# Patient Record
Sex: FEMALE | Race: WHITE | NOT HISPANIC OR LATINO | Employment: PART TIME | URBAN - METROPOLITAN AREA
[De-identification: names, ages, dates, MRNs, and addresses within clinical notes are randomized per-mention and may not be internally consistent; named-entity substitution may affect disease eponyms.]

---

## 2018-06-13 ENCOUNTER — APPOINTMENT (OUTPATIENT)
Dept: RADIOLOGY | Facility: MEDICAL CENTER | Age: 53
DRG: 987 | End: 2018-06-13
Payer: COMMERCIAL

## 2018-06-13 ENCOUNTER — HOSPITAL ENCOUNTER (INPATIENT)
Facility: MEDICAL CENTER | Age: 53
LOS: 8 days | DRG: 987 | End: 2018-06-21
Attending: EMERGENCY MEDICINE | Admitting: HOSPITALIST
Payer: COMMERCIAL

## 2018-06-13 ENCOUNTER — APPOINTMENT (OUTPATIENT)
Dept: RADIOLOGY | Facility: MEDICAL CENTER | Age: 53
DRG: 987 | End: 2018-06-13
Attending: EMERGENCY MEDICINE
Payer: COMMERCIAL

## 2018-06-13 DIAGNOSIS — R56.9 NEW ONSET SEIZURE (HCC): ICD-10-CM

## 2018-06-13 DIAGNOSIS — R40.4 ALTERED LEVEL OF CONSCIOUSNESS: ICD-10-CM

## 2018-06-13 DIAGNOSIS — R13.13 PHARYNGEAL DYSPHAGIA: ICD-10-CM

## 2018-06-13 PROBLEM — R00.0 TACHYCARDIA: Status: ACTIVE | Noted: 2018-06-13

## 2018-06-13 PROBLEM — G93.40 ENCEPHALOPATHY ACUTE: Status: ACTIVE | Noted: 2018-06-13

## 2018-06-13 LAB
ALBUMIN SERPL BCP-MCNC: 4.2 G/DL (ref 3.2–4.9)
ALBUMIN/GLOB SERPL: 1.1 G/DL
ALP SERPL-CCNC: 42 U/L (ref 30–99)
ALT SERPL-CCNC: 27 U/L (ref 2–50)
AMPHET UR QL SCN: NEGATIVE
ANION GAP SERPL CALC-SCNC: 10 MMOL/L (ref 0–11.9)
APPEARANCE UR: CLEAR
APTT PPP: 23.4 SEC (ref 24.7–36)
AST SERPL-CCNC: 22 U/L (ref 12–45)
BARBITURATES UR QL SCN: NEGATIVE
BASOPHILS # BLD AUTO: 0.6 % (ref 0–1.8)
BASOPHILS # BLD: 0.06 K/UL (ref 0–0.12)
BENZODIAZ UR QL SCN: NEGATIVE
BILIRUB SERPL-MCNC: 0.6 MG/DL (ref 0.1–1.5)
BILIRUB UR QL STRIP.AUTO: NEGATIVE
BUN SERPL-MCNC: 12 MG/DL (ref 8–22)
BZE UR QL SCN: NEGATIVE
CALCIUM SERPL-MCNC: 9.4 MG/DL (ref 8.5–10.5)
CANNABINOIDS UR QL SCN: NEGATIVE
CHLORIDE SERPL-SCNC: 106 MMOL/L (ref 96–112)
CO2 SERPL-SCNC: 21 MMOL/L (ref 20–33)
COLOR UR: YELLOW
CREAT SERPL-MCNC: 0.82 MG/DL (ref 0.5–1.4)
EOSINOPHIL # BLD AUTO: 0.11 K/UL (ref 0–0.51)
EOSINOPHIL NFR BLD: 1.1 % (ref 0–6.9)
ERYTHROCYTE [DISTWIDTH] IN BLOOD BY AUTOMATED COUNT: 50 FL (ref 35.9–50)
EST. AVERAGE GLUCOSE BLD GHB EST-MCNC: 100 MG/DL
ETHANOL BLD-MCNC: 0 G/DL
GLOBULIN SER CALC-MCNC: 3.9 G/DL (ref 1.9–3.5)
GLUCOSE SERPL-MCNC: 90 MG/DL (ref 65–99)
GLUCOSE UR STRIP.AUTO-MCNC: NEGATIVE MG/DL
HBA1C MFR BLD: 5.1 % (ref 0–5.6)
HCT VFR BLD AUTO: 38.2 % (ref 37–47)
HGB BLD-MCNC: 13.4 G/DL (ref 12–16)
IMM GRANULOCYTES # BLD AUTO: 0.04 K/UL (ref 0–0.11)
IMM GRANULOCYTES NFR BLD AUTO: 0.4 % (ref 0–0.9)
INR PPP: 1.05 (ref 0.87–1.13)
KETONES UR STRIP.AUTO-MCNC: 40 MG/DL
LEUKOCYTE ESTERASE UR QL STRIP.AUTO: NEGATIVE
LYMPHOCYTES # BLD AUTO: 2.78 K/UL (ref 1–4.8)
LYMPHOCYTES NFR BLD: 27.3 % (ref 22–41)
MAGNESIUM SERPL-MCNC: 1.8 MG/DL (ref 1.5–2.5)
MCH RBC QN AUTO: 32.3 PG (ref 27–33)
MCHC RBC AUTO-ENTMCNC: 35.1 G/DL (ref 33.6–35)
MCV RBC AUTO: 92 FL (ref 81.4–97.8)
METHADONE UR QL SCN: NEGATIVE
MICRO URNS: ABNORMAL
MONOCYTES # BLD AUTO: 0.58 K/UL (ref 0–0.85)
MONOCYTES NFR BLD AUTO: 5.7 % (ref 0–13.4)
NEUTROPHILS # BLD AUTO: 6.61 K/UL (ref 2–7.15)
NEUTROPHILS NFR BLD: 64.9 % (ref 44–72)
NITRITE UR QL STRIP.AUTO: NEGATIVE
NRBC # BLD AUTO: 0 K/UL
NRBC BLD-RTO: 0 /100 WBC
OPIATES UR QL SCN: NEGATIVE
OXYCODONE UR QL SCN: NEGATIVE
PCP UR QL SCN: NEGATIVE
PH UR STRIP.AUTO: 5 [PH]
PLATELET # BLD AUTO: 245 K/UL (ref 164–446)
PMV BLD AUTO: 8.4 FL (ref 9–12.9)
POTASSIUM SERPL-SCNC: 3.5 MMOL/L (ref 3.6–5.5)
PROPOXYPH UR QL SCN: NEGATIVE
PROT SERPL-MCNC: 8.1 G/DL (ref 6–8.2)
PROT UR QL STRIP: NEGATIVE MG/DL
PROTHROMBIN TIME: 13.4 SEC (ref 12–14.6)
RBC # BLD AUTO: 4.15 M/UL (ref 4.2–5.4)
RBC UR QL AUTO: NEGATIVE
SODIUM SERPL-SCNC: 137 MMOL/L (ref 135–145)
SP GR UR STRIP.AUTO: 1.02
TROPONIN I SERPL-MCNC: <0.01 NG/ML (ref 0–0.04)
TSH SERPL DL<=0.005 MIU/L-ACNC: 2.61 UIU/ML (ref 0.38–5.33)
UROBILINOGEN UR STRIP.AUTO-MCNC: 0.2 MG/DL
WBC # BLD AUTO: 10.2 K/UL (ref 4.8–10.8)

## 2018-06-13 PROCEDURE — 83735 ASSAY OF MAGNESIUM: CPT

## 2018-06-13 PROCEDURE — 80307 DRUG TEST PRSMV CHEM ANLYZR: CPT

## 2018-06-13 PROCEDURE — 96375 TX/PRO/DX INJ NEW DRUG ADDON: CPT

## 2018-06-13 PROCEDURE — 80053 COMPREHEN METABOLIC PANEL: CPT

## 2018-06-13 PROCEDURE — 700111 HCHG RX REV CODE 636 W/ 250 OVERRIDE (IP): Performed by: PSYCHIATRY & NEUROLOGY

## 2018-06-13 PROCEDURE — 700105 HCHG RX REV CODE 258: Performed by: PSYCHIATRY & NEUROLOGY

## 2018-06-13 PROCEDURE — 99291 CRITICAL CARE FIRST HOUR: CPT | Performed by: INTERNAL MEDICINE

## 2018-06-13 PROCEDURE — 96372 THER/PROPH/DIAG INJ SC/IM: CPT

## 2018-06-13 PROCEDURE — 83036 HEMOGLOBIN GLYCOSYLATED A1C: CPT

## 2018-06-13 PROCEDURE — 71045 X-RAY EXAM CHEST 1 VIEW: CPT

## 2018-06-13 PROCEDURE — 84484 ASSAY OF TROPONIN QUANT: CPT

## 2018-06-13 PROCEDURE — 99223 1ST HOSP IP/OBS HIGH 75: CPT | Performed by: HOSPITALIST

## 2018-06-13 PROCEDURE — 700111 HCHG RX REV CODE 636 W/ 250 OVERRIDE (IP)

## 2018-06-13 PROCEDURE — 304561 HCHG STAT O2

## 2018-06-13 PROCEDURE — 700101 HCHG RX REV CODE 250: Performed by: EMERGENCY MEDICINE

## 2018-06-13 PROCEDURE — 70450 CT HEAD/BRAIN W/O DYE: CPT

## 2018-06-13 PROCEDURE — 770022 HCHG ROOM/CARE - ICU (200)

## 2018-06-13 PROCEDURE — 81003 URINALYSIS AUTO W/O SCOPE: CPT

## 2018-06-13 PROCEDURE — 700105 HCHG RX REV CODE 258: Performed by: EMERGENCY MEDICINE

## 2018-06-13 PROCEDURE — 85025 COMPLETE CBC W/AUTO DIFF WBC: CPT

## 2018-06-13 PROCEDURE — 85730 THROMBOPLASTIN TIME PARTIAL: CPT

## 2018-06-13 PROCEDURE — 84443 ASSAY THYROID STIM HORMONE: CPT

## 2018-06-13 PROCEDURE — 96376 TX/PRO/DX INJ SAME DRUG ADON: CPT

## 2018-06-13 PROCEDURE — 700111 HCHG RX REV CODE 636 W/ 250 OVERRIDE (IP): Performed by: HOSPITALIST

## 2018-06-13 PROCEDURE — 96374 THER/PROPH/DIAG INJ IV PUSH: CPT

## 2018-06-13 PROCEDURE — 700111 HCHG RX REV CODE 636 W/ 250 OVERRIDE (IP): Performed by: EMERGENCY MEDICINE

## 2018-06-13 PROCEDURE — 85610 PROTHROMBIN TIME: CPT

## 2018-06-13 PROCEDURE — 99291 CRITICAL CARE FIRST HOUR: CPT

## 2018-06-13 PROCEDURE — 95951 HCHG EEG-VIDEO-24HR: CPT | Mod: 52

## 2018-06-13 PROCEDURE — 4A10X4Z MONITORING OF CENTRAL NERVOUS ELECTRICAL ACTIVITY, EXTERNAL APPROACH: ICD-10-PCS | Performed by: PSYCHIATRY & NEUROLOGY

## 2018-06-13 PROCEDURE — 95956 EEG-24 HR: CPT | Mod: 52

## 2018-06-13 RX ORDER — KETAMINE HYDROCHLORIDE 50 MG/ML
100 INJECTION, SOLUTION INTRAMUSCULAR; INTRAVENOUS ONCE
Status: COMPLETED | OUTPATIENT
Start: 2018-06-13 | End: 2018-06-13

## 2018-06-13 RX ORDER — BISACODYL 10 MG
10 SUPPOSITORY, RECTAL RECTAL
Status: DISCONTINUED | OUTPATIENT
Start: 2018-06-13 | End: 2018-06-14

## 2018-06-13 RX ORDER — LORAZEPAM 2 MG/ML
0.5 INJECTION INTRAMUSCULAR ONCE
Status: COMPLETED | OUTPATIENT
Start: 2018-06-13 | End: 2018-06-13

## 2018-06-13 RX ORDER — ONDANSETRON 4 MG/1
4 TABLET, ORALLY DISINTEGRATING ORAL EVERY 4 HOURS PRN
Status: DISCONTINUED | OUTPATIENT
Start: 2018-06-13 | End: 2018-06-21 | Stop reason: HOSPADM

## 2018-06-13 RX ORDER — ACETAMINOPHEN 325 MG/1
650 TABLET ORAL EVERY 6 HOURS PRN
Status: DISCONTINUED | OUTPATIENT
Start: 2018-06-13 | End: 2018-06-19

## 2018-06-13 RX ORDER — ZIPRASIDONE MESYLATE 20 MG/ML
20 INJECTION, POWDER, LYOPHILIZED, FOR SOLUTION INTRAMUSCULAR ONCE
Status: COMPLETED | OUTPATIENT
Start: 2018-06-13 | End: 2018-06-13

## 2018-06-13 RX ORDER — LORAZEPAM 2 MG/ML
INJECTION INTRAMUSCULAR
Status: COMPLETED
Start: 2018-06-13 | End: 2018-06-13

## 2018-06-13 RX ORDER — LORAZEPAM 2 MG/ML
2 INJECTION INTRAMUSCULAR ONCE
Status: COMPLETED | OUTPATIENT
Start: 2018-06-13 | End: 2018-06-13

## 2018-06-13 RX ORDER — SODIUM CHLORIDE 9 MG/ML
INJECTION, SOLUTION INTRAVENOUS ONCE
Status: COMPLETED | OUTPATIENT
Start: 2018-06-13 | End: 2018-06-13

## 2018-06-13 RX ORDER — PROMETHAZINE HYDROCHLORIDE 25 MG/1
12.5-25 SUPPOSITORY RECTAL EVERY 4 HOURS PRN
Status: DISCONTINUED | OUTPATIENT
Start: 2018-06-13 | End: 2018-06-21 | Stop reason: HOSPADM

## 2018-06-13 RX ORDER — POLYETHYLENE GLYCOL 3350 17 G/17G
1 POWDER, FOR SOLUTION ORAL
Status: DISCONTINUED | OUTPATIENT
Start: 2018-06-13 | End: 2018-06-14

## 2018-06-13 RX ORDER — ONDANSETRON 2 MG/ML
4 INJECTION INTRAMUSCULAR; INTRAVENOUS EVERY 4 HOURS PRN
Status: DISCONTINUED | OUTPATIENT
Start: 2018-06-13 | End: 2018-06-21 | Stop reason: HOSPADM

## 2018-06-13 RX ORDER — HALOPERIDOL 5 MG/ML
2-5 INJECTION INTRAMUSCULAR EVERY 4 HOURS PRN
Status: DISCONTINUED | OUTPATIENT
Start: 2018-06-13 | End: 2018-06-14 | Stop reason: SINTOL

## 2018-06-13 RX ORDER — PROMETHAZINE HYDROCHLORIDE 25 MG/1
12.5-25 TABLET ORAL EVERY 4 HOURS PRN
Status: DISCONTINUED | OUTPATIENT
Start: 2018-06-13 | End: 2018-06-19

## 2018-06-13 RX ORDER — SODIUM CHLORIDE AND POTASSIUM CHLORIDE 150; 900 MG/100ML; MG/100ML
INJECTION, SOLUTION INTRAVENOUS CONTINUOUS
Status: DISCONTINUED | OUTPATIENT
Start: 2018-06-13 | End: 2018-06-17

## 2018-06-13 RX ORDER — AMOXICILLIN 250 MG
2 CAPSULE ORAL 2 TIMES DAILY
Status: DISCONTINUED | OUTPATIENT
Start: 2018-06-13 | End: 2018-06-14

## 2018-06-13 RX ORDER — LORAZEPAM 2 MG/ML
4 INJECTION INTRAMUSCULAR
Status: DISCONTINUED | OUTPATIENT
Start: 2018-06-13 | End: 2018-06-14

## 2018-06-13 RX ADMIN — LORAZEPAM 2 MG: 2 INJECTION INTRAMUSCULAR at 15:15

## 2018-06-13 RX ADMIN — KETAMINE HYDROCHLORIDE 100 MG: 50 INJECTION, SOLUTION, CONCENTRATE INTRAMUSCULAR; INTRAVENOUS at 16:30

## 2018-06-13 RX ADMIN — SODIUM CHLORIDE 2000 MG: 9 INJECTION, SOLUTION INTRAVENOUS at 15:34

## 2018-06-13 RX ADMIN — LORAZEPAM 1 MG: 2 INJECTION INTRAMUSCULAR; INTRAVENOUS at 16:15

## 2018-06-13 RX ADMIN — LORAZEPAM 2 MG: 2 INJECTION INTRAMUSCULAR; INTRAVENOUS at 17:12

## 2018-06-13 RX ADMIN — ZIPRASIDONE MESYLATE 20 MG: 20 INJECTION, POWDER, LYOPHILIZED, FOR SOLUTION INTRAMUSCULAR at 18:31

## 2018-06-13 RX ADMIN — SODIUM CHLORIDE 750 MG: 9 INJECTION, SOLUTION INTRAVENOUS at 18:58

## 2018-06-13 RX ADMIN — ONDANSETRON 4 MG: 2 INJECTION INTRAMUSCULAR; INTRAVENOUS at 20:46

## 2018-06-13 RX ADMIN — LORAZEPAM 2 MG: 2 INJECTION INTRAMUSCULAR; INTRAVENOUS at 22:22

## 2018-06-13 RX ADMIN — LORAZEPAM 2 MG: 2 INJECTION INTRAMUSCULAR; INTRAVENOUS at 15:15

## 2018-06-13 RX ADMIN — SODIUM CHLORIDE: 9 INJECTION, SOLUTION INTRAVENOUS at 17:00

## 2018-06-13 ASSESSMENT — COPD QUESTIONNAIRES
DURING THE PAST 4 WEEKS HOW MUCH DID YOU FEEL SHORT OF BREATH: NONE/LITTLE OF THE TIME
COPD SCREENING SCORE: 1
DO YOU EVER COUGH UP ANY MUCUS OR PHLEGM?: NO/ONLY WITH OCCASIONAL COLDS OR INFECTIONS
HAVE YOU SMOKED AT LEAST 100 CIGARETTES IN YOUR ENTIRE LIFE: NO/DON'T KNOW

## 2018-06-13 ASSESSMENT — LIFESTYLE VARIABLES: EVER_SMOKED: NEVER

## 2018-06-13 NOTE — ED NOTES
Pt suctioned and high flow o2 admin at charge desk. Pt moved to trauma room after seizure for further neuro MD consult.  Ativan dose ordered again based on pt presentation.    Pt transferred to red 6.

## 2018-06-13 NOTE — ED PROVIDER NOTES
"ED Provider Note    CHIEF COMPLAINT  Chief Complaint   Patient presents with   • ALOC       HPI  Malathi York is a 52 y.o. female who presents via transfer for evaluation of a possible stroke, the patient had a seizure while being evaluated immediately upon arrival at the charge desk here in the emergency department.  The history is that the patient woke up this morning confused and had some difficulty with ambulation, she was seen at the outside facility and it sounds as though she was diagnosed with a TIA and transferred here for further workup.  By report she was simply confused without any evidence of focal deficit, was last seen 8 PM last night and then woke up with her current symptoms.  History of diabetes, given the active seizure upon presentation no further history is available    REVIEW OF SYSTEMS  Unobtainable secondary to clinical condition    PAST MEDICAL HISTORY  History reviewed. No pertinent past medical history.    FAMILY HISTORY  History reviewed. No pertinent family history.    SOCIAL HISTORY  Social History   Substance Use Topics   • Smoking status: Not on file   • Smokeless tobacco: Not on file   • Alcohol use Not on file       SURGICAL HISTORY  History reviewed. No pertinent surgical history.    CURRENT MEDICATIONS  I personally reviewed the medication list in the charting documentation.     ALLERGIES  Allergies   Allergen Reactions   • Penicillins Unspecified     \"childhood\"       MEDICAL RECORD  I have reviewed patient's medical record and pertinent results are listed above.      PHYSICAL EXAM  VITAL SIGNS: BP (!) 180/85   Pulse 95   Temp 37.3 °C (99.1 °F)   Resp (!) 28   Ht 1.75 m (5' 8.9\")   Wt 83.9 kg (185 lb)   SpO2 100%   BMI 27.40 kg/m²    Constitutional: Initially alert and overall well appearing, progress into a tonic-clonic seizure  HENT: Mucus membranes moist.    Eyes: No scleral icterus. Normal conjunctiva.  Pupils are equal and reactive.  Visual fields full.  Neck: " Supple, comfortable, nonpainful range of motion.   Cardiovascular: Regular heart rate and rhythm.  Tachycardic during seizure.  Thorax & Lungs: Chest is nontender.  Lungs are clear to auscultation with good air movement bilaterally.  No wheeze, rhonchi, nor rales.   Abdomen: Soft, with no tenderness, rebound nor guarding.  No mass or pulsatile mass appreciated.  Skin: Warm, dry. No rash appreciated  Extremities/Musculoskeletal: No sign of trauma. No asymmetric calf tenderness, erythema or edema. Normal range of motion   Neurologic: Alert, confused, possible right sided lower extremity motor deficit versus lack of cooperation, otherwise she seem to have nonfocal movements but a complete neurologic examination was not fully completed prior to the onset of the seizure    DIAGNOSTIC STUDIES / PROCEDURES    LABS  Results for orders placed or performed during the hospital encounter of 06/13/18   URINALYSIS   Result Value Ref Range    Color Yellow     Character Clear     Specific Gravity 1.022 <1.035    Ph 5.0 5.0 - 8.0    Glucose Negative Negative mg/dL    Ketones 40 (A) Negative mg/dL    Protein Negative Negative mg/dL    Bilirubin Negative Negative    Urobilinogen, Urine 0.2 Negative    Nitrite Negative Negative    Leukocyte Esterase Negative Negative    Occult Blood Negative Negative    Micro Urine Req see below    PROTHROMBIN TIME   Result Value Ref Range    PT 13.4 12.0 - 14.6 sec    INR 1.05 0.87 - 1.13   APTT   Result Value Ref Range    APTT 23.4 (L) 24.7 - 36.0 sec   CBC WITH DIFFERENTIAL   Result Value Ref Range    WBC 10.2 4.8 - 10.8 K/uL    RBC 4.15 (L) 4.20 - 5.40 M/uL    Hemoglobin 13.4 12.0 - 16.0 g/dL    Hematocrit 38.2 37.0 - 47.0 %    MCV 92.0 81.4 - 97.8 fL    MCH 32.3 27.0 - 33.0 pg    MCHC 35.1 (H) 33.6 - 35.0 g/dL    RDW 50.0 35.9 - 50.0 fL    Platelet Count 245 164 - 446 K/uL    MPV 8.4 (L) 9.0 - 12.9 fL    Neutrophils-Polys 64.90 44.00 - 72.00 %    Lymphocytes 27.30 22.00 - 41.00 %    Monocytes 5.70  0.00 - 13.40 %    Eosinophils 1.10 0.00 - 6.90 %    Basophils 0.60 0.00 - 1.80 %    Immature Granulocytes 0.40 0.00 - 0.90 %    Nucleated RBC 0.00 /100 WBC    Neutrophils (Absolute) 6.61 2.00 - 7.15 K/uL    Lymphs (Absolute) 2.78 1.00 - 4.80 K/uL    Monos (Absolute) 0.58 0.00 - 0.85 K/uL    Eos (Absolute) 0.11 0.00 - 0.51 K/uL    Baso (Absolute) 0.06 0.00 - 0.12 K/uL    Immature Granulocytes (abs) 0.04 0.00 - 0.11 K/uL    NRBC (Absolute) 0.00 K/uL   COMP METABOLIC PANEL   Result Value Ref Range    Sodium 137 135 - 145 mmol/L    Potassium 3.5 (L) 3.6 - 5.5 mmol/L    Chloride 106 96 - 112 mmol/L    Co2 21 20 - 33 mmol/L    Anion Gap 10.0 0.0 - 11.9    Glucose 90 65 - 99 mg/dL    Bun 12 8 - 22 mg/dL    Creatinine 0.82 0.50 - 1.40 mg/dL    Calcium 9.4 8.5 - 10.5 mg/dL    AST(SGOT) 22 12 - 45 U/L    ALT(SGPT) 27 2 - 50 U/L    Alkaline Phosphatase 42 30 - 99 U/L    Total Bilirubin 0.6 0.1 - 1.5 mg/dL    Albumin 4.2 3.2 - 4.9 g/dL    Total Protein 8.1 6.0 - 8.2 g/dL    Globulin 3.9 (H) 1.9 - 3.5 g/dL    A-G Ratio 1.1 g/dL   TROPONIN   Result Value Ref Range    Troponin I <0.01 0.00 - 0.04 ng/mL   URINE DRUG SCREEN   Result Value Ref Range    Amphetamines Urine Negative Negative    Barbiturates Negative Negative    Benzodiazepines Negative Negative    Cocaine Metabolite Negative Negative    Methadone Negative Negative    Opiates Negative Negative    Oxycodone Negative Negative    Phencyclidine -Pcp Negative Negative    Propoxyphene Negative Negative    Cannabinoid Metab Negative Negative   DIAGNOSTIC ALCOHOL   Result Value Ref Range    Diagnostic Alcohol 0.00 0.00 g/dL   ESTIMATED GFR   Result Value Ref Range    GFR If African American >60 >60 mL/min/1.73 m 2    GFR If Non African American >60 >60 mL/min/1.73 m 2   TSH (Thyroid Stimulating Hormone)   Result Value Ref Range    TSH 2.610 0.380 - 5.330 uIU/mL   Magnesium   Result Value Ref Range    Magnesium 1.8 1.5 - 2.5 mg/dL        RADIOLOGY  DX-CHEST-LIMITED (1 VIEW)    Final Result         No acute cardiac or pulmonary abnormality is identified.      CT-HEAD W/O   Final Result      No evidence of acute intracranial process.      MR-BRAIN-W/O    (Results Pending)         COURSE & MEDICAL DECISION MAKING  I have reviewed any medical record information, laboratory studies and radiographic results as noted above.  Differential diagnoses includes: CVA, new onset seizure, intracranial mass, scleral hemorrhage, electrolyte abnormalities, dehydration, anemia    Encounter Summary: This is a 52 y.o. female transferred here for evaluation of a possible TIA with isolated confusion upon waking up this morning, she arrives well outside of any window for treatment with alteplase therefore she is not alteplase candidate.  As she was being evaluated by the neurologist and myself that the charges that she began to experience a generalized tonic-clonic seizure.  A full neurologic examination was not possible in the short time before onset of the seizure.  She apparently does not have a seizure history.  Will obtain a stat head CT and blood work and ultimately she will be admitted to the hospital in guarded condition.  She did require treatment with Ativan, 2 separate doses of 2 mg ------ the patient was increasingly agitated, required some ketamine for the CT scan and ultimately received a total of 7 mg of Ativan with increasing agitation.  I again discussed the case with the neurologist who recommended treatment with Geodon, this was done with some improvement in her agitation.  Ultimately she is admitted to the hospital in guarded condition.  Of note, the patient did have some evidence of dehydration on exam and this is why she received IV fluids, afterwards she seemed improved.    CRITICAL CARE  The very real possibilty of a deterioration of this patient's condition required the highest level of my preparedness for sudden, emergent intervention.  I provided critical care services, which  included medication orders, frequent reevaluations of the patient's condition and response to treatment, ordering and reviewing test results, and discussing the case with various consultants.  The critical care time associated with the care of the patient was 42 minutes. Review chart for interventions. This time is exclusive of any other billable procedures.         DISPOSITION: Admit in guarded condition      FINAL IMPRESSION  1. New onset seizure (HCC)    2. Altered level of consciousness           This dictation was created using voice recognition software. The accuracy of the dictation is limited to the abilities of the software. I expect there may be some errors of grammar and possibly content. The nursing notes were reviewed and certain aspects of this information were incorporated into this note.    Electronically signed by: Dao Medley, 6/13/2018 3:28 PM

## 2018-06-13 NOTE — ED NOTES
To CT, patient given ativan 1 mg ivp, remained agitated, unable to remain still enough, Dr Haile notified, Dr Haile to CT , Ketamine 100 mg given to obtain to CT, returned to room, remains agitated, continually moving, nonpurposeful, arms and legs flailing, family reports cold cough x 2 weeks recently

## 2018-06-14 ENCOUNTER — APPOINTMENT (OUTPATIENT)
Dept: RADIOLOGY | Facility: MEDICAL CENTER | Age: 53
DRG: 987 | End: 2018-06-14
Attending: PSYCHIATRY & NEUROLOGY
Payer: COMMERCIAL

## 2018-06-14 ENCOUNTER — APPOINTMENT (OUTPATIENT)
Dept: RADIOLOGY | Facility: MEDICAL CENTER | Age: 53
DRG: 987 | End: 2018-06-14
Attending: INTERNAL MEDICINE
Payer: COMMERCIAL

## 2018-06-14 PROBLEM — J18.9 PNEUMONIA DUE TO INFECTIOUS ORGANISM: Status: ACTIVE | Noted: 2018-06-14

## 2018-06-14 PROBLEM — J96.01 ACUTE RESPIRATORY FAILURE WITH HYPOXEMIA (HCC): Status: ACTIVE | Noted: 2018-06-14

## 2018-06-14 PROBLEM — L73.2 HYDRADENITIS: Status: ACTIVE | Noted: 2018-06-14

## 2018-06-14 PROBLEM — J98.11 ATELECTASIS: Status: ACTIVE | Noted: 2018-06-14

## 2018-06-14 PROBLEM — D72.829 LEUKOCYTOSIS: Status: ACTIVE | Noted: 2018-06-14

## 2018-06-14 LAB
ACTION RANGE TRIGGERED IACRT: NO
ANION GAP SERPL CALC-SCNC: 9 MMOL/L (ref 0–11.9)
BASE EXCESS BLDA CALC-SCNC: -4 MMOL/L (ref -4–3)
BODY TEMPERATURE: ABNORMAL DEGREES
BUN SERPL-MCNC: 8 MG/DL (ref 8–22)
CALCIUM SERPL-MCNC: 8.5 MG/DL (ref 8.5–10.5)
CHLORIDE SERPL-SCNC: 105 MMOL/L (ref 96–112)
CO2 BLDA-SCNC: 22 MMOL/L (ref 20–33)
CO2 SERPL-SCNC: 21 MMOL/L (ref 20–33)
CREAT SERPL-MCNC: 0.75 MG/DL (ref 0.5–1.4)
CRP SERPL HS-MCNC: 0.91 MG/DL (ref 0–0.75)
DEPRECATED D DIMER PPP IA-ACNC: <200 NG/ML(D-DU)
ERYTHROCYTE [DISTWIDTH] IN BLOOD BY AUTOMATED COUNT: 48.5 FL (ref 35.9–50)
ERYTHROCYTE [SEDIMENTATION RATE] IN BLOOD BY WESTERGREN METHOD: 21 MM/HOUR (ref 0–30)
FOLATE SERPL-MCNC: >24 NG/ML
GLUCOSE BLD-MCNC: 106 MG/DL (ref 65–99)
GLUCOSE SERPL-MCNC: 118 MG/DL (ref 65–99)
HCO3 BLDA-SCNC: 20.5 MMOL/L (ref 17–25)
HCT VFR BLD AUTO: 36.5 % (ref 37–47)
HGB BLD-MCNC: 12.8 G/DL (ref 12–16)
INST. QUALIFIED PATIENT IIQPT: YES
MCH RBC QN AUTO: 32 PG (ref 27–33)
MCHC RBC AUTO-ENTMCNC: 35.1 G/DL (ref 33.6–35)
MCV RBC AUTO: 91.3 FL (ref 81.4–97.8)
O2/TOTAL GAS SETTING VFR VENT: 100 %
PCO2 BLDA: 34.4 MMHG (ref 26–37)
PCO2 TEMP ADJ BLDA: 33.9 MMHG (ref 26–37)
PH BLDA: 7.38 [PH] (ref 7.4–7.5)
PH TEMP ADJ BLDA: 7.39 [PH] (ref 7.4–7.5)
PLATELET # BLD AUTO: 237 K/UL (ref 164–446)
PMV BLD AUTO: 8.4 FL (ref 9–12.9)
PO2 BLDA: 199 MMHG (ref 64–87)
PO2 TEMP ADJ BLDA: 197 MMHG (ref 64–87)
POTASSIUM SERPL-SCNC: 4 MMOL/L (ref 3.6–5.5)
PROCALCITONIN SERPL-MCNC: 0.07 NG/ML
RBC # BLD AUTO: 4 M/UL (ref 4.2–5.4)
SAO2 % BLDA: 100 % (ref 93–99)
SODIUM SERPL-SCNC: 135 MMOL/L (ref 135–145)
SPECIMEN DRAWN FROM PATIENT: ABNORMAL
VIT B12 SERPL-MCNC: 1195 PG/ML (ref 211–911)
WBC # BLD AUTO: 14.5 K/UL (ref 4.8–10.8)

## 2018-06-14 PROCEDURE — 87070 CULTURE OTHR SPECIMN AEROBIC: CPT

## 2018-06-14 PROCEDURE — 87184 SC STD DISK METHOD PER PLATE: CPT

## 2018-06-14 PROCEDURE — 700105 HCHG RX REV CODE 258: Performed by: INTERNAL MEDICINE

## 2018-06-14 PROCEDURE — 700105 HCHG RX REV CODE 258: Performed by: HOSPITALIST

## 2018-06-14 PROCEDURE — 87205 SMEAR GRAM STAIN: CPT

## 2018-06-14 PROCEDURE — 700117 HCHG RX CONTRAST REV CODE 255: Performed by: PSYCHIATRY & NEUROLOGY

## 2018-06-14 PROCEDURE — 302214 INTUBATION BOX: Performed by: INTERNAL MEDICINE

## 2018-06-14 PROCEDURE — 302978 HCHG BRONCHOSCOPY-DIAGNOSTIC

## 2018-06-14 PROCEDURE — C1751 CATH, INF, PER/CENT/MIDLINE: HCPCS

## 2018-06-14 PROCEDURE — 82607 VITAMIN B-12: CPT

## 2018-06-14 PROCEDURE — 31645 BRNCHSC W/THER ASPIR 1ST: CPT | Mod: 59 | Performed by: INTERNAL MEDICINE

## 2018-06-14 PROCEDURE — 82803 BLOOD GASES ANY COMBINATION: CPT

## 2018-06-14 PROCEDURE — B548ZZA ULTRASONOGRAPHY OF SUPERIOR VENA CAVA, GUIDANCE: ICD-10-PCS | Performed by: INTERNAL MEDICINE

## 2018-06-14 PROCEDURE — 87116 MYCOBACTERIA CULTURE: CPT

## 2018-06-14 PROCEDURE — 82746 ASSAY OF FOLIC ACID SERUM: CPT

## 2018-06-14 PROCEDURE — 99292 CRITICAL CARE ADDL 30 MIN: CPT | Mod: 25 | Performed by: INTERNAL MEDICINE

## 2018-06-14 PROCEDURE — 85379 FIBRIN DEGRADATION QUANT: CPT

## 2018-06-14 PROCEDURE — 700111 HCHG RX REV CODE 636 W/ 250 OVERRIDE (IP): Performed by: HOSPITALIST

## 2018-06-14 PROCEDURE — 70553 MRI BRAIN STEM W/O & W/DYE: CPT

## 2018-06-14 PROCEDURE — 99233 SBSQ HOSP IP/OBS HIGH 50: CPT | Performed by: HOSPITALIST

## 2018-06-14 PROCEDURE — 36556 INSERT NON-TUNNEL CV CATH: CPT | Performed by: INTERNAL MEDICINE

## 2018-06-14 PROCEDURE — 80048 BASIC METABOLIC PNL TOTAL CA: CPT

## 2018-06-14 PROCEDURE — 0BH18EZ INSERTION OF ENDOTRACHEAL AIRWAY INTO TRACHEA, VIA NATURAL OR ARTIFICIAL OPENING ENDOSCOPIC: ICD-10-PCS | Performed by: INTERNAL MEDICINE

## 2018-06-14 PROCEDURE — 5A1945Z RESPIRATORY VENTILATION, 24-96 CONSECUTIVE HOURS: ICD-10-PCS | Performed by: INTERNAL MEDICINE

## 2018-06-14 PROCEDURE — 87206 SMEAR FLUORESCENT/ACID STAI: CPT

## 2018-06-14 PROCEDURE — 31624 DX BRONCHOSCOPE/LAVAGE: CPT | Performed by: INTERNAL MEDICINE

## 2018-06-14 PROCEDURE — 700101 HCHG RX REV CODE 250: Performed by: HOSPITALIST

## 2018-06-14 PROCEDURE — 700111 HCHG RX REV CODE 636 W/ 250 OVERRIDE (IP): Performed by: INTERNAL MEDICINE

## 2018-06-14 PROCEDURE — 70496 CT ANGIOGRAPHY HEAD: CPT

## 2018-06-14 PROCEDURE — 87040 BLOOD CULTURE FOR BACTERIA: CPT | Mod: 91

## 2018-06-14 PROCEDURE — 700111 HCHG RX REV CODE 636 W/ 250 OVERRIDE (IP): Performed by: PSYCHIATRY & NEUROLOGY

## 2018-06-14 PROCEDURE — 71045 X-RAY EXAM CHEST 1 VIEW: CPT

## 2018-06-14 PROCEDURE — 92950 HEART/LUNG RESUSCITATION CPR: CPT

## 2018-06-14 PROCEDURE — 700102 HCHG RX REV CODE 250 W/ 637 OVERRIDE(OP): Performed by: INTERNAL MEDICINE

## 2018-06-14 PROCEDURE — 0B9F8ZX DRAINAGE OF RIGHT LOWER LUNG LOBE, VIA NATURAL OR ARTIFICIAL OPENING ENDOSCOPIC, DIAGNOSTIC: ICD-10-PCS | Performed by: INTERNAL MEDICINE

## 2018-06-14 PROCEDURE — 02HV33Z INSERTION OF INFUSION DEVICE INTO SUPERIOR VENA CAVA, PERCUTANEOUS APPROACH: ICD-10-PCS | Performed by: INTERNAL MEDICINE

## 2018-06-14 PROCEDURE — 87186 SC STD MICRODIL/AGAR DIL: CPT

## 2018-06-14 PROCEDURE — 36600 WITHDRAWAL OF ARTERIAL BLOOD: CPT

## 2018-06-14 PROCEDURE — A9579 GAD-BASE MR CONTRAST NOS,1ML: HCPCS | Performed by: INTERNAL MEDICINE

## 2018-06-14 PROCEDURE — 84145 PROCALCITONIN (PCT): CPT

## 2018-06-14 PROCEDURE — 31500 INSERT EMERGENCY AIRWAY: CPT | Performed by: INTERNAL MEDICINE

## 2018-06-14 PROCEDURE — 700101 HCHG RX REV CODE 250: Performed by: INTERNAL MEDICINE

## 2018-06-14 PROCEDURE — 36556 INSERT NON-TUNNEL CV CATH: CPT

## 2018-06-14 PROCEDURE — 99233 SBSQ HOSP IP/OBS HIGH 50: CPT | Performed by: INTERNAL MEDICINE

## 2018-06-14 PROCEDURE — 85027 COMPLETE CBC AUTOMATED: CPT

## 2018-06-14 PROCEDURE — 770022 HCHG ROOM/CARE - ICU (200)

## 2018-06-14 PROCEDURE — 84425 ASSAY OF VITAMIN B-1: CPT

## 2018-06-14 PROCEDURE — A9270 NON-COVERED ITEM OR SERVICE: HCPCS | Performed by: INTERNAL MEDICINE

## 2018-06-14 PROCEDURE — 85652 RBC SED RATE AUTOMATED: CPT

## 2018-06-14 PROCEDURE — 700105 HCHG RX REV CODE 258: Performed by: PSYCHIATRY & NEUROLOGY

## 2018-06-14 PROCEDURE — 87102 FUNGUS ISOLATION CULTURE: CPT

## 2018-06-14 PROCEDURE — 94002 VENT MGMT INPAT INIT DAY: CPT

## 2018-06-14 PROCEDURE — 86140 C-REACTIVE PROTEIN: CPT

## 2018-06-14 PROCEDURE — 700117 HCHG RX CONTRAST REV CODE 255: Performed by: INTERNAL MEDICINE

## 2018-06-14 PROCEDURE — 88305 TISSUE EXAM BY PATHOLOGIST: CPT

## 2018-06-14 PROCEDURE — 99291 CRITICAL CARE FIRST HOUR: CPT | Mod: 25 | Performed by: INTERNAL MEDICINE

## 2018-06-14 PROCEDURE — 82962 GLUCOSE BLOOD TEST: CPT

## 2018-06-14 PROCEDURE — 88112 CYTOPATH CELL ENHANCE TECH: CPT

## 2018-06-14 PROCEDURE — 31500 INSERT EMERGENCY AIRWAY: CPT

## 2018-06-14 RX ORDER — BISACODYL 10 MG
10 SUPPOSITORY, RECTAL RECTAL
Status: DISCONTINUED | OUTPATIENT
Start: 2018-06-14 | End: 2018-06-19

## 2018-06-14 RX ORDER — MIDAZOLAM HYDROCHLORIDE 1 MG/ML
2 INJECTION INTRAMUSCULAR; INTRAVENOUS ONCE
Status: COMPLETED | OUTPATIENT
Start: 2018-06-14 | End: 2018-06-14

## 2018-06-14 RX ORDER — IPRATROPIUM BROMIDE AND ALBUTEROL SULFATE 2.5; .5 MG/3ML; MG/3ML
3 SOLUTION RESPIRATORY (INHALATION)
Status: DISCONTINUED | OUTPATIENT
Start: 2018-06-14 | End: 2018-06-21 | Stop reason: HOSPADM

## 2018-06-14 RX ORDER — LORAZEPAM 2 MG/ML
2-4 INJECTION INTRAMUSCULAR
Status: DISCONTINUED | OUTPATIENT
Start: 2018-06-14 | End: 2018-06-14

## 2018-06-14 RX ORDER — ROCURONIUM BROMIDE 10 MG/ML
50 INJECTION, SOLUTION INTRAVENOUS ONCE
Status: DISCONTINUED | OUTPATIENT
Start: 2018-06-14 | End: 2018-06-14

## 2018-06-14 RX ORDER — DEXAMETHASONE SODIUM PHOSPHATE 10 MG/ML
10 INJECTION, SOLUTION INTRAMUSCULAR; INTRAVENOUS ONCE
Status: COMPLETED | OUTPATIENT
Start: 2018-06-14 | End: 2018-06-14

## 2018-06-14 RX ORDER — POLYETHYLENE GLYCOL 3350 17 G/17G
1 POWDER, FOR SOLUTION ORAL
Status: DISCONTINUED | OUTPATIENT
Start: 2018-06-14 | End: 2018-06-19

## 2018-06-14 RX ORDER — LORAZEPAM 2 MG/ML
2-4 INJECTION INTRAMUSCULAR
Status: DISCONTINUED | OUTPATIENT
Start: 2018-06-14 | End: 2018-06-21 | Stop reason: HOSPADM

## 2018-06-14 RX ORDER — ROCURONIUM BROMIDE 10 MG/ML
70 INJECTION, SOLUTION INTRAVENOUS ONCE
Status: COMPLETED | OUTPATIENT
Start: 2018-06-14 | End: 2018-06-14

## 2018-06-14 RX ORDER — AMOXICILLIN 250 MG
2 CAPSULE ORAL 2 TIMES DAILY
Status: DISCONTINUED | OUTPATIENT
Start: 2018-06-14 | End: 2018-06-19

## 2018-06-14 RX ORDER — ACETAMINOPHEN 650 MG/1
650 SUPPOSITORY RECTAL EVERY 6 HOURS PRN
Status: DISCONTINUED | OUTPATIENT
Start: 2018-06-14 | End: 2018-06-21 | Stop reason: HOSPADM

## 2018-06-14 RX ORDER — DEXTROSE MONOHYDRATE 25 G/50ML
25 INJECTION, SOLUTION INTRAVENOUS
Status: DISCONTINUED | OUTPATIENT
Start: 2018-06-14 | End: 2018-06-16

## 2018-06-14 RX ORDER — SODIUM CHLORIDE, SODIUM LACTATE, POTASSIUM CHLORIDE, CALCIUM CHLORIDE 600; 310; 30; 20 MG/100ML; MG/100ML; MG/100ML; MG/100ML
1000 INJECTION, SOLUTION INTRAVENOUS ONCE
Status: COMPLETED | OUTPATIENT
Start: 2018-06-14 | End: 2018-06-15

## 2018-06-14 RX ORDER — ETOMIDATE 2 MG/ML
20 INJECTION INTRAVENOUS ONCE
Status: COMPLETED | OUTPATIENT
Start: 2018-06-14 | End: 2018-06-14

## 2018-06-14 RX ADMIN — SODIUM CHLORIDE 1000 MG: 9 INJECTION, SOLUTION INTRAVENOUS at 18:35

## 2018-06-14 RX ADMIN — POTASSIUM CHLORIDE AND SODIUM CHLORIDE: 900; 150 INJECTION, SOLUTION INTRAVENOUS at 09:00

## 2018-06-14 RX ADMIN — HALOPERIDOL LACTATE 5 MG: 5 INJECTION, SOLUTION INTRAMUSCULAR at 00:12

## 2018-06-14 RX ADMIN — HALOPERIDOL LACTATE 5 MG: 5 INJECTION, SOLUTION INTRAMUSCULAR at 13:58

## 2018-06-14 RX ADMIN — SODIUM CHLORIDE 1500 MG: 9 INJECTION, SOLUTION INTRAVENOUS at 22:38

## 2018-06-14 RX ADMIN — ACYCLOVIR SODIUM 660 MG: 500 INJECTION, SOLUTION INTRAVENOUS at 23:46

## 2018-06-14 RX ADMIN — SODIUM CHLORIDE 750 MG: 9 INJECTION, SOLUTION INTRAVENOUS at 08:24

## 2018-06-14 RX ADMIN — ROCURONIUM BROMIDE 70 MG: 10 INJECTION, SOLUTION INTRAVENOUS at 18:40

## 2018-06-14 RX ADMIN — LORAZEPAM 2 MG: 2 INJECTION INTRAMUSCULAR; INTRAVENOUS at 17:27

## 2018-06-14 RX ADMIN — HALOPERIDOL LACTATE 5 MG: 5 INJECTION, SOLUTION INTRAMUSCULAR at 05:49

## 2018-06-14 RX ADMIN — LORAZEPAM 2 MG: 2 INJECTION INTRAMUSCULAR; INTRAVENOUS at 02:38

## 2018-06-14 RX ADMIN — LORAZEPAM 2 MG: 2 INJECTION INTRAMUSCULAR; INTRAVENOUS at 13:40

## 2018-06-14 RX ADMIN — IOHEXOL 100 ML: 350 INJECTION, SOLUTION INTRAVENOUS at 22:01

## 2018-06-14 RX ADMIN — ACETAMINOPHEN 650 MG: 650 SUPPOSITORY RECTAL at 01:22

## 2018-06-14 RX ADMIN — CEFTRIAXONE 2 G: 2 INJECTION, POWDER, FOR SOLUTION INTRAMUSCULAR; INTRAVENOUS at 10:24

## 2018-06-14 RX ADMIN — VANCOMYCIN HYDROCHLORIDE 2100 MG: 100 INJECTION, POWDER, LYOPHILIZED, FOR SOLUTION INTRAVENOUS at 12:10

## 2018-06-14 RX ADMIN — DEXMEDETOMIDINE HYDROCHLORIDE 1.2 MCG/KG/HR: 100 INJECTION, SOLUTION INTRAVENOUS at 17:39

## 2018-06-14 RX ADMIN — ACYCLOVIR SODIUM 660 MG: 500 INJECTION, SOLUTION INTRAVENOUS at 10:58

## 2018-06-14 RX ADMIN — DEXAMETHASONE SODIUM PHOSPHATE 10 MG: 10 INJECTION, SOLUTION INTRAMUSCULAR; INTRAVENOUS at 10:23

## 2018-06-14 RX ADMIN — LORAZEPAM 2 MG: 2 INJECTION INTRAMUSCULAR; INTRAVENOUS at 13:50

## 2018-06-14 RX ADMIN — ETOMIDATE 20 MG: 2 INJECTION INTRAVENOUS at 18:40

## 2018-06-14 RX ADMIN — POTASSIUM CHLORIDE AND SODIUM CHLORIDE: 900; 150 INJECTION, SOLUTION INTRAVENOUS at 00:12

## 2018-06-14 RX ADMIN — CEFTRIAXONE 2 G: 2 INJECTION, POWDER, FOR SOLUTION INTRAMUSCULAR; INTRAVENOUS at 22:38

## 2018-06-14 RX ADMIN — LORAZEPAM 2 MG: 2 INJECTION INTRAMUSCULAR; INTRAVENOUS at 10:33

## 2018-06-14 RX ADMIN — LORAZEPAM 2 MG: 2 INJECTION INTRAMUSCULAR; INTRAVENOUS at 08:15

## 2018-06-14 RX ADMIN — MIDAZOLAM HYDROCHLORIDE 2 MG: 1 INJECTION, SOLUTION INTRAMUSCULAR; INTRAVENOUS at 14:05

## 2018-06-14 RX ADMIN — LORAZEPAM 2 MG: 2 INJECTION INTRAMUSCULAR; INTRAVENOUS at 18:00

## 2018-06-14 RX ADMIN — LORAZEPAM 2 MG: 2 INJECTION INTRAMUSCULAR; INTRAVENOUS at 13:30

## 2018-06-14 RX ADMIN — ENOXAPARIN SODIUM 40 MG: 100 INJECTION SUBCUTANEOUS at 22:36

## 2018-06-14 RX ADMIN — PROPOFOL 20 MCG/KG/MIN: 10 INJECTION, EMULSION INTRAVENOUS at 19:49

## 2018-06-14 RX ADMIN — SODIUM CHLORIDE, POTASSIUM CHLORIDE, SODIUM LACTATE AND CALCIUM CHLORIDE 1000 ML: 600; 310; 30; 20 INJECTION, SOLUTION INTRAVENOUS at 22:53

## 2018-06-14 RX ADMIN — GADODIAMIDE 20 ML: 287 INJECTION INTRAVENOUS at 21:12

## 2018-06-14 NOTE — PROCEDURES
VIDEO ELECTROENCEPHALOGRAM REPORT        Referring MD: Dr. Mendes.      DOS: 6/13/2018 (total recording of 25 minutes).      INDICATION:  Malathi York 52 y.o. female presenting with altered mental status, seizure.      CURRENT ANTIEPILEPTIC REGIMEN: Levetiracetam, Lorazepam (including 2 mg right before the study).      TECHNIQUE: 30 channel video electroencephalogram (EEG) was performed in accordance with the international 10-20 system. The study was reviewed in bipolar and referential montages. The recording examined an encephalopathic and/or sedated patient.      DESCRIPTION OF THE RECORD:  The study was severely limited by movement artifact, which reduces significantly its yield for diagnosis. The patient appears asleep at times, others restless with constant movement. During sleep, diffuse delta activity and symmetric sleep spindles are noted. During arousals, there is improvement of the background, which appears to fluctuate up to beta frequencies in the posterior regions, likely to reflect the use of sedatives.      ACTIVATION PROCEDURES:   Intermittent Photic stimulation was performed in a stepwise fashion, but was limited by patient's lack of cooperation and movement and non diagnostic.      EKG: sampling of the EKG recording demonstrated sinus rhythm.      EVENTS:  Agitated, confused.      INTERPRETATION:  This is a technically limited video EEG recording in a sedated / encephalopathic patient, due to excessive movement artifact. Otherwise, the study is mildly abnormal due to excessive background activity, likely reflective of the use of benzodiazepines.  No seizures were captured during the study.  Clinical correlation is recommended.     Note: This EEG does not rule out epilepsy.  If the clinical suspicion remains high for seizures, consider obtaining a repeat study at a later time or a prolonged recording to capture clinical or subclinical events may be helpful.           Marcelo Lofton MD  Section  Chief Epilepsy and Neurodiagnostics.   Clinical  of Neurology [employer] [facility].   Diplomate in Neurology, Epilepsy, and Electrodiagnostic Medicine.   Office: [phone]  Fax: [phone]  LILIA HOLGUIN    DD:  06/14/2018 00:21:31  DT:  06/14/2018 00:37:01    D#:  [id number]  Job#:  [id number]

## 2018-06-14 NOTE — PROGRESS NOTES
Late entry: 2120: Received bedside report from ED RN Moreno. Patient attached to transport monitor, and brought to [facility] with chart, all belongings, accompanied by RN, CCT, and family. Patient transferred to ICU bed at 2135, attached to monitoring equipment, assessment started.

## 2018-06-14 NOTE — EEG PROGRESS NOTE
VIDEO ELECTROENCEPHALOGRAM REPORT      Referring MD: Dr. Mendes.     DOS: 6/13/2018 (total recording of 25 minutes).     INDICATION:  Malathi York 52 y.o. female presenting with altered mental status, seizure.     CURRENT ANTIEPILEPTIC REGIMEN: Levetiracetam, Lorazepam (including 2 mg right before the study).     TECHNIQUE: 30 channel video electroencephalogram (EEG) was performed in accordance with the international 10-20 system. The study was reviewed in bipolar and referential montages. The recording examined an encephalopathic and/or sedated patient.     DESCRIPTION OF THE RECORD:  The study was severely limited by movement artifact, which reduces significantly its yield for diagnosis. The patient appears asleep at times, others restless with constant movement. During sleep, diffuse delta activity and symmetric sleep spindles are noted. During arousals, there is improvement of the background, which appears to fluctuate up to beta frequencies in the posterior regions, likely to reflect the use of sedatives.     ACTIVATION PROCEDURES:   Intermittent Photic stimulation was performed in a stepwise fashion, but was limited by patient's lack of cooperation and movement and non diagnostic.     EKG: sampling of the EKG recording demonstrated sinus rhythm.     EVENTS:  Agitated, confused.     INTERPRETATION:  This is a technically limited video EEG recording in a sedated / encephalopathic patient, due to excessive movement artifact. Otherwise, the study is mildly abnormal due to excessive background activity, likely reflective of the use of benzodiazepines.  No seizures were captured during the study.  Clinical correlation is recommended.    Note: This EEG does not rule out epilepsy.  If the clinical suspicion remains high for seizures, consider obtaining a repeat study at a later time or a prolonged recording to capture clinical or subclinical events may be helpful.        Marcelo Lofton MD   Epilepsy and  Neurodiagnostics.   Clinical  of Neurology [facility].   Diplomate in Neurology, Epilepsy, and Electrodiagnostic Medicine.   Office: [phone]  Fax: [phone]

## 2018-06-14 NOTE — PROGRESS NOTES
Pulmonary Critical Care Progress Note        Admit: 6/13/2018  Date of Service:  6/14/2018  Chief Complaint: confusion and difficulty with ambulation    History of Present Illness: 52 y.o. female with a benign past medical history was admitted for acute unspecified encephalopathy from unknown cause, but had a witnessed generalized tonic-clonic seizure in th ER at [facility] who was admitted to the ICU for critical care management and frequent neurological checks.    Review of Systems   Unable to perform ROS: Medical condition     Interval Events:  24 hour interval history reviewed    - very restless overnight and not following or opening eyes   - received Ativan 7mg, Geodon, and KEatmine for CT head   - SR 80-100s   - -170s   - Tmax 100.1   - EEG last night   - incontinence of urine   - neurology following: placed new orders MRV/MRA, LP   - started rocephin, vanco, and zovirax   - no RT issues   - CXR(reviewed from 6/13): clear lung fields      PFSH:  No change.    Physical Exam   Constitutional: She appears well-developed and well-nourished. No distress.   HENT:   Right Ear: External ear normal.   Left Ear: External ear normal.   Nose: Nose normal.   Mouth/Throat: Oropharynx is clear and moist. No oropharyngeal exudate.   Eyes: Conjunctivae and EOM are normal. Pupils are equal, round, and reactive to light. No scleral icterus.   Neck: Normal range of motion. Neck supple. No thyromegaly present.   Cardiovascular: Normal rate, regular rhythm, normal heart sounds and intact distal pulses.    No murmur heard.  Pulmonary/Chest: Breath sounds normal. No respiratory distress. She has no wheezes. She exhibits no tenderness.   Abdominal: Soft. Bowel sounds are normal. She exhibits no distension and no mass. There is no tenderness. There is no guarding.   Musculoskeletal: Normal range of motion. She exhibits no edema or tenderness.   Lymphadenopathy:     She has no cervical adenopathy.   Neurological: She displays normal  reflexes. No sensory deficit.   Very restless/agitated and writhing in bed, not following commands or somnolent from sedation   Skin: Skin is warm and dry. Capillary refill takes less than 2 seconds. No rash noted. She is not diaphoretic.   Psychiatric:   Unable to assess   Nursing note and vitals reviewed.      Respiratory:     Pulse Oximetry: 97 %    HemoDynamics:  Pulse: 99, Heart Rate (Monitored): (!) 101  Blood Pressure: (!) 180/85, NIBP: 137/85         Recent Labs      06/13/18   1504   TROPONINI  <0.01       Neuro:  GCS Total Monroe Coma Score: 15    CT head (reviewed):  FINDINGS: There is no evidence of mass or mass effect. CSF spaces are normal. There is no periventricular chronic small vessel ischemic change present. There is no intracranial hemorrhage seen. The calvarium is intact. There is no scalp injury. There is bilateral maxillary, ethmoid, frontal and sphenoid sinus disease.    Fluids:  Intake/Output       06/12/18 0700 - 06/13/18 0659 (Not Admitted) 06/13/18 0700 - 06/14/18 0659 06/14/18 0700 - 06/15/18 0659      [id number] 4250-5893 Total 4670-0042 0124-6235 Total 2868-6290 6773-1094 Total       Intake    Total Intake -- -- -- -- -- -- -- -- --       Output    Urine  --  -- --  --  0 0  --  -- --    Number of Times Incontinent of Urine -- -- -- -- 2 x 2 x -- -- --    Urine Void (mL) (non-catheter) -- -- -- -- 0 0 -- -- --    Stool  --  -- --  --  -- --  --  -- --    Number of Times Stooled -- -- -- -- 0 x 0 x -- -- --    Total Output -- -- -- -- 0 0 -- -- --       Net I/O     -- -- -- -- 0 0 -- -- --        Weight: 83.9 kg (184 lb 15.5 oz)  Recent Labs      06/13/18   1504   SODIUM  137   POTASSIUM  3.5*   CHLORIDE  106   CO2  21   BUN  12   CREATININE  0.82   MAGNESIUM  1.8   CALCIUM  9.4       GI/Nutrition:    Liver Function  Recent Labs      06/13/18   1504   ALTSGPT  27   ASTSGOT  22   ALKPHOSPHAT  42   TBILIRUBIN  0.6   GLUCOSE  90       Heme:  Recent Labs      06/13/18   1504   RBC  4.15*    HEMOGLOBIN  13.4   HEMATOCRIT  38.2   PLATELETCT  245   PROTHROMBTM  13.4   APTT  23.4*   INR  1.05       Infectious Disease:  Temp  Av.4 °C (99.4 °F)  Min: 36.9 °C (98.5 °F)  Max: 38.1 °C (100.6 °F)  Micro: reviewed  Recent Labs      18   1504   WBC  10.2   NEUTSPOLYS  64.90   LYMPHOCYTES  27.30   MONOCYTES  5.70   EOSINOPHILS  1.10   BASOPHILS  0.60   ASTSGOT  22   ALTSGPT  27   ALKPHOSPHAT  42   TBILIRUBIN  0.6     Current Facility-Administered Medications   Medication Dose Frequency Provider Last Rate Last Dose   • acetaminophen (TYLENOL) suppository 650 mg  650 mg Q6HRS PRN Puneet Woo M.D.   650 mg at 18 0122   • levETIRAcetam (KEPPRA) 750 mg in  mL IVPB  750 mg Q12HRS Anderson Mendes M.D.   Stopped at 18 1913   • senna-docusate (PERICOLACE or SENOKOT S) 8.6-50 MG per tablet 2 Tab  2 Tab BID Maurilio Schumacher M.D.   Stopped at 18 2100    And   • polyethylene glycol/lytes (MIRALAX) PACKET 1 Packet  1 Packet QDAY PRN Maurilio Schumacher M.D.        And   • magnesium hydroxide (MILK OF MAGNESIA) suspension 30 mL  30 mL QDAY PRN Maurilio Schumacher M.D.        And   • bisacodyl (DULCOLAX) suppository 10 mg  10 mg QDAY PRN Maurilio Schumacher M.D.       • Respiratory Care per Protocol   Continuous RT Maurilio Schumacehr M.D.       • acetaminophen (TYLENOL) tablet 650 mg  650 mg Q6HRS PRN Maurilio Schumacher M.D.       • ondansetron (ZOFRAN) syringe/vial injection 4 mg  4 mg Q4HRS PRN Maurilio Schumacher M.D.   4 mg at 186   • ondansetron (ZOFRAN ODT) dispertab 4 mg  4 mg Q4HRS PRN Maurilio Schumacher M.D.       • promethazine (PHENERGAN) tablet 12.5-25 mg  12.5-25 mg Q4HRS PRN Maurilio Schumacher M.D.       • promethazine (PHENERGAN) suppository 12.5-25 mg  12.5-25 mg Q4HRS PRN Maurilio Schumacher M.D.       • prochlorperazine (COMPAZINE) injection 5-10 mg  5-10 mg Q4HRS PRN Maurilio Schumacher M.D.       • LORazepam (ATIVAN) injection  4 mg  4 mg Q10 MIN PRN Maurilio Schumacher M.D.   2 mg at 06/14/18 0238   • 0.9 % NaCl with KCl 20 mEq infusion   Continuous Puneet Woo M.D. 100 mL/hr at 06/14/18 0012     • haloperidol lactate (HALDOL) injection 2-5 mg  2-5 mg Q4HRS PRN Puneet Woo M.D.   5 mg at 06/14/18 0012     Last reviewed on 6/13/2018  4:43 PM by Pina Thapa, [facility]    Quality  Measures:  Radiology images reviewed, Labs reviewed, Medications reviewed and EKG reviewed  Sam catheter: Critically Ill - Requiring Accurate Measurement of Urinary Output        DVT prophylaxis - mechanical: SCDs  Ulcer prophylaxis: Not indicated          Problems/Plan:    Acute Unspecified Encephalopathy   - CT head negative   - UDS negative as well as metabolic panel   - ? Related to seizures   - ? Viral etiology   - q 2 hr neuro checks   - neurology following   - started decadron, ceftriaxone, acyclovir, and vancomycin   - need CTA head/neck   - need MRV    - need MRI and LP-->anesthesia and IR tomorrow   - low threshold for intubation for airway protection    Acute Generalized Tonic-Clonic Seizure   - neurology following   - s/p Keppra 2g load and will continue Keppra 750mg IV BID   - cont seizure precautions   - EEG pending   - need MRI and LP-->anesthesia and IR tomorrow    Hypokalemia   - repleting    Acute Leukocytosis   - ?reactive vs infectious   - cont to follow and follow fever curve   - decadron started prior to starting ceftriaxone, vanco, and acyclovir    Prophylaxis   - SCDs      Discussed patient condition and risk of morbidity and/or mortality with Family, RN, RT, Therapies, Pharmacy, Dietary, , Charge nurse / hot rounds, Patient and hospitalist.

## 2018-06-14 NOTE — CARE PLAN
Problem: Safety  Goal: Free from accidental injury  Pt with seizure precautions in place. Pt bed side rails padded. Bed locked and in lowest position, bed alarm on. Pt rounded on frequently and Q 1hr. Family at bedside with Pt.     Problem: Skin Integrity  Goal: Skin Integrity is maintained or improved  Pt skin kept clean and dry. All lines and medical equipment kept clear of skin. Pt turned q 2 hrs.

## 2018-06-14 NOTE — PROGRESS NOTES
"S- agitated semipurposeful mvmts trying to pull out lines, moving all extremities, looks towards family and calms when aware present. Not verbal.  No other complaints. Long discussion with family almost an hour, no hx to suggest headache or meningitis or risks for same or exposures [place] ranch other than ticks which aware of and not found recent. No herpes sores. Does have strong history of seizure in family her grandad and his sister have stress induced seizures. Odd behavior since Tuesday, aggressive driving, unable to communicate well, asking for sunblock rather than shoes, misstating then correcting herself, but no headache or chills or neck stiffness.  Stared off at times prior to admission but not in past.     O-   Allergies:   Allergies   Allergen Reactions   • Penicillins Unspecified     \"childhood\"         Current Facility-Administered Medications:   •  acetaminophen (TYLENOL) suppository 650 mg, 650 mg, Rectal, Q6HRS PRN, Puneet Woo M.D., 650 mg at 06/14/18 0122  •  levETIRAcetam (KEPPRA) 750 mg in  mL IVPB, 750 mg, Intravenous, Q12HRS, Anderson Mendes M.D., Stopped at 06/14/18 0839  •  senna-docusate (PERICOLACE or SENOKOT S) 8.6-50 MG per tablet 2 Tab, 2 Tab, Oral, BID, Stopped at 06/13/18 2100 **AND** polyethylene glycol/lytes (MIRALAX) PACKET 1 Packet, 1 Packet, Oral, QDAY PRN **AND** magnesium hydroxide (MILK OF MAGNESIA) suspension 30 mL, 30 mL, Oral, QDAY PRN **AND** bisacodyl (DULCOLAX) suppository 10 mg, 10 mg, Rectal, QDAY PRN, Maurilio Schumacher M.D.  •  Respiratory Care per Protocol, , Nebulization, Continuous RT, Maurilio Schumacher M.D.  •  acetaminophen (TYLENOL) tablet 650 mg, 650 mg, Oral, Q6HRS PRN, Maurilio Schumacher M.D.  •  ondansetron (ZOFRAN) syringe/vial injection 4 mg, 4 mg, Intravenous, Q4HRS PRN, Maurilio Schumacher M.D., 4 mg at 06/13/18 2046  •  ondansetron (ZOFRAN ODT) dispertab 4 mg, 4 mg, Oral, Q4HRS PRN, Maurilio Schumacher M.D.  •  promethazine " (PHENERGAN) tablet 12.5-25 mg, 12.5-25 mg, Oral, Q4HRS PRN, Maurilio Schumacher M.D.  •  promethazine (PHENERGAN) suppository 12.5-25 mg, 12.5-25 mg, Rectal, Q4HRS PRN, Maurilio Schumacher M.D.  •  prochlorperazine (COMPAZINE) injection 5-10 mg, 5-10 mg, Intravenous, Q4HRS PRN, Maurilio Schumacher M.D.  •  LORazepam (ATIVAN) injection 4 mg, 4 mg, Intravenous, Q10 MIN PRN, Maurilio Schumacher M.D., 2 mg at 06/14/18 0815  •  0.9 % NaCl with KCl 20 mEq infusion, , Intravenous, Continuous, Puneet Woo M.D., Last Rate: 100 mL/hr at 06/14/18 0900  •  haloperidol lactate (HALDOL) injection 2-5 mg, 2-5 mg, Intravenous, Q4HRS PRN, Puneet Woo M.D., 5 mg at 06/14/18 0549      PHYSICAL EXAM    Vitals:    06/14/18 0300 06/14/18 0400 06/14/18 0500 06/14/18 0600   BP:       Pulse: 99 95 94 (!) 109   Resp: (!) 32 (!) 31 (!) 29 (!) 34   Temp:  37.5 °C (99.5 °F)  37.4 °C (99.3 °F)   SpO2: 97% 97% 97% 95%   Weight:       Height:           Head/Neck: NCAT no meningismus neg kernig neg brudzinski. No obvious mass or heard bruit. No tender arteries or lost pulses.  Skin: Warm, dry, intact. No rashes observed head/neck or body  Eyes/Funduscopic: unable    Mental Status: Agitated, no command follow  Cranial Nerves:  PERRL 3mm.   No afferent pupillary defect. sym grimace & eye closure. No nystagmus.     Motor: strength/bulk/tone wnl.  intact and sym, no abn mvmts TORRES x4 strongly   Sensory: too agitated to respond meaningfully to pain  Coordination: n/a  DTR's: too tense  Gait/Station: n/a            Labs:  Recent Labs      06/13/18   1504  06/14/18   0548   WBC  10.2  14.5*   RBC  4.15*  4.00*   HEMOGLOBIN  13.4  12.8   HEMATOCRIT  38.2  36.5*   MCV  92.0  91.3   MCH  32.3  32.0   MCHC  35.1*  35.1*   RDW  50.0  48.5   PLATELETCT  245  237   MPV  8.4*  8.4*     Recent Labs      06/13/18   1504  06/14/18   0548   SODIUM  137  135   POTASSIUM  3.5*  4.0   CHLORIDE  106  105   CO2  21  21   GLUCOSE  90  118*    BUN  12  8   CREATININE  0.82  0.75   CALCIUM  9.4  8.5     Recent Labs      06/13/18   1504   APTT  23.4*   INR  1.05         Recent Labs      06/13/18   1504   TROPONINI  <0.01         Recent Labs      06/13/18   1504  06/14/18   0548   SODIUM  137  135   POTASSIUM  3.5*  4.0   CHLORIDE  106  105   CO2  21  21   GLUCOSE  90  118*   BUN  12  8     Recent Labs      06/13/18   1504  06/14/18   0548   SODIUM  137  135   POTASSIUM  3.5*  4.0   CHLORIDE  106  105   CO2  21  21   BUN  12  8   CREATININE  0.82  0.75   MAGNESIUM  1.8   --    CALCIUM  9.4  8.5     Recent Labs      06/13/18   1504   APTT  23.4*   INR  1.05     No results found for this or any previous visit.           Imaging: neuroimaging reviewed and directly visualized by me  DX-CHEST-LIMITED (1 VIEW)   Final Result         No acute cardiac or pulmonary abnormality is identified.      CT-HEAD W/O   Final Result      No evidence of acute intracranial process.      MR-BRAIN-W/O    (Results Pending)   EEG INTERPRETATION:  This is a technically limited video EEG recording in a sedated / encephalopathic patient, due to excessive movement artifact. Otherwise, the study is mildly abnormal due to excessive background activity, likely reflective of the use of benzodiazepines.  No seizures were captured during the study.  Clinical correlation is recommended.     Note: This EEG does not rule out epilepsy.  If the clinical suspicion remains high for seizures, consider obtaining a repeat study at a later time or a prolonged recording to capture clinical or subclinical events may be helpful.        Assessment/Plan:    GTC SEIZURE W/ SEVERE POST ICTAL CONFUSION/AGITATION, AMS PRIOR LIKELY CPS WITH STARING SPELLS    CONCERN FOR ENCEPHALOPATHY OF UNKNOWN ETIOLOGY E.G. ENCEPHALITIS, OR SEIZURES WORSENED DUE TO LOWERED SZ THRESHOLD WITH HALDOL      SZ PRECAUTIONS    KEPPRA 1500/1500, 1GM IV GIVEN EXTRA    GEODON OR SEROQUEL FOR PSYCHOSIS/AGITATION, AVOID HIGH POTENCY  ANTIPSYCHOTICS     CEEG     MR BRAIN W/ & W/O CONTRAST, SZ PROTOCOL    CTA/CTV HEAD    LP BY IR NEEDS ANESTHESIA VERY AGITATED    ID CONSULT FOR CSF LABS (rancher may have atypical exposures)    EMPIRIC ABX/ANTIVIRAL FOR CONCERN FOR ENCEPHALITIS, DECADRON FIRST    I personally provided 80 minutes of total critical care time outside of time spent on separately billable/documented procedures. Time includes: review of laboratory data, review of radiology studies, discussion with consultants, discussion with family/patient, monitoring for potential decompensation.  Interventions were performed as documented above.       Anderson Mendes M.D.  , Neurohospitalist & Stroke  Clinical Professor, [employer]  Diplomate, Neurology & Neuroimaging

## 2018-06-14 NOTE — PROGRESS NOTES
Removed yellow metal ring with clear stone from left hand, and silver metal ring from left foot. Ring's put in container, sealed with patient label. Given to , Jere at bedside.

## 2018-06-14 NOTE — PROGRESS NOTES
Am Bedside report received from NOC RN. Lines labs med's and orders reviewed. Plan of care discussed. Neuro assessment completed. Pt restless, agitated, moaning, non verbal, does not open eyes to voice or spont, moving arms and legs in flailing motion, non purposeful, does not follow commands. Pt's  at bedside.

## 2018-06-14 NOTE — PROGRESS NOTES
Jeffrey called to update RN that Anesthesia and IR Radiology MD can not coordinate Lumbar Puncture until tomorrow at 1300.  Dr Mendes  Called and updated about this information. Dr Butler updated.

## 2018-06-14 NOTE — PROGRESS NOTES
1325 Pt Transported to CT Scan via bed with Rn, Transport tech, cardiac monitor, 02 3 L oxymask. Pt returned to [facility] R 106 at 1420. Unable to complete the CT scan ordered. Pt restless, agitated, flailing arms and legs, head in constant motion moving side to side. Pt received at total of 6 mg Ativan, 5 mg Haldol, and 2 mg of Versed over time period 4002-2810 (see MAR). Still unable to complete the CT scan. Pt returned to room. Dr Butler and and Dr Shaista Torres updated.

## 2018-06-14 NOTE — PROGRESS NOTES
Dr Mendes called for update on Pt. Updated that Pt still restless, flails arms and legs around in bed, Pt does not open eyes to voice and does not follow commands. Pt restless and agitated and moans but non verbal. Md states ok to medicate Pt with Ativan 2mg for agitation as needed. Discussed Plan of care.

## 2018-06-14 NOTE — PROGRESS NOTES
[facility] Hospitalist Progress Note    Date of Service: 2018    Chief Complaint  52 y.o. female admitted 2018 with encephalopathy and seizure    Interval Problem Update  Restless does not follow command  -130  Afebrile   EEG   Started on rocefin vanc and zovirax and received dose of Decadron      Consultants/Specialty  Neurology  Critical care    Disposition  ICU        Review of Systems   Unable to perform ROS: Mental status change      Physical Exam  Laboratory/Imaging   Hemodynamics  Temp (24hrs), Av.4 °C (99.4 °F), Min:36.9 °C (98.5 °F), Max:38.1 °C (100.6 °F)   Temperature: 37.4 °C (99.3 °F)  Pulse  Av.4  Min: 94  Max: 119 Heart Rate (Monitored): (!) 108  Blood Pressure: (!) 180/85, NIBP: 118/82      Respiratory      Respiration: (!) 34, Pulse Oximetry: 95 %, O2 Daily Delivery Respiratory : Silicone Nasal Cannula        RUL Breath Sounds: Clear, RML Breath Sounds: Clear, RLL Breath Sounds: Diminished, WENDY Breath Sounds: Clear, LLL Breath Sounds: Diminished    Fluids    Intake/Output Summary (Last 24 hours) at 18 0948  Last data filed at 18 0800   Gross per 24 hour   Intake             1120 ml   Output                0 ml   Net             1120 ml       Nutrition  Orders Placed This Encounter   Procedures   • Diet NPO     Standing Status:   Standing     Number of Occurrences:   1     Order Specific Question:   Restrict to:     Answer:   Strict [1]     Physical Exam   Constitutional: She appears well-developed and well-nourished.   HENT:   Head: Normocephalic and atraumatic.   Right Ear: External ear normal.   Left Ear: External ear normal.   Mouth/Throat: No oropharyngeal exudate.   Eyes: Conjunctivae are normal. Pupils are equal, round, and reactive to light. Right eye exhibits no discharge. Left eye exhibits no discharge. No scleral icterus.   Neck: Neck supple. No JVD present. No tracheal deviation present.   Cardiovascular: Regular rhythm.  Exam reveals no gallop and no  friction rub.    No murmur heard.  Tachycardia   Pulmonary/Chest: Effort normal and breath sounds normal. No respiratory distress. She exhibits no tenderness.   Abdominal: Soft. Bowel sounds are normal. She exhibits no distension. There is no tenderness. There is no rebound.   Musculoskeletal: She exhibits no edema or tenderness.   Neurological:   Lethargic easily agitated does not follow command no focal deficits noted   Skin: Skin is warm and dry. She is not diaphoretic. No cyanosis or erythema. Nails show no clubbing.   Psychiatric: Cognition and memory are impaired. She expresses impulsivity. She is noncommunicative.   Nursing note and vitals reviewed.      Recent Labs      06/13/18   1504  06/14/18   0548   WBC  10.2  14.5*   RBC  4.15*  4.00*   HEMOGLOBIN  13.4  12.8   HEMATOCRIT  38.2  36.5*   MCV  92.0  91.3   MCH  32.3  32.0   MCHC  35.1*  35.1*   RDW  50.0  48.5   PLATELETCT  245  237   MPV  8.4*  8.4*     Recent Labs      06/13/18   1504  06/14/18   0548   SODIUM  137  135   POTASSIUM  3.5*  4.0   CHLORIDE  106  105   CO2  21  21   GLUCOSE  90  118*   BUN  12  8   CREATININE  0.82  0.75   CALCIUM  9.4  8.5     Recent Labs      06/13/18   1504   APTT  23.4*   INR  1.05                  Assessment/Plan     * Encephalopathy acute   Assessment & Plan    Likely toxic etiology not entirely clear  Possible underlying encephalitis/meningitis  Empirically started on Vanco ceftriaxone and acyclovir pending LP under anesthesia given agitation  Follow-up on MRI  CTA and LP results  Discussed with  neurology and critical care        Seizures (HCC)   Assessment & Plan    Continue Keppra  No seizure activity noted on EEG        Hydradenitis   Assessment & Plan    No signs of active infection at this time        Leukocytosis   Assessment & Plan    ?  Reactive continue to monitor and follow up on cultures          Quality-Core Measures   Reviewed items::  Labs reviewed, Medications reviewed and Radiology images  reviewed  Sam catheter::  Critically Ill - Requiring Accurate Measurement of Urinary Output  DVT prophylaxis pharmacological::  Contraindicated - High bleeding risk  DVT prophylaxis - mechanical:  SCDs  Antibiotics:  Treating active infection/contamination beyond 24 hours perioperative coverage      Plan of care reviewed with patient's  and his questions answered    Addendum patient remains severely agitated at risk of self injury and staff injury, will start her on Precedex and continue close monitoring

## 2018-06-14 NOTE — ED NOTES
[place] assessment complete. Pt in front of nurse's station, yellow band and socks applied. Green triangle sign placed on pt's door.

## 2018-06-14 NOTE — PROGRESS NOTES
Dr Shaista Torres by to see pt updates given. Updated on unable to complete Lumbar Puncture in IR today.

## 2018-06-14 NOTE — H&P
" Hospital Medicine History and Physical    Date of Service  2018    Chief Complaint  Chief Complaint   Patient presents with   • ALOC       History of Presenting Illness  53 yo female with no significant pmh is coming today due to alert mental status started last night with some confusion but this morning she got worse, patient upon arrival to ER she had a seizure she was started on keppra she was given lorazepam, neurologist got consulted, had CT head that did not show acute findings, patient at this time is very poor historian and information is from records and family and ERP, patient as per family she went to see her dermatologist in [place] and she return yesterday as per family she was noticed to be tiered and she was consulted that it was thought to be due to her recent travel she went to bed but this morning she was more confused, as per family there is no h/o fever, chills, cough, headache, focal weakness, no rash, no chest pain, no abdominal pain, no ill contact no other travel, no N/V/D/C.   In the ER initial blood work is unremarkable, utox neg UA is neg.      Primary Care Physician  No primary care provider on file.    Consultants  Neuro  Critical care    Code Status  Full code    Review of Systems  Review of Systems   Unable to perform ROS: Acuity of condition          Past Medical History  Unable to obtain.     Surgical History  Unable to obtain    Medications    No prescribed medication.   Family History  History reviewed. No pertinent family history.   No contributory.    Social History  Unable to obtain.     Allergies  Allergies   Allergen Reactions   • Penicillins Unspecified     \"childhood\"        Physical Exam  Laboratory   Hemodynamics  Temp (24hrs), Av.3 °C (99.1 °F), Min:36.9 °C (98.5 °F), Max:37.6 °C (99.7 °F)   Temperature: 37.6 °C (99.7 °F)  Pulse  Av.6  Min: 110  Max: 119 Heart Rate (Monitored): (!) 111  Blood Pressure: (!) 180/85, NIBP: 129/74      Respiratory      " Respiration: (!) 24, Pulse Oximetry: 99 %             Physical Exam   Constitutional: She appears distressed.   HENT:   Head: Normocephalic.   Mouth/Throat: No oropharyngeal exudate.   Eyes: Conjunctivae are normal. Right eye exhibits no discharge. Left eye exhibits no discharge. No scleral icterus.   Neck: Normal range of motion. Neck supple. No JVD present.   Cardiovascular: Regular rhythm and normal heart sounds.  Exam reveals no gallop and no friction rub.    Pulmonary/Chest: Effort normal and breath sounds normal. No respiratory distress. She has no wheezes. She has no rales.   Abdominal: Soft. Bowel sounds are normal. She exhibits no distension. There is no tenderness. There is no rebound.   Musculoskeletal: Normal range of motion.   Neurological: She has normal strength. No cranial nerve deficit or sensory deficit.   Not oriented,  Agitated  Moving all 4 ext  Does not follows commands     Skin: She is diaphoretic. No erythema.   Psychiatric:   Can't assess due to acute medical problems.    Nursing note and vitals reviewed.      Recent Labs      06/13/18   1504   WBC  10.2   RBC  4.15*   HEMOGLOBIN  13.4   HEMATOCRIT  38.2   MCV  92.0   MCH  32.3   MCHC  35.1*   RDW  50.0   PLATELETCT  245   MPV  8.4*     Recent Labs      06/13/18   1504   SODIUM  137   POTASSIUM  3.5*   CHLORIDE  106   CO2  21   GLUCOSE  90   BUN  12   CREATININE  0.82   CALCIUM  9.4     Recent Labs      06/13/18   1504   ALTSGPT  27   ASTSGOT  22   ALKPHOSPHAT  42   TBILIRUBIN  0.6   GLUCOSE  90     Recent Labs      06/13/18   1504   APTT  23.4*   INR  1.05             Lab Results   Component Value Date    TROPONINI <0.01 06/13/2018       Imaging  Ct head reviewed. No acute findings.   cxr no infiltrates.    Assessment/Plan     I anticipate this patient will require at least two midnights for appropriate medical management, necessitating inpatient admission.    Tachycardia   Assessment & Plan    Likely reactive to stress. Continue  monitoring.         Seizures (HCC)   Assessment & Plan    Started on keppra, MRI, EEG, neuro consulted.          Encephalopathy acute   Assessment & Plan    Toxic/metabolic probably due to seizure, post ictal, patient is getting admitted to ICU, getting 24 hrs EEG, MRI brain pending, neuro consulted,         very ill patient, condition guarded.     VTE prophylaxis: scd's.

## 2018-06-14 NOTE — CONSULTS
PULMONARY AND CRITICAL CARE MEDICINE CONSULTATION    Date of Consultation:  6/13/2018    Requesting Physician:  Maurilio Schumacher MD    Consulting Physician:  Puneet Woo MD    Reason for Consultation:  Critical care management in lady with acute encephalopathy and seizures    Chief Complaint:  Encephalopathy    History of Present Illness:    I was kindly asked to see and evaluate Malathi York, a 52 y.o. female for evaluation and management of the above problem.    The entire history is obtained from healthcare providers and the medical record as this lady cannot give me any history.  Apparently this lady has no significant prior medical history.  She was transferred to [facility] from an outside facility.  She was brought to the outside facility due to confusion and difficulty with ambulation.  It was suspected that she may have had a TIA.  When she arrived in the [facility] emergency department she had a generalized tonic-clonic seizure.  She was seen by neurology.  She has received intravenous lorazepam.  Additionally she has been loaded with Keppra.  She remains encephalopathic at this time and is now in the ICU for close neurological monitoring.    Medications Prior to Admission:    No current facility-administered medications on file prior to encounter.      No current outpatient prescriptions on file prior to encounter.       Current Medications:      Current Facility-Administered Medications:   •  levETIRAcetam (KEPPRA) 750 mg in  mL IVPB, 750 mg, Intravenous, Q12HRS, Anderson Mendes M.D., Stopped at 06/13/18 1913  •  senna-docusate (PERICOLACE or SENOKOT S) 8.6-50 MG per tablet 2 Tab, 2 Tab, Oral, BID, Stopped at 06/13/18 2100 **AND** polyethylene glycol/lytes (MIRALAX) PACKET 1 Packet, 1 Packet, Oral, QDAY PRN **AND** magnesium hydroxide (MILK OF MAGNESIA) suspension 30 mL, 30 mL, Oral, QDAY PRN **AND** bisacodyl (DULCOLAX) suppository 10 mg, 10 mg, Rectal, QDAY PRN, Maurilio Schumacher,  "M.D.  •  Respiratory Care per Protocol, , Nebulization, Continuous RT, Maurilio Schumacher M.D.  •  acetaminophen (TYLENOL) tablet 650 mg, 650 mg, Oral, Q6HRS PRN, Maurilio Schumacher M.D.  •  ondansetron (ZOFRAN) syringe/vial injection 4 mg, 4 mg, Intravenous, Q4HRS PRN, Maurilio Schumacher M.D., 4 mg at 06/13/18 2046  •  ondansetron (ZOFRAN ODT) dispertab 4 mg, 4 mg, Oral, Q4HRS PRN, Maurilio Schumacher M.D.  •  promethazine (PHENERGAN) tablet 12.5-25 mg, 12.5-25 mg, Oral, Q4HRS PRN, Maurilio Schumacher M.D.  •  promethazine (PHENERGAN) suppository 12.5-25 mg, 12.5-25 mg, Rectal, Q4HRS PRN, Maurilio Schumacher M.D.  •  prochlorperazine (COMPAZINE) injection 5-10 mg, 5-10 mg, Intravenous, Q4HRS PRN, Maurilio Schumacher M.D.  •  LORazepam (ATIVAN) injection 4 mg, 4 mg, Intravenous, Q10 MIN PRN, Maurilio Schumacher M.D., 2 mg at 06/13/18 2222  •  0.9 % NaCl with KCl 20 mEq infusion, , Intravenous, Continuous, Puneet Woo M.D.    Allergies:    Penicillins    Past Surgical History:    History reviewed. No pertinent surgical history.    Past Medical History:    History reviewed. No pertinent past medical history.    Social History:    Social History     Social History   • Marital status:      Spouse name: N/A   • Number of children: N/A   • Years of education: N/A     Occupational History   • Not on file.     Social History Main Topics   • Smoking status: Not on file   • Smokeless tobacco: Not on file   • Alcohol use Not on file   • Drug use: Unknown   • Sexual activity: Not on file     Other Topics Concern   • Not on file     Social History Narrative   • No narrative on file       Family History:    History reviewed. No pertinent family history.    Review of System:    Review of Systems   Unable to perform ROS: Medical condition       Physical Examination:    BP (!) 180/85   Pulse 95   Temp 37.3 °C (99.1 °F)   Resp (!) 28   Ht 1.75 m (5' 8.9\")   Wt 83.9 kg (185 lb)   SpO2 100%   " BMI 27.40 kg/m²   Physical Exam   HENT:   Head: Normocephalic and atraumatic.   Right Ear: External ear normal.   Left Ear: External ear normal.   Nose: Nose normal.   Mouth/Throat: Oropharynx is clear and moist. No oropharyngeal exudate.   Eyes: Conjunctivae and EOM are normal. Pupils are equal, round, and reactive to light. Right eye exhibits no discharge. Left eye exhibits no discharge. No scleral icterus.   Neck: Normal range of motion. Neck supple. No tracheal deviation present.   Cardiovascular: Intact distal pulses.  Exam reveals no gallop and no friction rub.    No murmur heard.  Sinus rhythm   Pulmonary/Chest: No stridor. She has no wheezes. She has no rales.   Abdominal: Soft. Bowel sounds are normal. She exhibits no distension. There is no tenderness. There is no rebound.   Musculoskeletal: She exhibits no edema, tenderness or deformity.   No clubbing or cyanosis   Neurological:   She is currently sedated.  She will arouse.  She becomes agitated at times.  She moves all 4 extremities.   Skin: Skin is warm and dry. No rash noted. She is not diaphoretic. No erythema. No pallor.       Laboratory Data:          Invalid input(s): [id number]  Recent Labs      06/13/18   1504   WBC  10.2   RBC  4.15*   HEMOGLOBIN  13.4   HEMATOCRIT  38.2   MCV  92.0   MCH  32.3   MCHC  35.1*   RDW  50.0   PLATELETCT  245   MPV  8.4*     Recent Labs      06/13/18   1504   SODIUM  137   POTASSIUM  3.5*   CHLORIDE  106   CO2  21   GLUCOSE  90   BUN  12   CREATININE  0.82   CALCIUM  9.4         Recent Labs      06/13/18   1504   TROPONINI  <0.01     Recent Labs      06/13/18   1953   METHADONE  Negative   OPIATES  Negative   CANNABINOID  Negative   AMPHUR  Negative       Imaging:    I personally viewed the CXR and CT scan images as well as reviewed the radiology interpretation reports.    DX-CHEST-LIMITED (1 VIEW)   Final Result         No acute cardiac or pulmonary abnormality is identified.      CT-HEAD W/O   Final Result       No evidence of acute intracranial process.      MR-BRAIN-W/O    (Results Pending)       Assessment and Plan:    Acute unspecified encephalopathy   -Head CT without acute pathology   -Possibly related to seizures    Generalized tonic-clonic seizure   -She has been loaded with 2 g of intravenous Keppra   -Continue Keppra, 750 mg IV every 12 hours   -Seizure precautions   -EEG    Hypokalemia   -Replete potassium      This lady is critically ill in the ICU with an acute unspecified encephalopathy.  She has had a witnessed generalized tonic-clonic seizure and has been loaded with Keppra.  I have assessed and reassessed her neurologic status.  She is at increased risk for worsening CNS system dysfunction.    High risk of deterioration and worsening vital organ dysfunction and death without the above critical care interventions.    Thank you for allowing me to participate in the care of this lady.  I will continue to follow her with great interest.    Critical Care Time:  33 minutes  07518  No time overlap  Time excludes procedures  Discussed with RN, hospitalist    Puneet Woo MD  Pulmonary and Critical Care Medicine

## 2018-06-14 NOTE — CARE PLAN
Problem: Safety  Goal: Free from accidental injury  Outcome: PROGRESSING AS EXPECTED  Bed alarm on, bed in lowest position, appropriate sign's outside patient room, patient educated on fall interventions in place, educated on use of call light. Patient unable to demonstrate understanding, will continue to reinforce.     Problem: Skin Integrity  Goal: Skin Integrity is maintained or improved  Outcome: NOT MET  Patient's skin assessed at beginning of shift, patient turned q2 hours, devices repositioned q2 hours. Patient educated on purpose of turns and repositioning, patient unable to demonstrate understanding, will continue to reinforce.

## 2018-06-14 NOTE — CONSULTS
CC:  Not offered     Date of Admission: 6/13/2018    Today's Date: 06/13/18    Consulting Physician: Dao Medley M.D.      HPI:    Malathi York is a 52 y.o. female sent for mild transient TIA but unclear symptoms improving with time no exacerbating elements, patient doesn't offer.  Per EMS TLKW was last night 8pm approx bedtime but restless at night and this AM upon awaking confused and bizarre per family, awoke today 0600 with symptoms moderate.  Family reported no headache or illness prior.  Sleep dep recent with wedding coming and stress could have exacerbated symptoms, father has sz disorder set off by stress.  No other complaints.  No sz hx or risks reported.       ROS:   Constitutional: No fevers or chills.  Eyes: No blurry vision or eye pain.  ENT: No dysphagia or hearing loss.  Respiratory: No cough or shortness of breath.  Cardiovascular: No chest pain or palpitations.  GI: No nausea, vomiting, or diarrhea.  : No urinary incontinence or dysuria.  Musculoskeletal: No joint swelling or arthralgias.  Skin: No skin rashes.  Neuro: See HPI.  Endocrine: No heat or cold intolerance. No polydipsia or polyuria.  Psych: No depression or anxiety.  Heme/Lymph: No easy bruising or swollen lymph nodes.  All other systems were reviewed and were negative.       Past Medical History: No past medical history on file.    Past Surgical History: History reviewed. No pertinent surgical history.    Social History:   Social History     Social History   • Marital status:      Spouse name: N/A   • Number of children: N/A   • Years of education: N/A     Occupational History   • Not on file.     Social History Main Topics   • Smoking status: Not on file   • Smokeless tobacco: Not on file   • Alcohol use Not on file   • Drug use: Unknown   • Sexual activity: Not on file     Other Topics Concern   • Not on file     Social History Narrative   • No narrative on file       Family History: History reviewed. No pertinent  "family history.    Allergies:   Allergies   Allergen Reactions   • Penicillins Unspecified     \"childhood\"       No current facility-administered medications for this encounter.     Current Outpatient Prescriptions:   •  multivitamin (THERAGRAN) Tab, Take 1 Tab by mouth every day., Disp: , Rfl:   •  Multiple Vitamins-Minerals (ZINC PO), Take 1 Tab by mouth every day., Disp: , Rfl:       PHYSICAL EXAM    Vitals:    06/13/18 1638 06/13/18 1644 06/13/18 1647 06/13/18 1746   BP:   (!) 186/80 (!) 180/85   Pulse: (!) 119 (!) 111 (!) 115 (!) 113   Resp: (!) 23 (!) 27 (!) 28 (!) 24   Temp:       SpO2: 98% 99%  99%   Weight:       Height:           Head/Neck: NCAT no meningismus neg kernig neg brudzinski. No obvious mass or heard bruit. No tender arteries or lost pulses.  No rash of head or neck.  Skin: Warm, dry, intact. No rashes observed head/neck or body  Eyes/Funduscopic: unable    Mental Status: Awake, alert, oriented but slow to respond. Names/repeats/fluent/follows commands. No neglect/extinction. Attention and concentration, recent & remote memory, Fund of Knowledge not full. Odd behavior not fully engaged in exam takes time to process info.  Cranial Nerves: CN II-XII intact. PERRL 3mm.   No afferent pupillary defect. EOM full. VF full. sym grimace & eye closure. No nystagmus.     Motor: right leg had trouble holding up seemed due to command follow issue as started to stare off blankly likely early sz otherwise strength/bulk/tone wnl.  intact and sym  Sensory: Intact light touch & sharp  Coordination: unable LE, ftn intact  DTR's: unable  Gait/Station: n/a fall concern     NIHSS: 3 for left leg weakness, unable to finish last sections when had seizure GTC    STARED OFF TO PATIENT'S RIGHT, THEN ODD MOVEMENT OF HANDS LIKE RUBBING SOMETHING OFF FACE, THEN LARGE GTC ARMS EXTEND FORWARD POSTURE WRISTS FLEXED SYMMETRIC TONIC CLONIC    Labs:  Recent Labs      06/13/18   1504   WBC  10.2   RBC  4.15*   HEMOGLOBIN  13.4 " "  HEMATOCRIT  38.2   MCV  92.0   MCH  32.3   MCHC  35.1*   RDW  50.0   PLATELETCT  245   MPV  8.4*     Recent Labs      06/13/18   1504   SODIUM  137   POTASSIUM  3.5*   CHLORIDE  106   CO2  21   GLUCOSE  90   BUN  12   CREATININE  0.82   CALCIUM  9.4     Recent Labs      06/13/18   1504   APTT  23.4*   INR  1.05         Recent Labs      06/13/18   1504   TROPONINI  <0.01         Recent Labs      06/13/18   1504   SODIUM  137   POTASSIUM  3.5*   CHLORIDE  106   CO2  21   GLUCOSE  90   BUN  12     Recent Labs      06/13/18   1504   SODIUM  137   POTASSIUM  3.5*   CHLORIDE  106   CO2  21   BUN  12   CREATININE  0.82   CALCIUM  9.4     Recent Labs      06/13/18   1504   APTT  23.4*   INR  1.05     No results found for this or any previous visit.           Imaging: neuroimaging reviewed and directly visualized by me  DX-CHEST-LIMITED (1 VIEW)   Final Result         No acute cardiac or pulmonary abnormality is identified.      CT-HEAD W/O   Final Result      No evidence of acute intracranial process.          Assessment/Plan:    GTC SEVERE, LASTED <1MIN    SEVERE POST ICTAL CONFUSION/AGITATION     SUSPECT \"TIA\" AT OSH ED WAS POST ICTAL DEFICIT    SZ PRECAUTIONS  ATIVAN 7MG IV GIVEN  KEPPRA 2GM IV LOAD, THEN 750/750 FIRST DOSE NOW  GEODON OR SEROQUEL FOR PSYCHOSIS/AGITATION, AVOID HIGH POTENCY ANTIPSYCHOTICS    CEEG NOW  MR BRAIN SZ PROTOCOL  COMPLETE LABS PENDING, CORRECT DERANGEMENTS  ESR, CRP, D DIMER    Will folllow  Discussed with Mary Mendes M.D.  , Neurohospitalist & Stroke  Clinical Professor, [employer]  Diplomate, Neurology & Neuroimaging    "

## 2018-06-14 NOTE — ED NOTES
After explaining procedure, straight cath performed using sterile technique.    Pt tolerated well. Urine sent to lab.

## 2018-06-14 NOTE — ED NOTES
Assist RN:  Second call placed RN on RICU to notify RN that patient is ready for admit. RN to come and get patient.

## 2018-06-14 NOTE — ED NOTES
Lorazepam 2 mg given over 40 minutes in 0.5 mg increments, patient remnains agitated/restless, somewhat less

## 2018-06-14 NOTE — PROGRESS NOTES
"Pharmacy Kinetics 52 y.o. female on vancomycin day # 1 2018    Indication for Treatment: possible CNS infection    Pertinent history per medical record: Admitted on 2018 for altered level of consciousness and seizure.  Antimicrobials initiated for possible CNS infection by Dr. Mendes.     Other antibiotics: ceftriaxone 2 gm IV q12h, acyclovir IV    Allergies: Penicillins     List concerns for renal function: none at this time    Pertinent cultures to date:   None at this time    Recent Labs      18   1504  18   0548   WBC  10.2  14.5*   NEUTSPOLYS  64.90   --      Recent Labs      18   1504  18   0548   BUN  12  8   CREATININE  0.82  0.75   ALBUMIN  4.2   --      Intake/Output Summary (Last 24 hours) at 18 0949  Last data filed at 18 0800   Gross per 24 hour   Intake             1120 ml   Output                0 ml   Net             1120 ml      Blood pressure (!) 180/85, pulse (!) 109, temperature 37.4 °C (99.3 °F), resp. rate (!) 34, height 1.75 m (5' 8.9\"), weight 83.9 kg (184 lb 15.5 oz), SpO2 95 %. Temp (24hrs), Av.4 °C (99.4 °F), Min:36.9 °C (98.5 °F), Max:38.1 °C (100.6 °F)      A/P   1. Vancomycin dose change: Give 2100 mg IV x 1 then 1700 mg IV q12h  2. Next vancomycin level: tomorrow @ 1100  3. Goal trough: 18-20 mcg/mL  4. Comments:  Will start aggressive dosing for now but will need to monitor renal function closely.    Yenni Resendez, PharmD, BCPS-ID      "

## 2018-06-14 NOTE — ED NOTES
Med w Geodon 20 mg IM, linen changed, siderails padded tppo prevent patient from injuring self from flailing motion

## 2018-06-15 ENCOUNTER — APPOINTMENT (OUTPATIENT)
Dept: RADIOLOGY | Facility: MEDICAL CENTER | Age: 53
DRG: 987 | End: 2018-06-15
Attending: INTERNAL MEDICINE
Payer: COMMERCIAL

## 2018-06-15 ENCOUNTER — APPOINTMENT (OUTPATIENT)
Dept: RADIOLOGY | Facility: MEDICAL CENTER | Age: 53
DRG: 987 | End: 2018-06-15
Attending: PSYCHIATRY & NEUROLOGY
Payer: COMMERCIAL

## 2018-06-15 ENCOUNTER — APPOINTMENT (OUTPATIENT)
Dept: RADIOLOGY | Facility: MEDICAL CENTER | Age: 53
DRG: 987 | End: 2018-06-15
Attending: HOSPITALIST
Payer: COMMERCIAL

## 2018-06-15 PROBLEM — E87.8 ELECTROLYTE ABNORMALITY: Status: ACTIVE | Noted: 2018-06-15

## 2018-06-15 LAB
ACTION RANGE TRIGGERED IACRT: NO
ANION GAP SERPL CALC-SCNC: 9 MMOL/L (ref 0–11.9)
BASE EXCESS BLDA CALC-SCNC: -5 MMOL/L (ref -4–3)
BODY TEMPERATURE: ABNORMAL DEGREES
BUN SERPL-MCNC: 9 MG/DL (ref 8–22)
BURR CELLS/RBC NFR CSF MANUAL: 0 %
CALCIUM SERPL-MCNC: 7.9 MG/DL (ref 8.5–10.5)
CHLORIDE SERPL-SCNC: 109 MMOL/L (ref 96–112)
CLARITY CSF: CLEAR
CO2 BLDA-SCNC: 20 MMOL/L (ref 20–33)
CO2 SERPL-SCNC: 19 MMOL/L (ref 20–33)
COLOR CSF: COLORLESS
COLOR SPUN CSF: COLORLESS
CREAT SERPL-MCNC: 0.64 MG/DL (ref 0.5–1.4)
ERYTHROCYTE [DISTWIDTH] IN BLOOD BY AUTOMATED COUNT: 49.1 FL (ref 35.9–50)
GLUCOSE BLD-MCNC: 117 MG/DL (ref 65–99)
GLUCOSE BLD-MCNC: 80 MG/DL (ref 65–99)
GLUCOSE BLD-MCNC: 84 MG/DL (ref 65–99)
GLUCOSE CSF-MCNC: 68 MG/DL (ref 40–80)
GLUCOSE SERPL-MCNC: 90 MG/DL (ref 65–99)
GRAM STN SPEC: NORMAL
GRAM STN SPEC: NORMAL
HCO3 BLDA-SCNC: 19.3 MMOL/L (ref 17–25)
HCT VFR BLD AUTO: 30.4 % (ref 37–47)
HGB BLD-MCNC: 10.6 G/DL (ref 12–16)
INST. QUALIFIED PATIENT IIQPT: YES
LYMPHOCYTES NFR CSF: 48 %
MAGNESIUM SERPL-MCNC: 1.5 MG/DL (ref 1.5–2.5)
MCH RBC QN AUTO: 31.8 PG (ref 27–33)
MCHC RBC AUTO-ENTMCNC: 34.9 G/DL (ref 33.6–35)
MCV RBC AUTO: 91.3 FL (ref 81.4–97.8)
MONONUC CELLS NFR CSF: 22 %
NEUTROPHILS NFR CSF: 30 %
O2/TOTAL GAS SETTING VFR VENT: 30 %
PCO2 BLDA: 31.3 MMHG (ref 26–37)
PCO2 TEMP ADJ BLDA: 31.6 MMHG (ref 26–37)
PH BLDA: 7.4 [PH] (ref 7.4–7.5)
PH TEMP ADJ BLDA: 7.39 [PH] (ref 7.4–7.5)
PHOSPHATE SERPL-MCNC: 2.4 MG/DL (ref 2.5–4.5)
PLATELET # BLD AUTO: 176 K/UL (ref 164–446)
PMV BLD AUTO: 8.9 FL (ref 9–12.9)
PO2 BLDA: 92 MMHG (ref 64–87)
PO2 TEMP ADJ BLDA: 93 MMHG (ref 64–87)
POTASSIUM SERPL-SCNC: 3.7 MMOL/L (ref 3.6–5.5)
PROT CSF-MCNC: 46 MG/DL (ref 15–45)
RBC # BLD AUTO: 3.33 M/UL (ref 4.2–5.4)
RBC # CSF: 1 CELLS/UL
RHODAMINE-AURAMINE STN SPEC: NORMAL
SAO2 % BLDA: 97 % (ref 93–99)
SIGNIFICANT IND 70042: NORMAL
SITE SITE: NORMAL
SODIUM SERPL-SCNC: 137 MMOL/L (ref 135–145)
SOURCE SOURCE: NORMAL
SPECIMEN DRAWN FROM PATIENT: ABNORMAL
SPECIMEN VOL CSF: 11 ML
TUBE # CSF: 3
TUBE # CSF: 3
WBC # BLD AUTO: 14.9 K/UL (ref 4.8–10.8)
WBC # CSF: 12 CELLS/UL (ref 0–10)

## 2018-06-15 PROCEDURE — 4A10X4Z MONITORING OF CENTRAL NERVOUS ELECTRICAL ACTIVITY, EXTERNAL APPROACH: ICD-10-PCS | Performed by: PSYCHIATRY & NEUROLOGY

## 2018-06-15 PROCEDURE — 700111 HCHG RX REV CODE 636 W/ 250 OVERRIDE (IP): Performed by: INTERNAL MEDICINE

## 2018-06-15 PROCEDURE — 700102 HCHG RX REV CODE 250 W/ 637 OVERRIDE(OP): Performed by: INTERNAL MEDICINE

## 2018-06-15 PROCEDURE — 94003 VENT MGMT INPAT SUBQ DAY: CPT

## 2018-06-15 PROCEDURE — 84157 ASSAY OF PROTEIN OTHER: CPT

## 2018-06-15 PROCEDURE — 770022 HCHG ROOM/CARE - ICU (200)

## 2018-06-15 PROCEDURE — 82803 BLOOD GASES ANY COMBINATION: CPT

## 2018-06-15 PROCEDURE — 700105 HCHG RX REV CODE 258: Performed by: PSYCHIATRY & NEUROLOGY

## 2018-06-15 PROCEDURE — 87529 HSV DNA AMP PROBE: CPT

## 2018-06-15 PROCEDURE — 83735 ASSAY OF MAGNESIUM: CPT

## 2018-06-15 PROCEDURE — 700111 HCHG RX REV CODE 636 W/ 250 OVERRIDE (IP): Performed by: HOSPITALIST

## 2018-06-15 PROCEDURE — 84100 ASSAY OF PHOSPHORUS: CPT

## 2018-06-15 PROCEDURE — 95951 EEG-24 HR: CPT

## 2018-06-15 PROCEDURE — 700105 HCHG RX REV CODE 258: Performed by: HOSPITALIST

## 2018-06-15 PROCEDURE — 62270 DX LMBR SPI PNXR: CPT

## 2018-06-15 PROCEDURE — 71045 X-RAY EXAM CHEST 1 VIEW: CPT

## 2018-06-15 PROCEDURE — 87483 CNS DNA AMP PROBE TYPE 12-25: CPT

## 2018-06-15 PROCEDURE — 700101 HCHG RX REV CODE 250: Performed by: HOSPITALIST

## 2018-06-15 PROCEDURE — 302136 NUTRITION PUMP: Performed by: HOSPITALIST

## 2018-06-15 PROCEDURE — 99223 1ST HOSP IP/OBS HIGH 75: CPT | Performed by: INTERNAL MEDICINE

## 2018-06-15 PROCEDURE — 82945 GLUCOSE OTHER FLUID: CPT

## 2018-06-15 PROCEDURE — 89051 BODY FLUID CELL COUNT: CPT

## 2018-06-15 PROCEDURE — 700111 HCHG RX REV CODE 636 W/ 250 OVERRIDE (IP): Performed by: PSYCHIATRY & NEUROLOGY

## 2018-06-15 PROCEDURE — 87070 CULTURE OTHR SPECIMN AEROBIC: CPT

## 2018-06-15 PROCEDURE — 82962 GLUCOSE BLOOD TEST: CPT | Mod: 91

## 2018-06-15 PROCEDURE — 700101 HCHG RX REV CODE 250: Performed by: INTERNAL MEDICINE

## 2018-06-15 PROCEDURE — 80048 BASIC METABOLIC PNL TOTAL CA: CPT

## 2018-06-15 PROCEDURE — 87205 SMEAR GRAM STAIN: CPT

## 2018-06-15 PROCEDURE — 99233 SBSQ HOSP IP/OBS HIGH 50: CPT | Performed by: HOSPITALIST

## 2018-06-15 PROCEDURE — 009U3ZX DRAINAGE OF SPINAL CANAL, PERCUTANEOUS APPROACH, DIAGNOSTIC: ICD-10-PCS | Performed by: RADIOLOGY

## 2018-06-15 PROCEDURE — 99291 CRITICAL CARE FIRST HOUR: CPT | Performed by: INTERNAL MEDICINE

## 2018-06-15 PROCEDURE — 700111 HCHG RX REV CODE 636 W/ 250 OVERRIDE (IP)

## 2018-06-15 PROCEDURE — 85027 COMPLETE CBC AUTOMATED: CPT

## 2018-06-15 PROCEDURE — A9270 NON-COVERED ITEM OR SERVICE: HCPCS | Performed by: INTERNAL MEDICINE

## 2018-06-15 PROCEDURE — 36600 WITHDRAWAL OF ARTERIAL BLOOD: CPT

## 2018-06-15 RX ORDER — MIDAZOLAM HYDROCHLORIDE 1 MG/ML
INJECTION INTRAMUSCULAR; INTRAVENOUS
Status: COMPLETED
Start: 2018-06-15 | End: 2018-06-15

## 2018-06-15 RX ORDER — ZIPRASIDONE HYDROCHLORIDE 20 MG/1
20 CAPSULE ORAL 2 TIMES DAILY
Status: DISCONTINUED | OUTPATIENT
Start: 2018-06-15 | End: 2018-06-15

## 2018-06-15 RX ORDER — ZIPRASIDONE HYDROCHLORIDE 20 MG/1
20 CAPSULE ORAL 2 TIMES DAILY PRN
Status: DISCONTINUED | OUTPATIENT
Start: 2018-06-15 | End: 2018-06-19

## 2018-06-15 RX ADMIN — DOCUSATE SODIUM AND SENNOSIDES 2 TABLET: 8.6; 5 TABLET, FILM COATED ORAL at 21:24

## 2018-06-15 RX ADMIN — CEFTRIAXONE 2 G: 2 INJECTION, POWDER, FOR SOLUTION INTRAMUSCULAR; INTRAVENOUS at 21:19

## 2018-06-15 RX ADMIN — VANCOMYCIN HYDROCHLORIDE 1700 MG: 100 INJECTION, POWDER, LYOPHILIZED, FOR SOLUTION INTRAVENOUS at 23:31

## 2018-06-15 RX ADMIN — CEFTRIAXONE 2 G: 2 INJECTION, POWDER, FOR SOLUTION INTRAMUSCULAR; INTRAVENOUS at 09:07

## 2018-06-15 RX ADMIN — SODIUM CHLORIDE 1500 MG: 9 INJECTION, SOLUTION INTRAVENOUS at 21:24

## 2018-06-15 RX ADMIN — PROPOFOL 80 MCG/KG/MIN: 10 INJECTION, EMULSION INTRAVENOUS at 09:48

## 2018-06-15 RX ADMIN — FENTANYL CITRATE 50 MCG: 50 INJECTION, SOLUTION INTRAMUSCULAR; INTRAVENOUS at 04:32

## 2018-06-15 RX ADMIN — VANCOMYCIN HYDROCHLORIDE 1700 MG: 100 INJECTION, POWDER, LYOPHILIZED, FOR SOLUTION INTRAVENOUS at 12:18

## 2018-06-15 RX ADMIN — ACYCLOVIR SODIUM 660 MG: 500 INJECTION, SOLUTION INTRAVENOUS at 21:17

## 2018-06-15 RX ADMIN — ACYCLOVIR SODIUM 660 MG: 500 INJECTION, SOLUTION INTRAVENOUS at 14:28

## 2018-06-15 RX ADMIN — VANCOMYCIN HYDROCHLORIDE 1700 MG: 100 INJECTION, POWDER, LYOPHILIZED, FOR SOLUTION INTRAVENOUS at 01:18

## 2018-06-15 RX ADMIN — POTASSIUM CHLORIDE AND SODIUM CHLORIDE: 900; 150 INJECTION, SOLUTION INTRAVENOUS at 04:16

## 2018-06-15 RX ADMIN — PROPOFOL 50 MCG/KG/MIN: 10 INJECTION, EMULSION INTRAVENOUS at 12:44

## 2018-06-15 RX ADMIN — FENTANYL CITRATE 50 MCG: 50 INJECTION, SOLUTION INTRAMUSCULAR; INTRAVENOUS at 06:18

## 2018-06-15 RX ADMIN — FENTANYL CITRATE 50 MCG: 50 INJECTION, SOLUTION INTRAMUSCULAR; INTRAVENOUS at 00:49

## 2018-06-15 RX ADMIN — PROPOFOL 80 MCG/KG/MIN: 10 INJECTION, EMULSION INTRAVENOUS at 16:00

## 2018-06-15 RX ADMIN — POTASSIUM PHOSPHATE, MONOBASIC AND POTASSIUM PHOSPHATE, DIBASIC 15 MMOL: 224; 236 INJECTION, SOLUTION INTRAVENOUS at 09:52

## 2018-06-15 RX ADMIN — PROPOFOL 40 MCG/KG/MIN: 10 INJECTION, EMULSION INTRAVENOUS at 19:07

## 2018-06-15 RX ADMIN — ACYCLOVIR SODIUM 660 MG: 500 INJECTION, SOLUTION INTRAVENOUS at 06:04

## 2018-06-15 RX ADMIN — SODIUM CHLORIDE 1500 MG: 9 INJECTION, SOLUTION INTRAVENOUS at 08:50

## 2018-06-15 RX ADMIN — FENTANYL CITRATE 50 MCG: 50 INJECTION, SOLUTION INTRAMUSCULAR; INTRAVENOUS at 09:53

## 2018-06-15 RX ADMIN — POTASSIUM CHLORIDE AND SODIUM CHLORIDE: 900; 150 INJECTION, SOLUTION INTRAVENOUS at 14:39

## 2018-06-15 NOTE — PROCEDURES
Date of Procedure:  6/14/2018    Title of Procedure:  Diagnostic and therapeutic flexible fiberoptic bronchoscopy with bronchoalveolar lavage    Indication for Procedure:   Atelectasis    Post-procedure Diagnoses:    1.  Normal endobronchial anatomy  2.  No endobronchial tumor identified  3.  Copious amounts of thick, tenacious yellow secretions seen bilaterally, suction until clear.    Narrative:    The patient was sedated, intubated and ventilated at the time of this procedure.  The flexible fiberoptic bronchoscope was inserted through the lumen of the endotracheal tube and advanced into the distal trachea without difficulty.  The airways were examined to the subsegmental bronchus level bilaterally.    The endobronchial anatomy was normal.  No tumor was identified.    There was copious amounts of very thick, tenacious yellow secretions seen bilaterally.  These were suctioned until clear.    Bronchoalveolar lavage was carried out in the basilar segments of the right lower lobe using the standard technique with good fluid return.    Bronchoalveolar lavage fluid from the right lower lobe is submitted to the laboratory for cytology, gram stain, culture and sensitivity, acid fast bacilli smear and culture and fungal culture.    The patient tolerated the procedure quite nicely.  No complications were apparent.  The heart rate and rhythm, blood pressure and oxygenation saturation were continuously monitored.

## 2018-06-15 NOTE — CARE PLAN
Problem: Safety  Goal: Will remain free from injury  Outcome: PROGRESSING AS EXPECTED  Bilateral soft wrist restraints in place with active order. No signs of injury related to restraints. Pt turned and repositioned Q2 hours. ROM performed. Bed locked and in lowest position. Pt educated on safety. No evidence of learning     Problem: Knowledge Deficit  Goal: Knowledge of disease process/condition, treatment plan, diagnostic tests, and medications will improve  Outcome: PROGRESSING SLOWER THAN EXPECTED  Family at bedside educated on medications and POC. All questions answered

## 2018-06-15 NOTE — RESPIRATORY CARE
Cardiopulmonary Resuscitation/Intubation Assist    Intubation assist performed yes   Reason for intubation airway protection   Positive Color Change on EZCap? Yes    Difficult Intubation/Number of attempts no 1   Evidence of aspiration no

## 2018-06-15 NOTE — OR SURGEON
Immediate Post- Operative Note        PostOp Diagnosis: clear CSF      Procedure(s): fluoro guided lumbar puncture      Estimated Blood Loss: Less than 5 ml        Complications: None            6/15/2018     4:43 PM     Alex Roman

## 2018-06-15 NOTE — PROGRESS NOTES
"S- sedated no input.  No other complaints.     O-   Allergies:   Allergies   Allergen Reactions   • Penicillins Unspecified     \"childhood\"  6/14/18: Patient tolerates cephalosporins         Current Facility-Administered Medications:   •  norepinephrine (LEVOPHED) 8 mg in  mL Infusion, 0-30 mcg/min, Intravenous, Continuous, Puneet Woo M.D., Stopped at 06/15/18 0145  •  potassium phosphates 15 mmol in D5W 500 mL ivpb, 15 mmol, Intravenous, Once, Alli Torres M.D., Last Rate: 125 mL/hr at 06/15/18 0952, 15 mmol at 06/15/18 0952  •  acetaminophen (TYLENOL) suppository 650 mg, 650 mg, Rectal, Q6HRS PRN, Puneet Woo M.D., 650 mg at 06/14/18 0122  •  acyclovir (ZOVIRAX) 660 mg in  mL IVPB, 10 mg/kg (Ideal), Intravenous, Q8HRS, Anderson Mendes M.D., Stopped at 06/15/18 0704  •  cefTRIAXone (ROCEPHIN) 2 g in  mL IVPB, 2 g, Intravenous, Q12HRS, Anderson Mendes M.D., Stopped at 06/15/18 0937  •  MD ALERT... vancomycin per pharmacy protocol, , Other, pharmacy to dose, Anderson Mendes M.D.  •  vancomycin 1,700 mg in  mL IVPB, 1,700 mg, Intravenous, Q12HR, Alli Torres M.D., Stopped at 06/15/18 0318  •  levETIRAcetam (KEPPRA) 1,500 mg in  mL IVPB, 1,500 mg, Intravenous, Q12HRS, Anderson Mendes M.D., Stopped at 06/15/18 0905  •  propofol (DIPRIVAN) injection, 0-80 mcg/kg/min, Intravenous, Continuous, Last Rate: 40.3 mL/hr at 06/15/18 0948, 80 mcg/kg/min at 06/15/18 0948 **AND** Triglycerides Starting now and then Every 3 Days, , , Every 3 Days (0300), Puneet Woo M.D.  •  Respiratory Care per Protocol, , Nebulization, Continuous RT, Puneet Woo M.D.  •  senna-docusate (PERICOLACE or SENOKOT S) 8.6-50 MG per tablet 2 Tab, 2 Tab, Oral, BID, Stopped at 06/14/18 2100 **AND** polyethylene glycol/lytes (MIRALAX) PACKET 1 Packet, 1 Packet, Oral, QDAY PRN **AND** magnesium hydroxide (MILK OF MAGNESIA) suspension 30 mL, 30 mL, Oral, QDAY PRN **AND** " bisacodyl (DULCOLAX) suppository 10 mg, 10 mg, Rectal, QDAY PRN, Puneet Woo M.D.  •  MD ALERT...Adult ICU Electrolyte Replacement per Pharmacy Protocol, , Other, pharmacy to dose, Puneet Woo M.D.  •  Action is required: Protocol 751 Tube Feeding Enteral Feeding has been implemented, , , Once **AND** Protocol 751 Document tube feeding in I&O doc flow sheet, , , CONTINUOUS **AND** Protocol 751 elevate HOB, , , CONTINUOUS **AND** Protocol 751 Nutrition (Dietary) Consult, , , Once **AND** [START ON 6/18/2018] Weigh Patient, , , MO & TH **AND** Protocol 751 Tube Placement, , , CONTINUOUS **AND** Protocol 751 Diet Tube Feeding, , , Continuous **AND** Protocol 751 Instructions, , , CONTINUOUS **AND** Protocol 751 Goal Rate, , , CONTINUOUS **AND** Protocol 751 Start Rate, , , CONTINUOUS **AND** Protocol 751 Order to be Separately Placed by RN, , , CONTINUOUS **AND** [START ON 6/18/2018] CRP Quantitative (Non-Cardiac), , , EVERY MONDAY (0300) **AND** [START ON 6/18/2018] Prealbumin, , , EVERY MONDAY (0300) **AND** Pharmacy Consult: Enteral tube feeding - review meds/change route/product selection, 1 Each, Other, PRN **AND** Protocol 751 Nursing Communication Tube Occlusion, , , CONTINUOUS **AND** Protocol 751 Tube Maintenance, , , PRN, Puneet Woo M.D.  •  fentaNYL (SUBLIMAZE) injection 25 mcg, 25 mcg, Intravenous, Q HOUR PRN **OR** fentaNYL (SUBLIMAZE) injection 50 mcg, 50 mcg, Intravenous, Q HOUR PRN, 50 mcg at 06/15/18 0953 **OR** fentaNYL (SUBLIMAZE) injection 100 mcg, 100 mcg, Intravenous, Q HOUR PRN, Puneet Woo M.D.  •  ipratropium-albuterol (DUONEB) nebulizer solution, 3 mL, Nebulization, Q2HRS PRN (RT), Puneet Woo M.D.  •  insulin regular (HUMULIN R) injection 2-9 Units, 2-9 Units, Subcutaneous, Q6HRS, Stopped at 06/14/18 1945 **AND** Accu-Chek Q6 if NPO, , , Q6H **AND** NOTIFY MD and PharmD, , , Once **AND** glucose 4 g chewable tablet 16 g, 16 g, Oral, Q15  MIN PRN **AND** dextrose 50% (D50W) injection 25 mL, 25 mL, Intravenous, Q15 MIN PRN, Puneet Woo M.D.  •  LORazepam (ATIVAN) injection 2-4 mg, 2-4 mg, Intravenous, Q HOUR PRN, Puneet Woo M.D.  •  acetaminophen (TYLENOL) tablet 650 mg, 650 mg, Oral, Q6HRS PRN, Maurilio Schumacher M.D.  •  ondansetron (ZOFRAN) syringe/vial injection 4 mg, 4 mg, Intravenous, Q4HRS PRN, Maurilio Schumacher M.D., 4 mg at 06/13/18 2046  •  ondansetron (ZOFRAN ODT) dispertab 4 mg, 4 mg, Oral, Q4HRS PRN, Maurilio Schumacher M.D.  •  promethazine (PHENERGAN) tablet 12.5-25 mg, 12.5-25 mg, Oral, Q4HRS PRN, Maurilio Schumacher M.D.  •  promethazine (PHENERGAN) suppository 12.5-25 mg, 12.5-25 mg, Rectal, Q4HRS PRN, Maurilio Schumacher M.D.  •  prochlorperazine (COMPAZINE) injection 5-10 mg, 5-10 mg, Intravenous, Q4HRS PRN, Maurilio Schumacher M.D.  •  0.9 % NaCl with KCl 20 mEq infusion, , Intravenous, Continuous, Puneet Woo M.D., Last Rate: 100 mL/hr at 06/15/18 0700      PHYSICAL EXAM    Vitals:    06/15/18 0530 06/15/18 0600 06/15/18 0630 06/15/18 0635   BP:       Pulse: 66 67 69    Resp: 20 20 20    Temp:  37.4 °C (99.4 °F)     SpO2: 100% 100% 99% 100%   Weight:       Height:           Mental Status: sedated  Cranial Nerves:  PERRL 2mm.   No afferent pupillary defect. No nystagmus.                       Motor: none  Sensory: none to pain  Coordination: n/a  DTR's: damp  Gait/Station: n/a     Labs:  Recent Labs      06/13/18   1504  06/14/18   0548  06/15/18   0441   WBC  10.2  14.5*  14.9*   RBC  4.15*  4.00*  3.33*   HEMOGLOBIN  13.4  12.8  10.6*   HEMATOCRIT  38.2  36.5*  30.4*   MCV  92.0  91.3  91.3   MCH  32.3  32.0  31.8   MCHC  35.1*  35.1*  34.9   RDW  50.0  48.5  49.1   PLATELETCT  245  237  176   MPV  8.4*  8.4*  8.9*     Recent Labs      06/13/18   1504  06/14/18   0548  06/15/18   0441   SODIUM  137  135  137   POTASSIUM  3.5*  4.0  3.7   CHLORIDE  106  105  109   CO2  21  21  19*    GLUCOSE  90  118*  90   BUN  12  8  9   CREATININE  0.82  0.75  0.64   CALCIUM  9.4  8.5  7.9*     Recent Labs      06/13/18   1504   APTT  23.4*   INR  1.05         Recent Labs      06/13/18   1504   TROPONINI  <0.01         Recent Labs      06/13/18   1504  06/14/18   0548  06/15/18   0441   SODIUM  137  135  137   POTASSIUM  3.5*  4.0  3.7   CHLORIDE  106  105  109   CO2  21  21  19*   GLUCOSE  90  118*  90   BUN  12  8  9     Recent Labs      06/13/18   1504  06/14/18   0548  06/15/18   0441   SODIUM  137  135  137   POTASSIUM  3.5*  4.0  3.7   CHLORIDE  106  105  109   CO2  21  21  19*   BUN  12  8  9   CREATININE  0.82  0.75  0.64   MAGNESIUM  1.8   --   1.5   PHOSPHORUS   --    --   2.4*   CALCIUM  9.4  8.5  7.9*     Recent Labs      06/13/18   1504   APTT  23.4*   INR  1.05     No results found for this or any previous visit.           Imaging: neuroimaging reviewed and directly visualized by me  MR-BRAIN-WITH & W/O   Final Result      1.  No evidence of acute territorial infarct, intracranial hemorrhage or mass lesion.   2.  Mild microangiopathic ischemic change versus demyelination or gliosis.   3.  Pansinusitis.      DX-CHEST-PORTABLE (1 VIEW)   Final Result         1.  Bibasilar atelectasis, no focal infiltrate is identified      CT-CTA HEAD WITH & W/O-POST PROCESS   Final Result         1.  CT angiogram of the [place] within normal limits.   2.  No dural sinus thrombosis appreciated.   3.  Persistent fetal morphology of the bilateral posterior cerebral arteries.   4.  Severe pansinusitis      DX-CHEST-LIMITED (1 VIEW)   Final Result      1.  Supportive tubing as described above.   2.  No pneumothorax.   3.  Hypoinflation with worsening bibasilar atelectasis.      DX-CHEST-LIMITED (1 VIEW)   Final Result         No acute cardiac or pulmonary abnormality is identified.      CT-HEAD W/O   Final Result      No evidence of acute intracranial process.      DX-LUMBAR PUNCTURE FOR DIAGNOSIS     (Results Pending)       Assessment/Plan:    GTC SEIZURE W/ SEVERE POST ICTAL CONFUSION/AGITATION, AMS PRIOR LIKELY CPS WITH STARING SPELLS     CONCERN FOR ENCEPHALOPATHY OF UNKNOWN ETIOLOGY E.G. ENCEPHALITIS, OR SEIZURES WORSENED DUE TO LOWERED SZ THRESHOLD WITH HALDOL        SZ PRECAUTIONS     KEPPRA 1500/1500     GEODON OR SEROQUEL FOR PSYCHOSIS/AGITATION, AVOID HIGH POTENCY ANTIPSYCHOTICS     CEEG      ENT CONSULT FOR SEVERE SINUSITIS R/O FUNGAL WITH EXPOSURES     LP BY IR TODAY     ID CONSULT FOR CSF LABS (rancher may have atypical exposures)     EMPIRIC ABX/ANTIVIRAL FOR CONCERN FOR ENCEPHALITIS     I personally provided 35 minutes of total critical care time outside of time spent on separately billable/documented procedures. Time includes: review of laboratory data, review of radiology studies, discussion with consultants, discussion with family/patient, monitoring for potential decompensation.  Interventions were performed as documented above.           Anderson Mendes M.D.  , Neurohospitalist & Stroke  Clinical Professor, [employer]  Diplomate, Neurology & Neuroimaging

## 2018-06-15 NOTE — DIETARY
"Nutrition Support Assessment     Nutrition services:   Day 2 of admit.  Malathi York is a 52 y.o. female with admitting DX of seizure.     Current problem list:  1. Acute respiratory failure with hypoxemia  2. Pneumonia due to infectious organism  3. Seizures  4. Atelectasis  5. Leukocytosis  6. Hydradenitis  7. Encephalopathy acute     Assessment:  Estimated Nutritional Needs: based on:   Height: 175 cm (5' 8.9\")  Weight: 83.9 kg (184 lb 15.5 oz)  Weight to Use in Calculations: 83.9 kg (184 lb 15.5 oz)  Ideal Body Weight: 65.8 kg (145 lb)  Percent Ideal Body Weight: 127.6  Body mass index is 27.4 kg/m². (overweight classification)    Calculation/Equation:    REE per MSJ x 1.2 = 1822 kcal/day   PSU = 1829 kcal/day  Total Calories / day: 1800 - 2000 (Calories / k - 24)  Total Grams Protein / day: 101 - 126 (Grams Protein / k.2 - 1.5)     Evaluation:   1. Pt is currently intubated and in need of nutrition support  2. Enteral access is pending, no Cortrak placed at this time  3. No past medical hx on file  4. Having LP done today   5. Phos 2.4 - being replete per MAR  6. Levophed in MAR however not running   7. Propofol @ 50 mcg/kg/min (25.2 ml/hr = 665 kcal/day)  8. Will adjust TF recommendations while on propofol in order to avoid overfeeding     Recommendations/Plan:  1. Once Cortrak placed and position verified, start Peptamen Intense VHP @ 25 ml/hr  2. While on propofol: Advance Peptamen Intense VHP to goal of 50 ml/hr to provide 1200 kcal, 110 gm protein (1.3 gm/kg), and 1008 ml of free water per day  3. When propofol is d/c'd: Transition to Replete Fiber with goal 75 ml/hr to provide 1800 kcal, 115 gm protein (1.4 gm/kg), and 1494 ml of free water per day   4. Fluids per MD  5. Consult for met cart study received - study not indicated at this time, RD to order prn  6. RD to monitor pressor and propofol rates    RD following                 "

## 2018-06-15 NOTE — PROGRESS NOTES
Pulmonary Critical Care Progress Note        Admit: 6/13/2018  Date of Service:  6/15/2018  Chief Complaint: confusion and difficulty with ambulation    History of Present Illness: 52 y.o. female with a benign past medical history was admitted for acute unspecified encephalopathy from unknown cause, but had a witnessed generalized tonic-clonic seizure in  ER at [facility] who was admitted to the ICU for critical care management and frequent neurological checks.    Review of Systems   Unable to perform ROS: Intubated     Interval Events:  24 hour interval history reviewed    - became more restless today and not controlled on precedex   - intubated last night   - MRI and CTA done last night   - very agitated and writhing in bed while trying to prep her for continuous EEG   - SR 50-80s   - SBP 90-110s   - Prop 15-20   - Tmax 99.4   - BM prior to admit   - UOP of 550 cc overnight with rucker   - vent day #2   - no SBTs   - CXR(reviewed): clear lung fields   - lovenox/pepcid   - day #2 for vanco/C3/acyclovir    Yesterday's Events:   - very restless overnight and not following or opening eyes   - received Ativan 7mg, Geodon, and KEatmine for CT head   - SR 80-100s   - -170s   - Tmax 100.1   - EEG last night   - incontinence of urine   - neurology following: placed new orders MRV/MRA, LP   - started rocephin, vanco, and zovirax   - no RT issues   - CXR(reviewed from 6/13): clear lung fields      PFSH:  No change.    Physical Exam   Constitutional: She appears well-developed and well-nourished. No distress.   HENT:   Right Ear: External ear normal.   Left Ear: External ear normal.   Nose: Nose normal.   Eyes: Conjunctivae and EOM are normal. Pupils are equal, round, and reactive to light. No scleral icterus.   Neck: Normal range of motion. Neck supple. No thyromegaly present.   Cardiovascular: Normal rate, regular rhythm, normal heart sounds and intact distal pulses.    No murmur heard.  Pulmonary/Chest: Breath sounds  normal. No respiratory distress. She exhibits no tenderness.   Breathing comfortably on the vent     Abdominal: Soft. Bowel sounds are normal. She exhibits no distension and no mass. There is no tenderness.   Musculoskeletal: Normal range of motion. She exhibits no edema or tenderness.   Lymphadenopathy:     She has no cervical adenopathy.   Neurological: She displays normal reflexes. No cranial nerve deficit or sensory deficit. Coordination normal.   Sedated and intubated, but still very agitated/writhing in bed at times, not following any commands   Skin: Skin is warm and dry. Capillary refill takes less than 2 seconds. No rash noted. She is not diaphoretic.   Psychiatric:   Unable to assess   Nursing note and vitals reviewed.      Respiratory:  Car Vent Mode: APVCMV, Rate (breaths/min): 20, Vt Target (mL): 440, PEEP/CPAP: 8, FiO2: 40  Pulse Oximetry: 100 %    HemoDynamics:  Pulse: 64, Heart Rate (Monitored): 64  NIBP: 110/55  CVP (mm Hg): (!) 14 MM HG      Recent Labs      06/13/18   1504   TROPONINI  <0.01       Neuro:  GCS Total [place] Coma Score: 7    CT head (reviewed):  FINDINGS: There is no evidence of mass or mass effect. CSF spaces are normal. There is no periventricular chronic small vessel ischemic change present. There is no intracranial hemorrhage seen. The calvarium is intact. There is no scalp injury. There is bilateral maxillary, ethmoid, frontal and sphenoid sinus disease.    MRI:  1.  No evidence of acute territorial infarct, intracranial hemorrhage or mass lesion.  2.  Mild microangiopathic ischemic change versus demyelination or gliosis.  3.  Pansinusitis.    CTA head:  1.  CT angiogram of the [place] within normal limits.  2.  No dural sinus thrombosis appreciated.  3.  Persistent fetal morphology of the bilateral posterior cerebral arteries.  4.  Severe pansinusitis  Fluids:  Intake/Output       06/13/18 0700 - 06/14/18 0659 06/14/18 0700 - 06/15/18 0659 06/15/18 0700 -  18 0659       Total  Total  Total       Intake    I.V.  --  800 800  2098.8  3050.5 5149.3  --  -- --    Precedex Volume -- -- -- 8.8 57.2 66.1 -- -- --    Propofol Volume -- -- -- -- 43.2 43.2 -- -- --    IV Piggyback Volume (IV Piggyback) -- -- -- 8736 501 6367 -- -- --    IV Volume (20k 0.9Ns) --  1000 2000 -- -- --    IV Volume (LR) -- -- -- -- 1000 1000 -- -- --    Total Intake -- .8 3050.5 5149.3 -- -- --       Output    Urine  --  0 0  --  495 495  --  -- --    Number of Times Incontinent of Urine -- 4 x 4 x 6 x -- 6 x -- -- --    Urine Void (mL) (non-catheter) -- 0 0 -- -- -- -- -- --    Output (mL) (Urinary Catheter Indwelling Catheter 16) -- -- -- -- 495 495 -- -- --    Stool  --  -- --  --  -- --  --  -- --    Number of Times Stooled -- 0 x 0 x -- 0 x 0 x -- -- --    Total Output -- 0 0 -- 495 495 -- -- --       Net I/O     -- .8 2555.5 4654.3 -- -- --           Recent Labs      18   1504  18   0548  06/15/18   0441   SODIUM  137  135  137   POTASSIUM  3.5*  4.0  3.7   CHLORIDE  106  105  109   CO2  21  21  19*   BUN  12  8  9   CREATININE  0.82  0.75  0.64   MAGNESIUM  1.8   --   1.5   PHOSPHORUS   --    --   2.4*   CALCIUM  9.4  8.5  7.9*       GI/Nutrition:    Liver Function  Recent Labs      18   1504  18   0548  06/15/18   0441   ALTSGPT  27   --    --    ASTSGOT  22   --    --    ALKPHOSPHAT  42   --    --    TBILIRUBIN  0.6   --    --    GLUCOSE  90  118*  90       Heme:  Recent Labs      18   1504  18   0548  06/15/18   0441   RBC  4.15*  4.00*  3.33*   HEMOGLOBIN  13.4  12.8  10.6*   HEMATOCRIT  38.2  36.5*  30.4*   PLATELETCT  245  237  176   PROTHROMBTM  13.4   --    --    APTT  23.4*   --    --    INR  1.05   --    --        Infectious Disease:  Temp  Av.2 °C (99 °F)  Min: 36.4 °C (97.6 °F)  Max: 37.8 °C (100 °F)  Micro: reviewed  Recent Labs       06/13/18   1504  06/14/18   0548  06/15/18   0441   WBC  10.2  14.5*  14.9*   NEUTSPOLYS  64.90   --    --    LYMPHOCYTES  27.30   --    --    MONOCYTES  5.70   --    --    EOSINOPHILS  1.10   --    --    BASOPHILS  0.60   --    --    ASTSGOT  22   --    --    ALTSGPT  27   --    --    ALKPHOSPHAT  42   --    --    TBILIRUBIN  0.6   --    --      Current Facility-Administered Medications   Medication Dose Frequency Provider Last Rate Last Dose   • norepinephrine (LEVOPHED) 8 mg in  mL Infusion  0-30 mcg/min Continuous Puneet Woo M.D.   Stopped at 06/15/18 0145   • acetaminophen (TYLENOL) suppository 650 mg  650 mg Q6HRS PRN Puneet Woo M.D.   650 mg at 06/14/18 0122   • acyclovir (ZOVIRAX) 660 mg in  mL IVPB  10 mg/kg (Ideal) Q8HRS Anderson Mendes M.D.   Stopped at 06/15/18 0046   • cefTRIAXone (ROCEPHIN) 2 g in  mL IVPB  2 g Q12HRS Anderson Mendes M.D.   Stopped at 06/14/18 2308   • MD ALERT... vancomycin per pharmacy protocol   pharmacy to dose Anderson Mendes M.D.       • vancomycin 1,700 mg in  mL IVPB  1,700 mg Q12HR Alli Torres M.D.   Stopped at 06/15/18 0318   • levETIRAcetam (KEPPRA) 1,500 mg in  mL IVPB  1,500 mg Q12HRS Anderson Mendes M.D.   Stopped at 06/14/18 2253   • dexmedetomidine (PRECEDEX) 400 mcg in D5W 100 mL infusion  0.1-1.5 mcg/kg/hr Continuous Alli Torres M.D.   Stopped at 06/14/18 1949   • propofol (DIPRIVAN) injection  0-80 mcg/kg/min Continuous Puneet Woo M.D. 5 mL/hr at 06/15/18 0422 10 mcg/kg/min at 06/15/18 0422   • Respiratory Care per Protocol   Continuous RT Puneet Woo M.D.       • senna-docusate (PERICOLACE or SENOKOT S) 8.6-50 MG per tablet 2 Tab  2 Tab BID Puneet Woo M.D.   Stopped at 06/14/18 2100    And   • polyethylene glycol/lytes (MIRALAX) PACKET 1 Packet  1 Packet QDAY PRN Puneet Woo M.D.        And   • magnesium hydroxide (MILK OF MAGNESIA) suspension 30 mL  30  mL QDAY PRN Puneet Woo M.D.        And   • bisacodyl (DULCOLAX) suppository 10 mg  10 mg QDAY PRN Puneet Woo M.D.       • MD ALERT...Adult ICU Electrolyte Replacement per Pharmacy Protocol   pharmacy to dose Puneet Woo M.D.       • Pharmacy Consult: Enteral tube feeding - review meds/change route/product selection  1 Each PRN Puneet Woo M.D.       • enoxaparin (LOVENOX) inj 40 mg  40 mg DAILY Puneet Woo M.D.   40 mg at 06/14/18 2236   • fentaNYL (SUBLIMAZE) injection 25 mcg  25 mcg Q HOUR PRN Puneet Woo M.D.        Or   • fentaNYL (SUBLIMAZE) injection 50 mcg  50 mcg Q HOUR PRN Puneet Woo M.D.   50 mcg at 06/15/18 0432    Or   • fentaNYL (SUBLIMAZE) injection 100 mcg  100 mcg Q HOUR PRN Puneet Woo M.D.       • ipratropium-albuterol (DUONEB) nebulizer solution  3 mL Q2HRS PRN (RT) Puneet Woo M.D.       • insulin regular (HUMULIN R) injection 2-9 Units  2-9 Units Q6HRS Puneet Woo M.D.   Stopped at 06/14/18 1945    And   • glucose 4 g chewable tablet 16 g  16 g Q15 MIN PRN Puneet Woo M.D.        And   • dextrose 50% (D50W) injection 25 mL  25 mL Q15 MIN PRN Puneet Woo M.D.       • LORazepam (ATIVAN) injection 2-4 mg  2-4 mg Q HOUR PRN Puneet Woo M.D.       • acetaminophen (TYLENOL) tablet 650 mg  650 mg Q6HRS PRN Maurilio Schumacher M.D.       • ondansetron (ZOFRAN) syringe/vial injection 4 mg  4 mg Q4HRS PRN Maurilio Schumacher M.D.   4 mg at 06/13/18 2046   • ondansetron (ZOFRAN ODT) dispertab 4 mg  4 mg Q4HRS PRN Maurilio Schumacher M.D.       • promethazine (PHENERGAN) tablet 12.5-25 mg  12.5-25 mg Q4HRS PRN Maurilio Schumacher M.D.       • promethazine (PHENERGAN) suppository 12.5-25 mg  12.5-25 mg Q4HRS PRN Maurilio Schumacher M.D.       • prochlorperazine (COMPAZINE) injection 5-10 mg  5-10 mg Q4HRS PRN Maurilio Schumacher M.D.       • 0.9 % NaCl  with KCl 20 mEq infusion   Continuous Puneet Woo M.D. 100 mL/hr at 06/15/18 0416       Last reviewed on 6/13/2018  4:43 PM by Mila Tovar    Quality  Measures:  Radiology images reviewed, Labs reviewed, Medications reviewed and EKG reviewed  Sam catheter: Critically Ill - Requiring Accurate Measurement of Urinary Output        DVT prophylaxis - mechanical: SCDs  Ulcer prophylaxis: Not indicated          Problems/Plan:    Acute Hypoxic Respiratory Failure   - due to airway protection and to obtain diagnostic studies   - intubated on 6/14   - cont RT/O2 protocols   - cont full vent support   - cont vent bundle protocols   - adjust vent settings based on ABGs/CXRs   - no SBTs today  Acute Unspecified Encephalopathy   - CT head negative   - UDS negative as well as metabolic panel   - ? Related to seizures   - ? Viral etiology   - q 2 hr neuro checks   - neurology following   - cont ceftriaxone, acyclovir, and vancomycin   - s/p decadron   - CTA head negative   - MRI head negative   - getting LP today   - ID consulting   - started Geodon  Acute Generalized Tonic-Clonic Seizure   - neurology following   - s/p Keppra 2g load and will continue Keppra 750mg IV BID   - cont seizure precautions   - continuous EEG   - getting LP today   - negative MRI brain  Pansinusitis   - noted on CT head and MRI brain   - cont ceftriaxone  Hypokalemia   - repleted  Hypomagnesemia   - repleting  Acute Leukocytosis   - ?reactive vs infectious   - cont to follow and follow fever curve   - decadron started prior to starting ceftriaxone, vanco, and acyclovir  Prophylaxis   - SCDs    Pt's prognosis is guarded.  She is critically ill with an unstable pulmonary and neuro process on full vent support still awaiting LP.  The patient is at high risk of clinical deterioration, worsening vital organ dysfunction, and death without the above critical care interventions.    Discussed patient condition and risk of morbidity and/or  mortality with Family, RN, RT, Therapies, Pharmacy, Dietary, , Charge nurse / hot rounds, Patient and hospitalist.    Critical care time = 37 minutes

## 2018-06-15 NOTE — CONSULTS
"ADULT INFECTIOUS DISEASE CONSULT     Date of Service: 6/15/2018    Consult Requested By: Alli Torres M.D.    Reason for Consultation: Encephalitis    History of Present Illness:   Malathi York is a 52 y.o. female with no significant past medical history. Recently apparently had a wedding for her son. She was found to be altered on waking up on 6/13/2018 hence was brought to the outside facility. She was transferred to [facility] for possibility of stroke. The ER she had a seizure. Since then she has required intubation. Low-grade temperatures up to 100.1 noted. CT scan and MRI showed severe pansinusitis. Patient continues to remain ventilated. Infectious disease was called for infectious causes of her altered mental status.    Review Of Systems:  Cannot be obtained since patient is intubated      PMH:   History reviewed. No pertinent past medical history.      PSH:  History reviewed. No pertinent surgical history.    FAMILY HX:  Family History   Problem Relation Age of Onset   • Seizures Father        SOCIAL HX:  Social History     Social History   • Marital status:      Spouse name: N/A   • Number of children: N/A   • Years of education: N/A     Occupational History   • Not on file.     Social History Main Topics   • Smoking status: Not on file   • Smokeless tobacco: Not on file   • Alcohol use Not on file   • Drug use: Unknown   • Sexual activity: Not on file     Other Topics Concern   • Not on file     Social History Narrative   • No narrative on file     History   Smoking Status   • Not on file   Smokeless Tobacco   • Not on file     History   Alcohol use Not on file       Allergies/Intolerances:  Allergies   Allergen Reactions   • Penicillins Unspecified     \"childhood\"  6/14/18: Patient tolerates cephalosporins       History reviewed with the patient    Other Current Medications:    Current Facility-Administered Medications:   •  norepinephrine (LEVOPHED) 8 mg in  mL Infusion, 0-30 " mcg/min, Intravenous, Continuous, Puneet Woo M.D., Stopped at 06/15/18 0145  •  ziprasidone (GEODON) capsule 20 mg, 20 mg, Oral, BID PRN, Anderson Mendes M.D.  •  FENTANYL CITRATE (PF) 0.05 MG/ML INJ SOLN, , , ,   •  MIDAZOLAM HCL 2 MG/2ML INJ SOLN, , , ,   •  acetaminophen (TYLENOL) suppository 650 mg, 650 mg, Rectal, Q6HRS PRN, Puneet Woo M.D., 650 mg at 06/14/18 0122  •  acyclovir (ZOVIRAX) 660 mg in  mL IVPB, 10 mg/kg (Ideal), Intravenous, Q8HRS, Anderson Mendes M.D., Last Rate: 250 mL/hr at 06/15/18 1428, 660 mg at 06/15/18 1428  •  cefTRIAXone (ROCEPHIN) 2 g in  mL IVPB, 2 g, Intravenous, Q12HRS, Anderson Mendes M.D., Stopped at 06/15/18 0937  •  MD ALERT... vancomycin per pharmacy protocol, , Other, pharmacy to dose, Anderson Mendes M.D.  •  vancomycin 1,700 mg in  mL IVPB, 1,700 mg, Intravenous, Q12HR, Alli Torres M.D., Stopped at 06/15/18 1418  •  levETIRAcetam (KEPPRA) 1,500 mg in  mL IVPB, 1,500 mg, Intravenous, Q12HRS, Anderson Mendes M.D., Stopped at 06/15/18 0905  •  propofol (DIPRIVAN) injection, 0-80 mcg/kg/min, Intravenous, Continuous, Last Rate: 25.2 mL/hr at 06/15/18 1244, 50 mcg/kg/min at 06/15/18 1244 **AND** Triglycerides Starting now and then Every 3 Days, , , Every 3 Days (0300), Puneet Woo M.D.  •  Respiratory Care per Protocol, , Nebulization, Continuous RT, Puneet Woo M.D.  •  senna-docusate (PERICOLACE or SENOKOT S) 8.6-50 MG per tablet 2 Tab, 2 Tab, Oral, BID, Stopped at 06/14/18 2100 **AND** polyethylene glycol/lytes (MIRALAX) PACKET 1 Packet, 1 Packet, Oral, QDAY PRN **AND** magnesium hydroxide (MILK OF MAGNESIA) suspension 30 mL, 30 mL, Oral, QDAY PRN **AND** bisacodyl (DULCOLAX) suppository 10 mg, 10 mg, Rectal, QDAY PRN, Puneet Woo M.D.  •  MD ALERT...Adult ICU Electrolyte Replacement per Pharmacy Protocol, , Other, pharmacy to dose, Puneet Woo M.D.  •  Action is required: Protocol  751 Tube Feeding Enteral Feeding has been implemented, , , Once **AND** Protocol 751 Document tube feeding in I&O doc flow sheet, , , CONTINUOUS **AND** Protocol 751 elevate HOB, , , CONTINUOUS **AND** Protocol 751 Nutrition (Dietary) Consult, , , Once **AND** [START ON 6/18/2018] Weigh Patient, , , MO & TH **AND** Protocol 751 Tube Placement, , , CONTINUOUS **AND** [CANCELED] Protocol 751 Diet Tube Feeding, , , Continuous **AND** Protocol 751 Instructions, , , CONTINUOUS **AND** Protocol 751 Goal Rate, , , CONTINUOUS **AND** Protocol 751 Start Rate, , , CONTINUOUS **AND** Protocol 751 Order to be Separately Placed by RN, , , CONTINUOUS **AND** [START ON 6/18/2018] CRP Quantitative (Non-Cardiac), , , EVERY MONDAY (0300) **AND** [START ON 6/18/2018] Prealbumin, , , EVERY MONDAY (0300) **AND** Pharmacy Consult: Enteral tube feeding - review meds/change route/product selection, 1 Each, Other, PRN **AND** Protocol 751 Nursing Communication Tube Occlusion, , , CONTINUOUS **AND** Protocol 751 Tube Maintenance, , , PRN, Puneet Woo M.D.  •  fentaNYL (SUBLIMAZE) injection 25 mcg, 25 mcg, Intravenous, Q HOUR PRN **OR** fentaNYL (SUBLIMAZE) injection 50 mcg, 50 mcg, Intravenous, Q HOUR PRN, 50 mcg at 06/15/18 0953 **OR** fentaNYL (SUBLIMAZE) injection 100 mcg, 100 mcg, Intravenous, Q HOUR PRN, Puneet Woo M.D.  •  ipratropium-albuterol (DUONEB) nebulizer solution, 3 mL, Nebulization, Q2HRS PRN (RT), Puneet Woo M.D.  •  insulin regular (HUMULIN R) injection 2-9 Units, 2-9 Units, Subcutaneous, Q6HRS, Stopped at 06/14/18 1945 **AND** Accu-Chek Q6 if NPO, , , Q6H **AND** NOTIFY MD and PharmD, , , Once **AND** glucose 4 g chewable tablet 16 g, 16 g, Oral, Q15 MIN PRN **AND** dextrose 50% (D50W) injection 25 mL, 25 mL, Intravenous, Q15 MIN PRN, Puneet Woo M.D.  •  LORazepam (ATIVAN) injection 2-4 mg, 2-4 mg, Intravenous, Q HOUR PRN, Puneet Woo M.D.  •  acetaminophen  "(TYLENOL) tablet 650 mg, 650 mg, Oral, Q6HRS PRN, Maurilio Schumacher M.D.  •  ondansetron (ZOFRAN) syringe/vial injection 4 mg, 4 mg, Intravenous, Q4HRS PRN, Maurilio Schumacher M.D., 4 mg at 18  •  ondansetron (ZOFRAN ODT) dispertab 4 mg, 4 mg, Oral, Q4HRS PRN, Maurilio Schumacher M.D.  •  promethazine (PHENERGAN) tablet 12.5-25 mg, 12.5-25 mg, Oral, Q4HRS PRN, Maurilio Schumacher M.D.  •  promethazine (PHENERGAN) suppository 12.5-25 mg, 12.5-25 mg, Rectal, Q4HRS PRN, Maurilio Schumacher M.D.  •  prochlorperazine (COMPAZINE) injection 5-10 mg, 5-10 mg, Intravenous, Q4HRS PRN, Maurilio Schumacher M.D.  •  0.9 % NaCl with KCl 20 mEq infusion, , Intravenous, Continuous, Puneet Woo M.D., Last Rate: 100 mL/hr at 06/15/18 0700  [unfilled]    Most Recent Vital Signs:  BP (!) 180/85   Pulse 62   Temp 36.3 °C (97.3 °F)   Resp 20   Ht 1.75 m (5' 8.9\")   Wt 83.9 kg (184 lb 15.5 oz)   SpO2 100%   BMI 27.40 kg/m²   Temp  Av.2 °C (99 °F)  Min: 36.3 °C (97.3 °F)  Max: 38.1 °C (100.6 °F)    Physical Exam:  General: Intubated sedated  HEENT: sclera anicteric, PERRL, EOMI, MMM, no oral lesions  Neck: supple, no lymphadenopathy  Chest: CTAB, no r/r/w, normal work of breathing.  Cardiac: Regular, no murmurs no gallops heard  Abdomen: + bowel sounds, soft, non-tender, non-distended, no HSM  Extremities: No edema. No joint swelling.  Skin: no rashes or erythema  Neuro: Sedated    Pertinent Lab Results:  Recent Labs      18   1504  18   0548  06/15/18   0441   WBC  10.2  14.5*  14.9*      Recent Labs      18   1504  18   0548  06/15/18   0441   HEMOGLOBIN  13.4  12.8  10.6*   HEMATOCRIT  38.2  36.5*  30.4*   MCV  92.0  91.3  91.3   MCH  32.3  32.0  31.8   PLATELETCT  245  237  176         Recent Labs      18   1504  18   0548  06/15/18   0441   SODIUM  137  135  137   POTASSIUM  3.5*  4.0  3.7   CHLORIDE  106  105  109   CO2  21  21  19* " "  CREATININE  0.82  0.75  0.64        Recent Labs      06/13/18   1504   ALBUMIN  4.2        Pertinent Micro:  Results     Procedure Component Value Units Date/Time    QUANT BRONCHIAL WASHING [[id number]] Collected:  06/14/18 1845    Order Status:  Completed Specimen:  Respirate Updated:  06/15/18 1353     Significant Indicator NEG     Source RESP     Site Quantitative BAL RLL     Quantitative Bronch Washing Culture in progress.     Gram Stain Result Moderate WBCs.  Moderate mixed bacteria, no predominant organism seen.  <5% intracellular organisms.      FUNGAL CULTURE [[id number]] Collected:  06/14/18 1845    Order Status:  Completed Specimen:  Respirate Updated:  06/15/18 1353     Significant Indicator NEG     Source RESP     Site Quantitative BAL RLL     Fungal Culture Culture in progress.    BLOOD CULTURE [[id number]] Collected:  06/14/18 0122    Order Status:  Completed Specimen:  Blood from Peripheral Updated:  06/15/18 0753     Significant Indicator NEG     Source BLD     Site PERIPHERAL     Blood Culture No Growth    Note: Blood cultures are incubated for 5 days and  are monitored continuously.Positive blood cultures  are called to the RN and reported as soon as  they are identified.      Narrative:       Collected By[facility][id number] [facility]  Per Hospital Policy: Only change Specimen Src: to \"Line\" if  specified by physician order.    BLOOD CULTURE [[id number]] Collected:  06/14/18 0122    Order Status:  Completed Specimen:  Blood from Peripheral Updated:  06/15/18 0753     Significant Indicator NEG     Source BLD     Site PERIPHERAL     Blood Culture No Growth    Note: Blood cultures are incubated for 5 days and  are monitored continuously.Positive blood cultures  are called to the RN and reported as soon as  they are identified.      Narrative:       Col[facility][id number] [facility]  Per Hospital Policy: Only change Specimen Src: to \"Line\" if  specified by physician order.    GRAM STAIN [[id number]] " Collected:  06/14/18 1845    Order Status:  Completed Specimen:  Respirate Updated:  06/15/18 0748     Significant Indicator .     Source RESP     Site Quantitative BAL RLL     Gram Stain Result Moderate WBCs.  Moderate mixed bacteria, no predominant organism seen.  <5% intracellular organisms.      AFB CULTURE [[id number]] Collected:  06/14/18 1845    Order Status:  Completed Specimen:  Other Updated:  06/14/18 2002    CSF CULTURE [[id number]]     Order Status:  No result Specimen:  CSF from Tap     HSV 1/2 BY PCR(HERPES) [[id number]]     Order Status:  No result Specimen:  CSF from Spinal Fluid     URINALYSIS [[id number]]  (Abnormal) Collected:  06/13/18 1953    Order Status:  Completed Specimen:  Urine Updated:  06/13/18 2042     Color Yellow     Character Clear     Specific Gravity 1.022     Ph 5.0     Glucose Negative mg/dL      Ketones 40 (A) mg/dL      Protein Negative mg/dL      Bilirubin Negative     Urobilinogen, Urine 0.2     Nitrite Negative     Leukocyte Esterase Negative     Occult Blood Negative     Micro Urine Req see below     Comment: Microscopic examination not performed when specimen is clear  and chemically negative for protein, blood, leukocyte esterase  and nitrite.         Culture Respiratory without Gram Stain [[id number]]     Order Status:  Completed Specimen:  Respirate from Sputum     URINALYSIS CULTURE, IF INDICATED [[id number]]     Order Status:  Sent Specimen:  Urine         Blood Culture   Date Value Ref Range Status   06/14/2018   Preliminary    No Growth    Note: Blood cultures are incubated for 5 days and  are monitored continuously.Positive blood cultures  are called to the RN and reported as soon as  they are identified.     06/14/2018   Preliminary    No Growth    Note: Blood cultures are incubated for 5 days and  are monitored continuously.Positive blood cultures  are called to the RN and reported as soon as  they are identified.          Studies:  Ct-cta Head With & W/o-post  Process    Result Date: 6/14/2018 6/14/2018 9:13 PM HISTORY/REASON FOR EXAM:  Altered Mental Status TECHNIQUE/EXAM DESCRIPTION: CT angiogram of the [place] without and with contrast.  Initial precontrast images were obtained of the head from the skull base through the vertex.  Postcontrast images were obtained of the [place] following the power injection of nonionic contrast at 5.0 mL/sec. CT venogram images were then obtained. Thin-section helical images were obtained with overlapping reconstruction interval. Coronal and sagittal multiplanar volume reformats were generated.  3D angiographic images were reviewed on PACS.  Maximum intensity projection (MIP) images were generated and reviewed. 100 mL of Omnipaque 350 nonionic contrast was injected intravenously. Low dose optimization technique was utilized for this CT exam including automated exposure control and adjustment of the mA and/or kV according to patient size. COMPARISON:  June 13, 2018. FINDINGS: The posterior circulation shows the distal vertebral arteries to be patent. The vertebrobasilar confluence is intact. The basilar artery is patent. No aneurysm or occlusive lesion is evident. There is persistent fetal morphology of the bilateral posterior cerebral arteries with contribution from the basilar artery. The anterior circulation shows no stenotic or occlusive lesion. No aneurysm is evident about the [place]. The dural sinuses are well opacified without apparent thrombus filling defect. The brain is unremarkable. There is extensive opacification of the bilateral paranasal sinuses.     1.  CT angiogram of the [place] within normal limits. 2.  No dural sinus thrombosis appreciated. 3.  Persistent fetal morphology of the bilateral posterior cerebral arteries. 4.  Severe pansinusitis    Ct-head W/o    Result Date: 6/13/2018 6/13/2018 3:51 PM HISTORY/REASON FOR EXAM: Active seizure. TECHNIQUE/EXAM DESCRIPTION AND NUMBER OF  VIEWS: CT of the head without contrast. Up to date radiation dose reduction adjustments have been utilized to meet ALARA standards for radiation dose reduction. COMPARISON: None available FINDINGS: There is no evidence of mass or mass effect. CSF spaces are normal. There is no periventricular chronic small vessel ischemic change present. There is no intracranial hemorrhage seen. The calvarium is intact. There is no scalp injury. There is bilateral maxillary, ethmoid, frontal and sphenoid sinus disease.     No evidence of acute intracranial process.    Dx-chest-limited (1 View)    Result Date: 6/14/2018 6/14/2018 7:32 PM HISTORY/REASON FOR EXAM:  Line placement, tube placement TECHNIQUE/EXAM DESCRIPTION AND NUMBER OF VIEWS: Single portable view of the chest. COMPARISON: 6/13/2018 FINDINGS: Cardiac mediastinal contour is unchanged. Lungs show hypoinflation. Linear opacities in the LEFT mid and RIGHT lower lung regions. RIGHT internal jugular catheter with tip at the lower SVC, approximately 2 cm above the cavoatrial junction. Endotracheal tube in place with tip approximately 5 cm above the yrn. No pneumothorax. No major bony abnormality is seen.     1.  Supportive tubing as described above. 2.  No pneumothorax. 3.  Hypoinflation with worsening bibasilar atelectasis.    Dx-chest-limited (1 View)    Result Date: 6/13/2018 6/13/2018 4:14 PM HISTORY/REASON FOR EXAM:  Increased shortness of breath TECHNIQUE/EXAM DESCRIPTION AND NUMBER OF VIEWS: Single portable view of the chest. COMPARISON: None FINDINGS: Heart size is within normal limits. No focal infiltrates or consolidations are identified in the lungs. No pleural fluid collections are identified. No pneumothorax is appreciated.     No acute cardiac or pulmonary abnormality is identified.    Dx-chest-portable (1 View)    Result Date: 6/15/2018    6/15/2018 2:09 AM HISTORY/REASON FOR EXAM: For indication of respiratory failure TECHNIQUE/EXAM DESCRIPTION:  Single AP  view of the chest. COMPARISON: Yesterday FINDINGS: Position of medical devices appears stable. The cardiac silhouette appears within normal limits. The mediastinal contour appears within normal limits.  The central pulmonary vasculature appears normal. The lungs appear well expanded bilaterally.  Hazy linear density in the bilateral lung bases is observed. No significant pleural effusions are identified. The bony structures appear age-appropriate.     1.  Bibasilar atelectasis, no focal infiltrate is identified    Mr-brain-with & W/o    Result Date: 6/15/2018  6/14/2018 9:07 PM HISTORY/REASON FOR EXAM:  Seizure. TECHNIQUE/EXAM DESCRIPTION: MRI of the brain and temporal lobes without and with contrast (seizure protocol). The study was performed on a Inductly Signa 1.5 Jenna MRI scanner. Spoiled-GRASS sagittal, thin-section T2 axial, T1 coronal, T1 postcontrast coronal, T1 postcontrast axial, and FLAIR coronal images were obtained of the whole brain. Additional thin-section T2 fast spin-echo oblique images were also obtained to display the temporal lobes and hippocampal formations. 18 mL Omniscan contrast were administered intravenously. COMPARISON:  None. FINDINGS: The cortical sulci and gyri are within normal limits. The ventricular system is within normal limits. The bony calvaria are intact. There is no evidence of extra-axial fluid collection or intracranial mass effect. There is a pattern of mild supratentorial white matter disease with scattered foci of bright T2 and FLAIR signal in the subcortical and deep white matter of both hemispheres consistent with small vessel ischemic change versus demyelination or gliosis. There is no evidence of abnormal diffusion-weighted signal intensity in the brain. There is no evidence of abnormal enhancement in the brain parenchyma. There are normal flow voids in the cavernous carotid arteries and M1 segments. There are normal flow voids in the distal vertebral basilar system.  There are normal flow voids in the dural venous sinuses. There is diffuse paranasal sinus mucosal thickening. Mastoid air cells are well aerated.     1.  No evidence of acute territorial infarct, intracranial hemorrhage or mass lesion. 2.  Mild microangiopathic ischemic change versus demyelination or gliosis. 3.  Pansinusitis.  IMPRESSION:   Encephalitis  Pansinusitis  Seizures  Respiratory failure        PLAN:   Malathi York is a 52 y.o. female with no significant past medical history admitted with encephalitis and seizures. Check a lumbar puncture. Check the CSF for viral panel. Continue broad-spectrum antibiotics for now. Check serum West Nile antibody.  I have reviewed all the records      Discussed with IM. Will continue to follow    Naina Sommer M.D.

## 2018-06-15 NOTE — PROGRESS NOTES
"Pharmacy Kinetics 52 y.o. female on vancomycin day # 2 6/15/2018    Currently on Vancomycin 1700 mg iv q12hr    Indication for Treatment: R/O CNS infection    Pertinent history per medical record: Admitted on 2018 for ALOC and seizure. Patient intubated due to significant agitation. With concerns for possible CNS infection, empiric antibiotic therapy initiated. ID consulting.     Other antibiotics: Ceftriaxone 2 g IV q12h, acyclovir 660 mg IV q8h    Allergies: Penicillins     List concerns for renal function: None    Pertinent cultures to date:     18 PBC x 2     NGTD  18 BAL      NGTD    Recent Labs      18   1504  18   0548  06/15/18   0441   WBC  10.2  14.5*  14.9*   NEUTSPOLYS  64.90   --    --      Recent Labs      18   1504  18   0548  06/15/18   0441   BUN  12  8  9   CREATININE  0.82  0.75  0.64   ALBUMIN  4.2   --    --      Intake/Output Summary (Last 24 hours) at 06/15/18 1300  Last data filed at 06/15/18 1200   Gross per 24 hour   Intake          5938.17 ml   Output             1120 ml   Net          4818.17 ml      Blood pressure (!) 180/85, pulse 64, temperature 36.3 °C (97.4 °F), resp. rate 20, height 1.75 m (5' 8.9\"), weight 83.9 kg (184 lb 15.5 oz), SpO2 99 %. Temp (24hrs), Av.1 °C (98.7 °F), Min:36.3 °C (97.4 °F), Max:37.8 °C (100 °F)      A/P   1. Vancomycin dose change: Continue 1700 mg IV q12h scheduled dosing  2. Next vancomycin level: Trough tomorrow at 1130  3. Goal trough: 18-22 mcg/mL  4. Comments: Therapy with vancomycin continues empirically for possible CNS infection. Scheduled dosing initially started due to lack of risk factors for accumulation. Renal indices continue to improve, good UOP per d/w RN. LP performed today, will follow up on cultures when available. Will continue current scheduled dosing and follow up on trough level tomorrow to ensure appropriate clearance and drug levels. Pharmacy will continue to follow.     Hector Jara" PharmD, BCPS

## 2018-06-15 NOTE — PROGRESS NOTES
[facility]ist Progress Note    Date of Service: 6/15/2018    Chief Complaint  52 y.o. female admitted 2018 with encephalopathy and seizure    Interval Problem Update  Agitated intubated last night  MRI was negative for acute pathology  SBP   T max 99.4        Consultants/Specialty  Neurology  Critical care    Disposition  ICU        Review of Systems   Unable to perform ROS: Mental status change      Physical Exam  Laboratory/Imaging   Hemodynamics  Temp (24hrs), Av.2 °C (98.9 °F), Min:36.4 °C (97.6 °F), Max:37.8 °C (100 °F)   Temperature: 37.4 °C (99.4 °F)  Pulse  Av.7  Min: 50  Max: 119 Heart Rate (Monitored): 69  NIBP: (!) 94/51 CVP (mm Hg): (!) 10 MM HG    Respiratory  Car Vent Mode: APVCMV, Rate (breaths/min): 20, PEEP/CPAP: 8, PEEP/CPAP: 8, FiO2: 40, P Peak (PIP): 19, P MEAN: 11   Respiration: 20, Pulse Oximetry: 100 %     Work Of Breathing / Effort: Vented  RUL Breath Sounds: Crackles, RML Breath Sounds: Crackles, RLL Breath Sounds: Diminished, WENDY Breath Sounds: Crackles, LLL Breath Sounds: Diminished    Fluids    Intake/Output Summary (Last 24 hours) at 06/15/18 0959  Last data filed at 06/15/18 0900   Gross per 24 hour   Intake          5508.36 ml   Output              595 ml   Net          4913.36 ml       Nutrition  Orders Placed This Encounter   Procedures   • Diet NPO     Standing Status:   Standing     Number of Occurrences:   1     Order Specific Question:   Type:     Answer:   Now [1]     Order Specific Question:   Restrict to:     Answer:   Strict [1]     Physical Exam   Constitutional: She appears well-developed and well-nourished.   HENT:   Head: Normocephalic and atraumatic.   Mouth/Throat: No oropharyngeal exudate.   Eyes: Conjunctivae are normal. Right eye exhibits no discharge. Left eye exhibits no discharge. No scleral icterus.   Neck: Neck supple. No JVD present. No tracheal deviation present.   Cardiovascular: Regular rhythm.  Exam reveals no gallop and no  friction rub.    No murmur heard.  Tachycardia   Pulmonary/Chest: Effort normal and breath sounds normal. No stridor. No respiratory distress. She has no wheezes. She exhibits no tenderness.   Abdominal: Soft. Bowel sounds are normal. She exhibits no distension. There is no tenderness. There is no rebound and no guarding.   Musculoskeletal: She exhibits no edema or tenderness.   Neurological:   Sedated no focal deficits noted  Does not follow command   Skin: Skin is warm and dry. She is not diaphoretic. No cyanosis or erythema. Nails show no clubbing.   Psychiatric:   Sedated   Nursing note and vitals reviewed.      Recent Labs      06/13/18   1504  06/14/18   0548  06/15/18   0441   WBC  10.2  14.5*  14.9*   RBC  4.15*  4.00*  3.33*   HEMOGLOBIN  13.4  12.8  10.6*   HEMATOCRIT  38.2  36.5*  30.4*   MCV  92.0  91.3  91.3   MCH  32.3  32.0  31.8   MCHC  35.1*  35.1*  34.9   RDW  50.0  48.5  49.1   PLATELETCT  245  237  176   MPV  8.4*  8.4*  8.9*     Recent Labs      06/13/18   1504  06/14/18   0548  06/15/18   0441   SODIUM  137  135  137   POTASSIUM  3.5*  4.0  3.7   CHLORIDE  106  105  109   CO2  21  21  19*   GLUCOSE  90  118*  90   BUN  12  8  9   CREATININE  0.82  0.75  0.64   CALCIUM  9.4  8.5  7.9*     Recent Labs      06/13/18   1504   APTT  23.4*   INR  1.05                  Assessment/Plan     * Encephalopathy acute   Assessment & Plan    Likely viral encephalitis/meningitis  Empirically started on Vanco ceftriaxone and acyclovir pending LP   Pansinusitis noted on MRI continue current antibiotic coverage  Discussed with Dr. Sommer and Dr. Butler          Acute respiratory failure with hypoxemia (HCC)   Assessment & Plan    Vent management per critical care        Seizures (HCC)   Assessment & Plan    Continue Keppra  On continuous EEG neurology following        Electrolyte abnormality   Assessment & Plan    Hypokalemia hypophosphatemia  Replete with IV potassium phosphate          Hydradenitis    Assessment & Plan    No signs of active infection at this time        Leukocytosis   Assessment & Plan    ?  Reactive continue to monitor and follow up on cultures          Quality-Core Measures   Reviewed items::  Labs reviewed, Medications reviewed and Radiology images reviewed  Sam catheter::  Critically Ill - Requiring Accurate Measurement of Urinary Output  DVT prophylaxis pharmacological::  Enoxaparin (Lovenox)  DVT prophylaxis - mechanical:  SCDs  Ulcer Prophylaxis::  Yes  Antibiotics:  Treating active infection/contamination beyond 24 hours perioperative coverage

## 2018-06-15 NOTE — PROGRESS NOTES
1715 Pt extremely agitated restless flailing and resisting any care. 4 Rn's at bedside to help calm Pt.  1730 Pt medicated with Ativan and Precedex drip started and still unable to place EEG at this time.   1745 Pt remains agitated shaking head, flailing arms and legs.

## 2018-06-15 NOTE — PROCEDURES
Date of service:  6/14/2018    Title:  Central venous catheter placement - internal jugular vein    Indication:  Pneumonia, needs central venous access for medication administration    Narrative:    The right neck was prepped with chlorhexidine and draped in the usual sterile fashion.  1% Xylocaine solution was used for topical anesthesia.  A triple lumen central venous catheter was placed into the right internal jugular vein under ultrasound guidance using the technique described by Gisele without difficulty or apparent complication.  The line was sutured into place and a sterile dressing was placed over the line.  All 3 ports flush and return venous blood easily.  The patient tolerated the procedure quite nicely.  No complications are apparent.  A STAT CXR is ordered to confirm placement.

## 2018-06-15 NOTE — CARE PLAN
Problem: Safety  Goal: Free from accidental injury  Pt with seizures precautions in place. Pt with safety wrist restraints in place. Bed locked and in lowest position bed alarm on. Family educated on safety.     Problem: Knowledge Deficit  Goal: Maintain or improve baseline circulation, motion and sensation  Family educated on plan of care and test results from neurologist. Discussed plan for future tests today.     Problem: Mechanical Ventilation  Goal: Safe management of artificial airway and ventilation  Pt tolerating ventilation. Suctioned prn.

## 2018-06-15 NOTE — CARE PLAN
Problem: Ventilation Defect:  Goal: Ability to achieve and maintain unassisted ventilation or tolerate decreased levels of ventilator support    Intervention: Support and monitor invasive and noninvasive mechanical ventilation  Adult Ventilation Update    Total Vent Days: 2    Patient Lines/Drains/Airways Status    Active Airway     Name: Placement date: Placement time: Site: Days:    Airway Group ET Tube Oral 8.0 @ 26  06/14/18   1830   Oral   less than 1              Please assess ability to wean when clinically stable.

## 2018-06-15 NOTE — PROCEDURES
Date of service:  6/14/2018    Title:  Emergent endotracheal intubation    Indication:  Respiratory failure    Narrative:    The patient was sedated and paralyzed with 20 mg of IV etomidate and 70 mg of IV rocuronium.  A 8.0 Mongolian endotracheal tube was placed directly into the trachea using a #3 Glidescope without difficulty or apparent complication.  The CO2 detector made the appropriate color change, bilateral symmetrical breath sounds are present and fogging is present in the endotracheal tube.  All of this confirms that the endotracheal tube is indeed in the patient's trachea.  The patient tolerated the procedure quite nicely.  No complications are apparent.

## 2018-06-15 NOTE — PROGRESS NOTES
Pulmonary Critical Care Progress Note      Date of service:  6/14/2018    Chief Complaint:  Respiratory failure    Interval Events:  24 hour interval history reviewed  Reason for visit:  Respiratory failure, pneumonia, encephalopathy  Unable to provide ROS due to medical condition      I was called to see this lady due to her encephalopathy and intermittent agitation.  She has required intubation with sedation so that she can safely undergo MRI and lumbar puncture.      Review of Systems   Unable to perform ROS: Medical condition       Physical Exam   HENT:   Head: Normocephalic and atraumatic.   Right Ear: External ear normal.   Left Ear: External ear normal.   Nose: Nose normal.   Mouth/Throat: No oropharyngeal exudate.   Eyes: Conjunctivae and EOM are normal. Pupils are equal, round, and reactive to light. Right eye exhibits no discharge. Left eye exhibits no discharge. No scleral icterus.   Neck: Normal range of motion. Neck supple. No JVD present. No tracheal deviation present.   Cardiovascular: Intact distal pulses.  Exam reveals no gallop and no friction rub.    No murmur heard.  Sinus tachycardia   Pulmonary/Chest: No stridor. She has no wheezes. She has rales (Significant coarse crackles bilaterally).   Abdominal: Soft. Bowel sounds are normal. She exhibits no distension. There is no tenderness. There is no rebound and no guarding.   Musculoskeletal: She exhibits no tenderness or deformity.   No clubbing or cyanosis   Neurological:   Sedated on dexmedetomidine.  Intermittently agitated.   Skin: Skin is warm and dry. No rash noted. She is not diaphoretic. No erythema. No pallor.       PFSH:  No change.    Respiratory:  Car Vent Mode: APVCMV, Rate (breaths/min): 20, Vt Target (mL): 440, PEEP/CPAP: 8, FiO2: 100, Static Compliance (ml / cm H2O): 45.8, Control VTE (exp VT): 449  Pulse Oximetry: 100 %  Vent waveforms and airway mechanics reviewed in detail.  CXR images personally reviewed and compared to  prior images  CXR with worsening bibasilar opacities    Recent Labs      06/14/18 1954   ISTATAPH  7.384*   ISTATAPCO2  34.4   ISTATAPO2  199*   ISTATATCO2  22   [id number]  100*   ISTATARTHCO3  20.5   ISTATARTBE  -4   ISTATTEMP  98.0 F   ISTATFIO2  100   ISTATSPEC  Arterial   ISTATAPHTC  7.389*   [id number]  197*       HemoDynamics:  Pulse: (!) 58, Heart Rate (Monitored): (!) 58  NIBP: 128/65       Recent Labs      06/13/18   1504   TROPONINI  <0.01       Neuro:    Fluids:  Intake/Output       06/12/18 0700 - 06/13/18 0659 (Not Admitted) 06/13/18 0700 - 06/14/18 0659 06/14/18 0700 - 06/15/18 0659      [id number] 6475-3794 Total 6479-6721 3653-1765 Total 2315-7839 7390-6419 Total       Intake    I.V.  --  -- --  --  800 800  2098.8  -- 2098.8    Precedex Volume -- -- -- -- -- -- 8.8 -- 8.8    IV Piggyback Volume (IV Piggyback) -- -- -- -- -- -- 1090 -- 1090    IV Volume (20k 0.9Ns) -- -- -- --  -- 1000    Total Intake -- -- -- -- .8 -- 2098.8       Output    Urine  --  -- --  --  0 0  --  -- --    Number of Times Incontinent of Urine -- -- -- -- 4 x 4 x 6 x -- 6 x    Urine Void (mL) (non-catheter) -- -- -- -- 0 0 -- -- --    Stool  --  -- --  --  -- --  --  -- --    Number of Times Stooled -- -- -- -- 0 x 0 x -- -- --    Total Output -- -- -- -- 0 0 -- -- --       Net I/O     -- -- -- -- .8 -- 2098.8        Weight: 83.9 kg (184 lb 15.5 oz)  Recent Labs      06/13/18   1504  06/14/18   0548   SODIUM  137  135   POTASSIUM  3.5*  4.0   CHLORIDE  106  105   CO2  21  21   BUN  12  8   CREATININE  0.82  0.75   MAGNESIUM  1.8   --    CALCIUM  9.4  8.5       GI/Nutrition:    Liver Function  Recent Labs      06/13/18   1504  06/14/18   0548   ALTSGPT  27   --    ASTSGOT  22   --    ALKPHOSPHAT  42   --    TBILIRUBIN  0.6   --    GLUCOSE  90  118*       Heme:  Recent Labs      06/13/18   1504  06/14/18   0548   RBC  4.15*  4.00*   HEMOGLOBIN  13.4  12.8   HEMATOCRIT  38.2  36.5*    PLATELETCT  245  237   PROTHROMBTM  13.4   --    APTT  23.4*   --    INR  1.05   --        Infectious Disease:  Temp  Av.6 °C (99.6 °F)  Min: 36.9 °C (98.5 °F)  Max: 38.1 °C (100.6 °F)  Recent Labs      18   1504  18   0548   WBC  10.2  14.5*   NEUTSPOLYS  64.90   --    LYMPHOCYTES  27.30   --    MONOCYTES  5.70   --    EOSINOPHILS  1.10   --    BASOPHILS  0.60   --    ASTSGOT  22   --    ALTSGPT  27   --    ALKPHOSPHAT  42   --    TBILIRUBIN  0.6   --      Current Facility-Administered Medications   Medication Dose Frequency Provider Last Rate Last Dose   • acetaminophen (TYLENOL) suppository 650 mg  650 mg Q6HRS PRN Puneet Woo M.D.   650 mg at 18 0122   • acyclovir (ZOVIRAX) 660 mg in  mL IVPB  10 mg/kg (Ideal) Q8HRS Anderson Mendes M.D.   Stopped at 18 1158   • cefTRIAXone (ROCEPHIN) 2 g in  mL IVPB  2 g Q12HRS Anderson Mendes M.D.   Stopped at 18 1054   • MD ALERT... vancomycin per pharmacy protocol   pharmacy to dose Anderson Mendes M.D.       • [START ON 6/15/2018] vancomycin 1,700 mg in  mL IVPB  1,700 mg Q12HR Alli Torres M.D.       • levETIRAcetam (KEPPRA) 1,500 mg in  mL IVPB  1,500 mg Q12HRS Anderson Mendes M.D.       • dexmedetomidine (PRECEDEX) 400 mcg in D5W 100 mL infusion  0.1-1.5 mcg/kg/hr Continuous Alli Torres M.D.   Stopped at 18   • propofol (DIPRIVAN) injection  0-80 mcg/kg/min Continuous Puneet Woo M.D. 20.1 mL/hr at 18 40 mcg/kg/min at 18   • Respiratory Care per Protocol   Continuous RT Puneet Woo M.D.       • senna-docusate (PERICOLACE or SENOKOT S) 8.6-50 MG per tablet 2 Tab  2 Tab BID Puneet Woo M.D.   Stopped at 18 2100    And   • polyethylene glycol/lytes (MIRALAX) PACKET 1 Packet  1 Packet QDAY PRN Puneet Woo M.D.        And   • magnesium hydroxide (MILK OF MAGNESIA) suspension 30 mL  30 mL QDAY PRN Puneet Jerome  TAMIA Tipton        And   • bisacodyl (DULCOLAX) suppository 10 mg  10 mg QDAY PRN Puneet Woo M.D.       • MD ALERT...Adult ICU Electrolyte Replacement per Pharmacy Protocol   pharmacy to dose Puneet Woo M.D.       • Pharmacy Consult: Enteral tube feeding - review meds/change route/product selection  1 Each PRN Puneet Woo M.D.       • enoxaparin (LOVENOX) inj 40 mg  40 mg DAILY Puneet Woo M.D.       • fentaNYL (SUBLIMAZE) injection 25 mcg  25 mcg Q HOUR PRN Puneet Woo M.D.        Or   • fentaNYL (SUBLIMAZE) injection 50 mcg  50 mcg Q HOUR PRN Puneet Woo M.D.        Or   • fentaNYL (SUBLIMAZE) injection 100 mcg  100 mcg Q HOUR PRN Puneet Woo M.D.       • ipratropium-albuterol (DUONEB) nebulizer solution  3 mL Q2HRS PRN (RT) Puneet Woo M.D.       • insulin regular (HUMULIN R) injection 2-9 Units  2-9 Units Q6HRS Puneet Woo M.D.        And   • glucose 4 g chewable tablet 16 g  16 g Q15 MIN PRN Puneet Woo M.D.        And   • dextrose 50% (D50W) injection 25 mL  25 mL Q15 MIN PRN Puneet Woo M.D.       • LORazepam (ATIVAN) injection 2-4 mg  2-4 mg Q HOUR PRN Puneet Woo M.D.       • acetaminophen (TYLENOL) tablet 650 mg  650 mg Q6HRS PRN Maurilio Schumacher M.D.       • ondansetron (ZOFRAN) syringe/vial injection 4 mg  4 mg Q4HRS PRN Maurilio Schumacher M.D.   4 mg at 06/13/18 2046   • ondansetron (ZOFRAN ODT) dispertab 4 mg  4 mg Q4HRS PRN Maurilio Schumacher M.D.       • promethazine (PHENERGAN) tablet 12.5-25 mg  12.5-25 mg Q4HRS PRN Maurilio Schumacher M.D.       • promethazine (PHENERGAN) suppository 12.5-25 mg  12.5-25 mg Q4HRS PRN Maurilio Schumacher M.D.       • prochlorperazine (COMPAZINE) injection 5-10 mg  5-10 mg Q4HRS PRN Maurilio Schumacher M.D.       • 0.9 % NaCl with KCl 20 mEq infusion   Continuous Puneet Woo M.D. 100 mL/hr at 06/14/18 0900        Last reviewed on 6/13/2018  4:43 PM by Mila Tovar    Quality  Measures:  Labs reviewed, Medications reviewed and Radiology images reviewed  Sam catheter: Critically Ill - Requiring Accurate Measurement of Urinary Output  Central line in place: Need for access    DVT Prophylaxis: Enoxaparin (Lovenox)  DVT prophylaxis - mechanical: SCDs  Ulcer prophylaxis: Yes  Antibiotics: Treating active infection/contamination beyond 24 hours perioperative coverage        Assessment and Plan:    Acute hypoxemic respiratory failure   -Emergently intubated on 6/14   -Full vent support    Pneumonia   -Aspiration risk with acute encephalopathy   -Sputum culture   -Continue ceftriaxone and vancomycin    Acute unspecified encephalopathy              -Head CT without acute pathology              -Possibly related to seizures   -MRI of the brain   -Lumbar puncture to obtain CSF   -Continue empiric Rocephin, vancomycin and acyclovir     Generalized tonic-clonic seizure              -Continue Keppra, 1500 mg IV every 12 hours              -Seizure precautions              -EEG      I have assessed and reassessed her respiratory status with ventilator adjustments, airway mechanics, ventilator waveforms, blood pressure, hemodynamics, cardiovascular status and neurologic status with titration of sedation.  She is at increased risk for worsening respiratory and CNS system dysfunction.    High risk of deterioration and worsening vital organ dysfunction and death without the above critical care interventions.    Critical Care Time:  90 minutes  [id number]  No time overlap  Time excludes procedures  Discussed with RN, RT, Hospitalist, Family

## 2018-06-15 NOTE — PROGRESS NOTES
Pt HR 49-50 and BP 70-80 systolic. Propofol paused with no improvement in VS. Dr Woo updated. New orders recieved

## 2018-06-15 NOTE — PROGRESS NOTES
EEG tech called and updated that they will attempt to place the continuous EEG on Pt in the Am per Ok from Dr Mendes.

## 2018-06-15 NOTE — PROGRESS NOTES
Received bedside report from Kathy Rn. Plan of care discussed. Lines labs meds and orders reviewed. Neuro assessment completed with Kathy Rn.  at bedside.

## 2018-06-16 ENCOUNTER — APPOINTMENT (OUTPATIENT)
Dept: RADIOLOGY | Facility: MEDICAL CENTER | Age: 53
DRG: 987 | End: 2018-06-16
Attending: INTERNAL MEDICINE
Payer: COMMERCIAL

## 2018-06-16 PROBLEM — D72.829 LEUKOCYTOSIS: Status: RESOLVED | Noted: 2018-06-14 | Resolved: 2018-06-16

## 2018-06-16 LAB
ACTION RANGE TRIGGERED IACRT: NO
ANION GAP SERPL CALC-SCNC: 7 MMOL/L (ref 0–11.9)
BASE EXCESS BLDA CALC-SCNC: -7 MMOL/L (ref -4–3)
BODY TEMPERATURE: ABNORMAL DEGREES
BUN SERPL-MCNC: 8 MG/DL (ref 8–22)
CALCIUM SERPL-MCNC: 8.1 MG/DL (ref 8.5–10.5)
CHLORIDE SERPL-SCNC: 112 MMOL/L (ref 96–112)
CO2 BLDA-SCNC: 18 MMOL/L (ref 20–33)
CO2 SERPL-SCNC: 20 MMOL/L (ref 20–33)
CREAT SERPL-MCNC: 0.68 MG/DL (ref 0.5–1.4)
ERYTHROCYTE [DISTWIDTH] IN BLOOD BY AUTOMATED COUNT: 50.5 FL (ref 35.9–50)
GLUCOSE BLD-MCNC: 90 MG/DL (ref 65–99)
GLUCOSE BLD-MCNC: 98 MG/DL (ref 65–99)
GLUCOSE SERPL-MCNC: 103 MG/DL (ref 65–99)
HCO3 BLDA-SCNC: 17.3 MMOL/L (ref 17–25)
HCT VFR BLD AUTO: 29.4 % (ref 37–47)
HGB BLD-MCNC: 10.3 G/DL (ref 12–16)
HSV1+2 DNA SPEC QL NAA+PROBE: NORMAL
INST. QUALIFIED PATIENT IIQPT: YES
MAGNESIUM SERPL-MCNC: 1.6 MG/DL (ref 1.5–2.5)
MCH RBC QN AUTO: 32.4 PG (ref 27–33)
MCHC RBC AUTO-ENTMCNC: 35 G/DL (ref 33.6–35)
MCV RBC AUTO: 92.5 FL (ref 81.4–97.8)
O2/TOTAL GAS SETTING VFR VENT: 30 %
PCO2 BLDA: 30.2 MMHG (ref 26–37)
PCO2 TEMP ADJ BLDA: 30.4 MMHG (ref 26–37)
PH BLDA: 7.37 [PH] (ref 7.4–7.5)
PH TEMP ADJ BLDA: 7.36 [PH] (ref 7.4–7.5)
PHOSPHATE SERPL-MCNC: 2.5 MG/DL (ref 2.5–4.5)
PLATELET # BLD AUTO: 157 K/UL (ref 164–446)
PMV BLD AUTO: 8.8 FL (ref 9–12.9)
PO2 BLDA: 89 MMHG (ref 64–87)
PO2 TEMP ADJ BLDA: 90 MMHG (ref 64–87)
POTASSIUM SERPL-SCNC: 3.6 MMOL/L (ref 3.6–5.5)
RBC # BLD AUTO: 3.18 M/UL (ref 4.2–5.4)
SAO2 % BLDA: 97 % (ref 93–99)
SIGNIFICANT IND 70042: NORMAL
SITE SITE: NORMAL
SODIUM SERPL-SCNC: 139 MMOL/L (ref 135–145)
SOURCE SOURCE: NORMAL
SPECIMEN DRAWN FROM PATIENT: ABNORMAL
WBC # BLD AUTO: 8.9 K/UL (ref 4.8–10.8)

## 2018-06-16 PROCEDURE — A9270 NON-COVERED ITEM OR SERVICE: HCPCS | Performed by: INTERNAL MEDICINE

## 2018-06-16 PROCEDURE — 36600 WITHDRAWAL OF ARTERIAL BLOOD: CPT

## 2018-06-16 PROCEDURE — 95951 HCHG EEG-VIDEO-24HR: CPT | Mod: 52

## 2018-06-16 PROCEDURE — 700111 HCHG RX REV CODE 636 W/ 250 OVERRIDE (IP): Performed by: INTERNAL MEDICINE

## 2018-06-16 PROCEDURE — 770022 HCHG ROOM/CARE - ICU (200)

## 2018-06-16 PROCEDURE — 700102 HCHG RX REV CODE 250 W/ 637 OVERRIDE(OP): Performed by: INTERNAL MEDICINE

## 2018-06-16 PROCEDURE — 82962 GLUCOSE BLOOD TEST: CPT

## 2018-06-16 PROCEDURE — 700105 HCHG RX REV CODE 258: Performed by: PSYCHIATRY & NEUROLOGY

## 2018-06-16 PROCEDURE — 85027 COMPLETE CBC AUTOMATED: CPT

## 2018-06-16 PROCEDURE — 84100 ASSAY OF PHOSPHORUS: CPT

## 2018-06-16 PROCEDURE — 71045 X-RAY EXAM CHEST 1 VIEW: CPT

## 2018-06-16 PROCEDURE — 82803 BLOOD GASES ANY COMBINATION: CPT

## 2018-06-16 PROCEDURE — 94003 VENT MGMT INPAT SUBQ DAY: CPT

## 2018-06-16 PROCEDURE — 99233 SBSQ HOSP IP/OBS HIGH 50: CPT | Performed by: INTERNAL MEDICINE

## 2018-06-16 PROCEDURE — 700101 HCHG RX REV CODE 250: Performed by: INTERNAL MEDICINE

## 2018-06-16 PROCEDURE — 99233 SBSQ HOSP IP/OBS HIGH 50: CPT | Performed by: HOSPITALIST

## 2018-06-16 PROCEDURE — 700111 HCHG RX REV CODE 636 W/ 250 OVERRIDE (IP): Performed by: HOSPITALIST

## 2018-06-16 PROCEDURE — 99291 CRITICAL CARE FIRST HOUR: CPT | Performed by: INTERNAL MEDICINE

## 2018-06-16 PROCEDURE — 80048 BASIC METABOLIC PNL TOTAL CA: CPT

## 2018-06-16 PROCEDURE — 83735 ASSAY OF MAGNESIUM: CPT

## 2018-06-16 PROCEDURE — 4A10X4Z MONITORING OF CENTRAL NERVOUS ELECTRICAL ACTIVITY, EXTERNAL APPROACH: ICD-10-PCS | Performed by: PSYCHIATRY & NEUROLOGY

## 2018-06-16 PROCEDURE — 700111 HCHG RX REV CODE 636 W/ 250 OVERRIDE (IP): Performed by: PSYCHIATRY & NEUROLOGY

## 2018-06-16 RX ORDER — FAMOTIDINE 20 MG/1
20 TABLET, FILM COATED ORAL 2 TIMES DAILY
Status: DISCONTINUED | OUTPATIENT
Start: 2018-06-16 | End: 2018-06-17

## 2018-06-16 RX ADMIN — ENOXAPARIN SODIUM 40 MG: 100 INJECTION SUBCUTANEOUS at 09:18

## 2018-06-16 RX ADMIN — POTASSIUM CHLORIDE AND SODIUM CHLORIDE: 900; 150 INJECTION, SOLUTION INTRAVENOUS at 01:14

## 2018-06-16 RX ADMIN — PROPOFOL 60 MCG/KG/MIN: 10 INJECTION, EMULSION INTRAVENOUS at 18:31

## 2018-06-16 RX ADMIN — POTASSIUM CHLORIDE AND SODIUM CHLORIDE: 900; 150 INJECTION, SOLUTION INTRAVENOUS at 20:57

## 2018-06-16 RX ADMIN — PROPOFOL 60 MCG/KG/MIN: 10 INJECTION, EMULSION INTRAVENOUS at 21:49

## 2018-06-16 RX ADMIN — SODIUM CHLORIDE 1500 MG: 9 INJECTION, SOLUTION INTRAVENOUS at 21:21

## 2018-06-16 RX ADMIN — DOCUSATE SODIUM AND SENNOSIDES 2 TABLET: 8.6; 5 TABLET, FILM COATED ORAL at 09:18

## 2018-06-16 RX ADMIN — PROPOFOL 30 MCG/KG/MIN: 10 INJECTION, EMULSION INTRAVENOUS at 02:06

## 2018-06-16 RX ADMIN — DOCUSATE SODIUM AND SENNOSIDES 2 TABLET: 8.6; 5 TABLET, FILM COATED ORAL at 20:59

## 2018-06-16 RX ADMIN — PROPOFOL 60 MCG/KG/MIN: 10 INJECTION, EMULSION INTRAVENOUS at 10:57

## 2018-06-16 RX ADMIN — PROPOFOL 60 MCG/KG/MIN: 10 INJECTION, EMULSION INTRAVENOUS at 14:19

## 2018-06-16 RX ADMIN — POLYETHYLENE GLYCOL (3350) 1 PACKET: 17 POWDER, FOR SOLUTION ORAL at 20:59

## 2018-06-16 RX ADMIN — FAMOTIDINE 20 MG: 10 INJECTION INTRAVENOUS at 20:58

## 2018-06-16 RX ADMIN — POTASSIUM CHLORIDE AND SODIUM CHLORIDE: 900; 150 INJECTION, SOLUTION INTRAVENOUS at 11:43

## 2018-06-16 RX ADMIN — ACYCLOVIR SODIUM 660 MG: 500 INJECTION, SOLUTION INTRAVENOUS at 06:06

## 2018-06-16 RX ADMIN — SODIUM CHLORIDE 1500 MG: 9 INJECTION, SOLUTION INTRAVENOUS at 09:18

## 2018-06-16 RX ADMIN — CEFTRIAXONE 2 G: 2 INJECTION, POWDER, FOR SOLUTION INTRAMUSCULAR; INTRAVENOUS at 09:18

## 2018-06-16 RX ADMIN — PROPOFOL 25 MCG/KG/MIN: 10 INJECTION, EMULSION INTRAVENOUS at 06:54

## 2018-06-16 ASSESSMENT — LIFESTYLE VARIABLES: REASON UNABLE TO ASSESS: PATIENT INTUBATED/SEDATED

## 2018-06-16 NOTE — PROGRESS NOTES
Family at bedside educated multiple times about importance of wearing mask and droplet precautions. Family verbalizes understanding

## 2018-06-16 NOTE — PROGRESS NOTES
Infectious Disease Progress Note    Author: Naina Sommer M.D. Date & Time of service: 2018  9:47 AM    Chief Complaint:  Altered mental status    Interval History:  2018 MAXIMUM TEMPERATURE 100.4 and remains on the ventilator FiO2 40% WBC 9 platelets 157 patient is much more responsive. Following commands  Labs Reviewed, Medications Reviewed and Radiology Reviewed.    Review of Systems:  Review of Systems   Unable to perform ROS: Intubated       Hemodynamics:  Temp (24hrs), Av.1 °C (98.8 °F), Min:36.3 °C (97.3 °F), Max:38 °C (100.4 °F)  Temperature: 37 °C (98.6 °F)  Pulse  Av.7  Min: 50  Max: 119Heart Rate (Monitored): 83  NIBP: 134/65  CVP (mm Hg): (!) 250 MM HG    Physical Exam:  Physical Exam   Constitutional: She is intubated.   HENT:   Mouth/Throat: No oropharyngeal exudate.   Eyes: No scleral icterus.   Cardiovascular: Regular rhythm.    No murmur heard.  Pulmonary/Chest: She is intubated. She has no wheezes. She has no rales.   Abdominal: Soft. There is no tenderness. There is no rebound.   Musculoskeletal: She exhibits no edema.   Neurological:   Following commands   Skin: No erythema.   Vitals reviewed.      Meds:    Current Facility-Administered Medications:   •  NORepinephrine (LEVOPHED) infusion  •  ziprasidone  •  enoxaparin (LOVENOX) injection  •  acetaminophen  •  acyclovir (ZOVIRAX) IV  •  cefTRIAXone (ROCEPHIN) IV  •  MD ALERT... vancomycin  •  vancomycin  •  levETIRAcetam (KEPPRA) IV  •  propofol **AND** Triglycerides Starting now and then Every 3 Days  •  Respiratory Care per Protocol  •  senna-docusate **AND** polyethylene glycol/lytes **AND** magnesium hydroxide **AND** bisacodyl  •  MD ALERT...Adult ICU Electrolyte Replacement per Pharmacy Protocol  •  Action is required: Protocol 751 Tube Feeding Enteral Feeding has been implemented **AND** Protocol 751 Document tube feeding in I&O doc flow sheet **AND** Protocol 751 elevate HOB **AND** Protocol 751 Nutrition (Dietary)  Consult **AND** [START ON 6/18/2018] Weigh Patient **AND** Protocol 751 Tube Placement **AND** [CANCELED] Protocol 751 Diet Tube Feeding **AND** Protocol 751 Instructions **AND** Protocol 751 Goal Rate **AND** Protocol 751 Start Rate **AND** Protocol 751 Order to be Separately Placed by RN **AND** [START ON 6/18/2018] CRP Quantitative (Non-Cardiac) **AND** [START ON 6/18/2018] Prealbumin **AND** Pharmacy **AND** Protocol 751 Nursing Communication Tube Occlusion **AND** Protocol 751 Tube Maintenance  •  fentaNYL **OR** fentaNYL **OR** fentaNYL  •  ipratropium-albuterol  •  LORazepam  •  acetaminophen  •  ondansetron  •  ondansetron  •  promethazine  •  promethazine  •  prochlorperazine  •  0.9 % NaCl with KCl 20 mEq 1,000 mL    Labs:  Recent Labs      06/13/18   1504  06/14/18   0548  06/15/18   0441  06/16/18   0621   WBC  10.2  14.5*  14.9*  8.9   RBC  4.15*  4.00*  3.33*  3.18*   HEMOGLOBIN  13.4  12.8  10.6*  10.3*   HEMATOCRIT  38.2  36.5*  30.4*  29.4*   MCV  92.0  91.3  91.3  92.5   MCH  32.3  32.0  31.8  32.4   RDW  50.0  48.5  49.1  50.5*   PLATELETCT  245  237  176  157*   MPV  8.4*  8.4*  8.9*  8.8*   NEUTSPOLYS  64.90   --    --    --    LYMPHOCYTES  27.30   --    --    --    MONOCYTES  5.70   --    --    --    EOSINOPHILS  1.10   --    --    --    BASOPHILS  0.60   --    --    --      Recent Labs      06/14/18   0548  06/15/18   0441  06/16/18   0621   SODIUM  135  137  139   POTASSIUM  4.0  3.7  3.6   CHLORIDE  105  109  112   CO2  21  19*  20   GLUCOSE  118*  90  103*   BUN  8  9  8     Recent Labs      06/13/18   1504  06/14/18   0548  06/15/18   0441  06/16/18   0621   ALBUMIN  4.2   --    --    --    TBILIRUBIN  0.6   --    --    --    ALKPHOSPHAT  42   --    --    --    TOTPROTEIN  8.1   --    --    --    ALTSGPT  27   --    --    --    ASTSGOT  22   --    --    --    CREATININE  0.82  0.75  0.64  0.68       Imaging:  Ct-cta Head With & W/o-post Process    Result Date: 6/14/2018 6/14/2018 9:13 PM  HISTORY/REASON FOR EXAM:  Altered Mental Status TECHNIQUE/EXAM DESCRIPTION: CT angiogram of the [place] without and with contrast.  Initial precontrast images were obtained of the head from the skull base through the vertex.  Postcontrast images were obtained of the [place] following the power injection of nonionic contrast at 5.0 mL/sec. CT venogram images were then obtained. Thin-section helical images were obtained with overlapping reconstruction interval. Coronal and sagittal multiplanar volume reformats were generated.  3D angiographic images were reviewed on PACS.  Maximum intensity projection (MIP) images were generated and reviewed. 100 mL of Omnipaque 350 nonionic contrast was injected intravenously. Low dose optimization technique was utilized for this CT exam including automated exposure control and adjustment of the mA and/or kV according to patient size. COMPARISON:  June 13, 2018. FINDINGS: The posterior circulation shows the distal vertebral arteries to be patent. The vertebrobasilar confluence is intact. The basilar artery is patent. No aneurysm or occlusive lesion is evident. There is persistent fetal morphology of the bilateral posterior cerebral arteries with contribution from the basilar artery. The anterior circulation shows no stenotic or occlusive lesion. No aneurysm is evident about the [place]. The dural sinuses are well opacified without apparent thrombus filling defect. The brain is unremarkable. There is extensive opacification of the bilateral paranasal sinuses.     1.  CT angiogram of the [place] within normal limits. 2.  No dural sinus thrombosis appreciated. 3.  Persistent fetal morphology of the bilateral posterior cerebral arteries. 4.  Severe pansinusitis    Ct-head W/o    Result Date: 6/13/2018 6/13/2018 3:51 PM HISTORY/REASON FOR EXAM: Active seizure. TECHNIQUE/EXAM DESCRIPTION AND NUMBER OF VIEWS: CT of the head without contrast. Up to date  radiation dose reduction adjustments have been utilized to meet ALARA standards for radiation dose reduction. COMPARISON: None available FINDINGS: There is no evidence of mass or mass effect. CSF spaces are normal. There is no periventricular chronic small vessel ischemic change present. There is no intracranial hemorrhage seen. The calvarium is intact. There is no scalp injury. There is bilateral maxillary, ethmoid, frontal and sphenoid sinus disease.     No evidence of acute intracranial process.    Dx-chest-limited (1 View)    Result Date: 6/14/2018 6/14/2018 7:32 PM HISTORY/REASON FOR EXAM:  Line placement, tube placement TECHNIQUE/EXAM DESCRIPTION AND NUMBER OF VIEWS: Single portable view of the chest. COMPARISON: 6/13/2018 FINDINGS: Cardiac mediastinal contour is unchanged. Lungs show hypoinflation. Linear opacities in the LEFT mid and RIGHT lower lung regions. RIGHT internal jugular catheter with tip at the lower SVC, approximately 2 cm above the cavoatrial junction. Endotracheal tube in place with tip approximately 5 cm above the yrn. No pneumothorax. No major bony abnormality is seen.     1.  Supportive tubing as described above. 2.  No pneumothorax. 3.  Hypoinflation with worsening bibasilar atelectasis.    Dx-chest-limited (1 View)    Result Date: 6/13/2018 6/13/2018 4:14 PM HISTORY/REASON FOR EXAM:  Increased shortness of breath TECHNIQUE/EXAM DESCRIPTION AND NUMBER OF VIEWS: Single portable view of the chest. COMPARISON: None FINDINGS: Heart size is within normal limits. No focal infiltrates or consolidations are identified in the lungs. No pleural fluid collections are identified. No pneumothorax is appreciated.     No acute cardiac or pulmonary abnormality is identified.    Dx-chest-portable (1 View)    Result Date: 6/16/2018 6/16/2018 2:50 AM HISTORY/REASON FOR EXAM: For indication of respiratory failure TECHNIQUE/EXAM DESCRIPTION:  Single AP view of the chest. COMPARISON: Yesterday FINDINGS:  Position of medical devices appears stable. The cardiac silhouette appears within normal limits. The mediastinal contour appears within normal limits.  The central pulmonary vasculature appears normal. The lungs appear well expanded bilaterally.  Hazy linear density in the bilateral lung bases is observed. No significant pleural effusions are identified. The bony structures appear age-appropriate.     1.  Bibasilar atelectasis, no focal infiltrate is identified    Dx-chest-portable (1 View)    Result Date: 6/15/2018    6/15/2018 2:09 AM HISTORY/REASON FOR EXAM: For indication of respiratory failure TECHNIQUE/EXAM DESCRIPTION:  Single AP view of the chest. COMPARISON: Yesterday FINDINGS: Position of medical devices appears stable. The cardiac silhouette appears within normal limits. The mediastinal contour appears within normal limits.  The central pulmonary vasculature appears normal. The lungs appear well expanded bilaterally.  Hazy linear density in the bilateral lung bases is observed. No significant pleural effusions are identified. The bony structures appear age-appropriate.     1.  Bibasilar atelectasis, no focal infiltrate is identified    Dx-lumbar Puncture For Diagnosis    Result Date: 6/15/2018  6/15/2018 3:16 PM HISTORY/REASON FOR EXAM:  Altered level subconsciousness. TECHNIQUE/EXAM DESCRIPTION AND NUMBER OF VIEWS: Fluoroscopic-guided lumbar puncture. 1 fluoroscopic images obtained. Fluoroscopy time: 24 seconds PROCEDURE:     Informed consent was obtained. A timeout was taken. With the patient in prone position, the lumbar region was prepped and draped in a sterile manner. Following local anesthesia with 1% lidocaine, a 22-gauge spinal needle was advanced into the subarachnoid space at the L 4/5 level.  Approximately 9 cc of CSF was collected and the specimens sent to the lab for the studies requested. The patient tolerated the procedure well with no evidence of complication.     Fluoroscopic-guided  lumbar puncture as described above.    Mr-brain-with & W/o    Result Date: 6/15/2018  6/14/2018 9:07 PM HISTORY/REASON FOR EXAM:  Seizure. TECHNIQUE/EXAM DESCRIPTION: MRI of the brain and temporal lobes without and with contrast (seizure protocol). The study was performed on a [employer] Signa 1.5 Jenna MRI scanner. Spoiled-GRASS sagittal, thin-section T2 axial, T1 coronal, T1 postcontrast coronal, T1 postcontrast axial, and FLAIR coronal images were obtained of the whole brain. Additional thin-section T2 fast spin-echo oblique images were also obtained to display the temporal lobes and hippocampal formations. 18 mL Omniscan contrast were administered intravenously. COMPARISON:  None. FINDINGS: The cortical sulci and gyri are within normal limits. The ventricular system is within normal limits. The bony calvaria are intact. There is no evidence of extra-axial fluid collection or intracranial mass effect. There is a pattern of mild supratentorial white matter disease with scattered foci of bright T2 and FLAIR signal in the subcortical and deep white matter of both hemispheres consistent with small vessel ischemic change versus demyelination or gliosis. There is no evidence of abnormal diffusion-weighted signal intensity in the brain. There is no evidence of abnormal enhancement in the brain parenchyma. There are normal flow voids in the cavernous carotid arteries and M1 segments. There are normal flow voids in the distal vertebral basilar system. There are normal flow voids in the dural venous sinuses. There is diffuse paranasal sinus mucosal thickening. Mastoid air cells are well aerated.     1.  No evidence of acute territorial infarct, intracranial hemorrhage or mass lesion. 2.  Mild microangiopathic ischemic change versus demyelination or gliosis. 3.  Pansinusitis.    Dx-abdomen For Tube Placement    Result Date: 6/15/2018  6/15/2018 3:58 PM HISTORY/REASON FOR EXAM:  Coretrack tube placement. TECHNIQUE/EXAM DESCRIPTION  AND NUMBER OF VIEWS:  1 view(s) of the abdomen. COMPARISON:  None. FINDINGS: Enteric tube has been placed. The tip projects over the distal stomach/pylorus. The bowel gas pattern is within normal limits.     Feeding tube tip at the distal stomach/pylorus.      Micro:  Results     Procedure Component Value Units Date/Time    QUANT BRONCHIAL WASHING [[id number]] Collected:  06/14/18 1845    Order Status:  Completed Specimen:  Respirate Updated:  06/15/18 2153     Significant Indicator NEG     Source RESP     Site Quantitative BAL RLL     Quantitative Bronch Washing Culture in progress.     Gram Stain Result Moderate WBCs.  Moderate mixed bacteria, no predominant organism seen.  <5% intracellular organisms.      AFB CULTURE [[id number]] Collected:  06/14/18 1845    Order Status:  Completed Specimen:  Respirate Updated:  06/15/18 2153     Significant Indicator NEG     Source RESP     Site Quantitative BAL RLL     AFB Culture Culture in progress.     AFB Smear Results No acid fast bacilli seen.    FUNGAL CULTURE [[id number]] Collected:  06/14/18 1845    Order Status:  Completed Specimen:  Respirate Updated:  06/15/18 2153     Significant Indicator NEG     Source RESP     Site Quantitative BAL RLL     Fungal Culture Culture in progress.    GRAM STAIN [[id number]] Collected:  06/15/18 1555    Order Status:  Completed Specimen:  CSF Updated:  06/15/18 1719     Significant Indicator .     Source CSF     Site Tap     Gram Stain Result Rare WBCs.  No organisms seen.      HSV 1/2 BY PCR(HERPES) [[id number]] Collected:  06/15/18 1555    Order Status:  Completed Updated:  06/15/18 1651    CSF CULTURE [[id number]] Collected:  06/15/18 1555    Order Status:  Completed Updated:  06/15/18 1650    BLOOD CULTURE [[id number]] Collected:  06/14/18 0122    Order Status:  Completed Specimen:  Blood from Peripheral Updated:  06/15/18 0753     Significant Indicator NEG     Source BLD     Site PERIPHERAL     Blood Culture No Growth    Note: Blood  "cultures are incubated for 5 days and  are monitored continuously.Positive blood cultures  are called to the RN and reported as soon as  they are identified.      Narrative:       Collected By:[id number] [employer] [facility]  Per Hospital Policy: Only change Specimen Src: to \"Line\" if  specified by physician order.    BLOOD CULTURE [[id number]] Collected:  06/14/18 0122    Order Status:  Completed Specimen:  Blood from Peripheral Updated:  06/15/18 0753     Significant Indicator NEG     Source BLD     Site PERIPHERAL     Blood Culture No Growth    Note: Blood cultures are incubated for 5 days and  are monitored continuously.Positive blood cultures  are called to the RN and reported as soon as  they are identified.      Narrative:       Collected By:[id number] [employer] [facility]  Per Hospital Policy: Only change Specimen Src: to \"Line\" if  specified by physician order.    GRAM STAIN [[id number]] Collected:  06/14/18 1845    Order Status:  Completed Specimen:  Respirate Updated:  06/15/18 0748     Significant Indicator .     Source RESP     Site Quantitative BAL RLL     Gram Stain Result Moderate WBCs.  Moderate mixed bacteria, no predominant organism seen.  <5% intracellular organisms.      CSF CULTURE [[id number]]     Order Status:  No result Specimen:  CSF from Tap     HSV 1/2 BY PCR(HERPES) [[id number]]     Order Status:  No result Specimen:  CSF from Spinal Fluid     URINALYSIS [[id number]]  (Abnormal) Collected:  06/13/18 1953    Order Status:  Completed Specimen:  Urine Updated:  06/13/18 2042     Color Yellow     Character Clear     Specific Gravity 1.022     Ph 5.0     Glucose Negative mg/dL      Ketones 40 (A) mg/dL      Protein Negative mg/dL      Bilirubin Negative     Urobilinogen, Urine 0.2     Nitrite Negative     Leukocyte Esterase Negative     Occult Blood Negative     Micro Urine Req see below     Comment: Microscopic examination not performed when specimen is clear  and chemically negative for protein, blood, " leukocyte esterase  and nitrite.         Culture Respiratory without Gram Stain [[id number]]     Order Status:  Completed Specimen:  Respirate from Sputum     URINALYSIS CULTURE, IF INDICATED [[id number]]     Order Status:  Sent Specimen:  Urine           Assessment:  Active Hospital Problems    Diagnosis   • *Encephalopathy acute [G93.40]   • Acute respiratory failure with hypoxemia (HCC) [J96.01]   • Seizures (HCC) [R56.9]   • Atelectasis [J98.11]   • Electrolyte abnormality [E87.8]   • Leukocytosis [D72.829]   • Hydradenitis [L73.2]       Plan:  Encephalopathy  Lumbar puncture is not suggestive of bacterial meningitis  This is possibly post ictal since patient is now responsive  Await the CSF viral panel  Will discontinue the Vanco and acyclovir    Respiratory failure  Sputum cultures are MSSA strep pneumo and Haemophilus  Likely colonizers. No clear sign of pneumonia..Procalcitonin is normal as well.  Pansinusitis  Will need treatment  Will continue with the Rocephin for now  Will continue at a lower dose of 2 g every 24 hours    Seizures  New onset  Patient has family history of seizures as well  Management per neurology    Discussed with internal medicine and neurology

## 2018-06-16 NOTE — PROGRESS NOTES
Pt  Transferred back to back to RICU 106 with RN, RT, and transport tech. Pt tolerated Lumbar puncture procedure well. Pt was calm and sedated on propofol.

## 2018-06-16 NOTE — PROGRESS NOTES
Pulmonary Critical Care Progress Note        Admit: 6/13/2018  Date of Service:  6/16/2018  Chief Complaint: confusion and difficulty with ambulation    History of Present Illness: 52 y.o. female with a benign past medical history was admitted for acute unspecified encephalopathy from unknown cause, but had a witnessed generalized tonic-clonic seizure in th ER at [facility] who was admitted to the ICU for critical care management and frequent neurological checks.    Review of Systems   Unable to perform ROS: Intubated     Interval Events:  24 hour interval history reviewed    - sp LP last night and negative results so far   - continuous EEG stopped due to no seizure activity   - waking up and following, but restless   - SR 60-80s   - -140s   - CVP 13   - TFs at goal   - Rucker in place with adequate UOP   - Tmax 100.4   - isolation has been d/c's   - vent day #3   - no SBT this morning yet   - CXR(reviewed): mild bilateral cephalization   - day #3 for vanco/C3/acyclovir   - LP cultures negative so far   - BAL for Strep pneumo, MSSA, H flu    Yesterday's Events:   - became more restless today and not controlled on precedex   - intubated last night   - MRI and CTA done last night   - very agitated and writhing in bed while trying to prep her for continuous EEG   - SR 50-80s   - SBP 90-110s   - Prop 15-20   - Tmax 99.4   - BM prior to admit   - UOP of 550 cc overnight with rucker   - vent day #2   - no SBTs   - CXR(reviewed): clear lung fields   - lovenox/pepcid   - day #2 for vanco/C3/acyclovir    PFSH:  No change.    Physical Exam   Constitutional: She appears well-developed and well-nourished. No distress.   HENT:   Right Ear: External ear normal.   Left Ear: External ear normal.   Nose: Nose normal.   intubated   Eyes: Conjunctivae and EOM are normal. Pupils are equal, round, and reactive to light. No scleral icterus.   Neck: Normal range of motion. Neck supple. No thyromegaly present.   Cardiovascular: Normal rate,  regular rhythm, normal heart sounds and intact distal pulses.    No murmur heard.  Pulmonary/Chest: No respiratory distress. She exhibits no tenderness.   Breathing comfortably on the vent, coarse bases   Abdominal: Soft. Bowel sounds are normal. She exhibits no distension and no mass. There is no tenderness. There is no guarding.   Musculoskeletal: Normal range of motion. She exhibits no edema or tenderness.   Lymphadenopathy:     She has no cervical adenopathy.   Neurological: She displays normal reflexes. No cranial nerve deficit or sensory deficit. Coordination normal.   Sedated/intubated, opening eyes to commands,  =, moving all appropriately   Skin: Skin is dry. Capillary refill takes less than 2 seconds. No rash noted. She is not diaphoretic.   Psychiatric:   Unable to assess   Nursing note and vitals reviewed.      Respiratory:  Car Vent Mode: APVCMV, Rate (breaths/min): 20, Vt Target (mL): 440, PEEP/CPAP: 8, FiO2: 30, Static Compliance (ml / cm H2O): 58.8, Control VTE (exp VT): 436  Pulse Oximetry: 98 %    HemoDynamics:  Pulse: 68, Heart Rate (Monitored): 62  NIBP: 132/66  CVP (mm Hg): (!) 10 MM HG      Recent Labs      06/13/18   1504   TROPONINI  <0.01       Neuro:  GCS Total Monroe Coma Score: 7    CT head (reviewed):  FINDINGS: There is no evidence of mass or mass effect. CSF spaces are normal. There is no periventricular chronic small vessel ischemic change present. There is no intracranial hemorrhage seen. The calvarium is intact. There is no scalp injury. There is bilateral maxillary, ethmoid, frontal and sphenoid sinus disease.    MRI:  1.  No evidence of acute territorial infarct, intracranial hemorrhage or mass lesion.  2.  Mild microangiopathic ischemic change versus demyelination or gliosis.  3.  Pansinusitis.    CTA head:  1.  CT angiogram of the [place] within normal limits.  2.  No dural sinus thrombosis appreciated.  3.  Persistent fetal morphology of the bilateral  posterior cerebral arteries.  4.  Severe pansinusitis  Fluids:  Intake/Output       06/14/18 0700 - 06/15/18 0659 06/15/18 0700 - 06/16/18 0659 06/16/18 0700 - 06/17/18 0659      3988-0860 9500-1382 Total [id number] 1900-0659 Total [id number] 1900-0659 Total       Intake    I.V.  2098.8  3509.5 5608.4  2991.2  2320.6 5311.8  --  -- --    Precedex Volume 8.8 57.2 66.1 -- -- -- -- -- --    Propofol Volume -- 52.3 52.3 281.2 170.6 451.8 -- -- --    IV Piggyback Volume (IV Piggyback) 1090 1200 2290 4424 352 1394 -- -- --    IV Volume (20k 0.9Ns) 1000 1200 2200 1200 1000 2200 -- -- --    IV Volume (LR) -- 1000 1000 -- -- -- -- -- --    IV Volume (NS TKO) -- -- -- 40 200 240 -- -- --    Other  --  -- --  --  30 30  --  -- --    Medications (P.O./ Enteral Liquids) -- -- -- -- 30 30 -- -- --    Enteral  --  -- --  --  340 340  --  -- --    Free Water / Tube Flush -- -- -- -- 90 90 -- -- --    Intake (mL) (Enteral Tube 10 Right Nare) -- -- -- -- 250 250 -- -- --    Total Intake 2098.8 3509.5 5608.4 2991.2 2690.6 5681.8 -- -- --       Output    Urine  --  935 197  122  1109 2330  --  -- --    Number of Times Incontinent of Urine 6 x -- 6 x -- -- -- -- -- --    Output (mL) (Urinary Catheter Indwelling Catheter 16) --  1105 2330 -- -- --    Stool  --  -- --  --  -- --  --  -- --    Number of Times Stooled -- 0 x 0 x -- 0 x 0 x -- -- --    Total Output --  1105 2330 -- -- --       Net I/O     2098.8 2914.5 5013.4 1766.2 1585.6 3351.8 -- -- --           Recent Labs      06/13/18   1504  06/14/18   0548  06/15/18   0441   SODIUM  137  135  137   POTASSIUM  3.5*  4.0  3.7   CHLORIDE  106  105  109   CO2  21  21  19*   BUN  12  8  9   CREATININE  0.82  0.75  0.64   MAGNESIUM  1.8   --   1.5   PHOSPHORUS   --    --   2.4*   CALCIUM  9.4  8.5  7.9*       GI/Nutrition:    Liver Function  Recent Labs      06/13/18   1504  06/14/18   0548  06/15/18   0441   ALTSGPT  27   --    --    ASTSGOT  22   --    --     ALKPHOSPHAT  42   --    --    TBILIRUBIN  0.6   --    --    GLUCOSE  90  118*  90       Heme:  Recent Labs      18   1504  18   0548  06/15/18   0441   RBC  4.15*  4.00*  3.33*   HEMOGLOBIN  13.4  12.8  10.6*   HEMATOCRIT  38.2  36.5*  30.4*   PLATELETCT  245  237  176   PROTHROMBTM  13.4   --    --    APTT  23.4*   --    --    INR  1.05   --    --        Infectious Disease:  Temp  Av.2 °C (98.9 °F)  Min: 36.3 °C (97.3 °F)  Max: 38 °C (100.4 °F)  Micro: reviewed  Recent Labs      18   1504  18   0548  06/15/18   0441   WBC  10.2  14.5*  14.9*   NEUTSPOLYS  64.90   --    --    LYMPHOCYTES  27.30   --    --    MONOCYTES  5.70   --    --    EOSINOPHILS  1.10   --    --    BASOPHILS  0.60   --    --    ASTSGOT  22   --    --    ALTSGPT  27   --    --    ALKPHOSPHAT  42   --    --    TBILIRUBIN  0.6   --    --      Current Facility-Administered Medications   Medication Dose Frequency Provider Last Rate Last Dose   • norepinephrine (LEVOPHED) 8 mg in  mL Infusion  0-30 mcg/min Continuous Puneet Woo M.D.   Stopped at 06/15/18 0145   • ziprasidone (GEODON) capsule 20 mg  20 mg BID PRN Anderson Mendes M.D.       • enoxaparin (LOVENOX) inj 40 mg  40 mg DAILY Alli Torres M.D.       • acetaminophen (TYLENOL) suppository 650 mg  650 mg Q6HRS PRN Puneet Woo M.D.   650 mg at 18 0122   • acyclovir (ZOVIRAX) 660 mg in  mL IVPB  10 mg/kg (Ideal) Q8HRS Anderson Mendes M.D.   Stopped at 06/15/18 2217   • cefTRIAXone (ROCEPHIN) 2 g in  mL IVPB  2 g Q12HRS Anderson Mendes M.D.   Stopped at 06/15/18 2149   • MD ALERT... vancomycin per pharmacy protocol   pharmacy to dose nAderson Mendes M.D.       • vancomycin 1,700 mg in  mL IVPB  1,700 mg Q12HR Alli Torres M.D.   Stopped at 18 0131   • levETIRAcetam (KEPPRA) 1,500 mg in  mL IVPB  1,500 mg Q12HRS Anderson Mendes M.D.   Stopped at 06/15/18 2139   • propofol (DIPRIVAN) injection   0-80 mcg/kg/min Continuous Puneet Woo M.D. 20.1 mL/hr at 06/16/18 0307 40 mcg/kg/min at 06/16/18 0307   • Respiratory Care per Protocol   Continuous RT Puneet Woo M.D.       • senna-docusate (PERICOLACE or SENOKOT S) 8.6-50 MG per tablet 2 Tab  2 Tab BID Puneet Woo M.D.   2 Tab at 06/15/18 2124    And   • polyethylene glycol/lytes (MIRALAX) PACKET 1 Packet  1 Packet QDAY PRN Puneet Woo M.D.        And   • magnesium hydroxide (MILK OF MAGNESIA) suspension 30 mL  30 mL QDAY PRN Puneet Woo M.D.        And   • bisacodyl (DULCOLAX) suppository 10 mg  10 mg QDAY PRN Puneet Woo M.D.       • MD ALERT...Adult ICU Electrolyte Replacement per Pharmacy Protocol   pharmacy to dose Puneet Woo M.D.       • Pharmacy Consult: Enteral tube feeding - review meds/change route/product selection  1 Each PRN Puneet Woo M.D.       • fentaNYL (SUBLIMAZE) injection 25 mcg  25 mcg Q HOUR PRN Puneet Woo M.D.        Or   • fentaNYL (SUBLIMAZE) injection 50 mcg  50 mcg Q HOUR PRN Puneet Woo M.D.   50 mcg at 06/15/18 0953    Or   • fentaNYL (SUBLIMAZE) injection 100 mcg  100 mcg Q HOUR PRN Puneet Woo M.D.       • ipratropium-albuterol (DUONEB) nebulizer solution  3 mL Q2HRS PRN (RT) Puneet Woo M.D.       • insulin regular (HUMULIN R) injection 2-9 Units  2-9 Units Q6HRS Puneet Woo M.D.   Stopped at 06/14/18 1945    And   • glucose 4 g chewable tablet 16 g  16 g Q15 MIN PRN Puneet Woo M.D.        And   • dextrose 50% (D50W) injection 25 mL  25 mL Q15 MIN PRN Puneet P Woo, M.D.       • LORazepam (ATIVAN) injection 2-4 mg  2-4 mg Q HOUR PRN Puneet Woo M.D.       • acetaminophen (TYLENOL) tablet 650 mg  650 mg Q6HRS PRN Maurilio Schumacher M.D.       • ondansetron (ZOFRAN) syringe/vial injection 4 mg  4 mg Q4HRS PRN Maurilio Schumacher M.D.   4 mg at 06/13/18  2046   • ondansetron (ZOFRAN ODT) dispertab 4 mg  4 mg Q4HRS PRN Maurilio Schumacher M.D.       • promethazine (PHENERGAN) tablet 12.5-25 mg  12.5-25 mg Q4HRS PRN Maurilio Schumacher M.D.       • promethazine (PHENERGAN) suppository 12.5-25 mg  12.5-25 mg Q4HRS PRN Maurilio Schumacher M.D.       • prochlorperazine (COMPAZINE) injection 5-10 mg  5-10 mg Q4HRS PRN Maurilio Schumacher M.D.       • 0.9 % NaCl with KCl 20 mEq infusion   Continuous Puneet Woo M.D. 100 mL/hr at 06/16/18 0114       Last reviewed on 6/13/2018  4:43 PM by Pina Thapa, [facility]    Quality  Measures:  Radiology images reviewed, Labs reviewed, Medications reviewed and EKG reviewed  Sam catheter: Critically Ill - Requiring Accurate Measurement of Urinary Output        DVT prophylaxis - mechanical: SCDs  Ulcer prophylaxis: Not indicated          Problems/Plan:    Acute Hypoxic Respiratory Failure   - due to airway protection and to obtain diagnostic studies   - intubated on 6/14   - cont RT/O2 protocols   - cont full vent support   - cont vent bundle protocols   - adjust vent settings based on ABGs/CXRs   - start SBTs today   - s/p bronch with BAL on 6/14 with MSSA, H flu, and strep pneumonia:  Cont C3  Acute Unspecified Encephalopathy   - CT head negative   - UDS negative as well as metabolic panel   - ? Related to seizures   - ? Viral etiology   - q 2 hr neuro checks   - neurology following   - cont ceftriaxone, and ID stopping vanco/acyclovir   - s/p decadron   - CTA head negative   - MRI head negative   - s/p LP 6/15 with viral serologies pending   - ID consulting   - cont daily Geodon  Acute Generalized Tonic-Clonic Seizure   - neurology following   - s/p Keppra 2g load and will continue Keppra 750mg IV BID   - cont seizure precautions   - continuous EEG   - s/p LP 6/15 with viral serologies still pending, but negative bacterial   - negative MRI brain  Pansinusitis   - noted on CT head and MRI brain   - cont  ceftriaxone  Hypokalemia   - repleted  Hypomagnesemia   - repleting  Acute Leukocytosis   -  Improving and likely infectious   - decadron started prior to starting ceftriaxone, vanco, and acyclovir  Prophylaxis   - SCDs    Pt's prognosis still guarded.  She is critically ill with unstable pulmonary and neuro processes on full vent support.  The patient remains at high risk of clinical deterioration, worsening vital organ dysfunction, and death without the above critical care interventions.    Discussed patient condition and risk of morbidity and/or mortality with Family, RN, RT, Therapies, Pharmacy, Dietary, , Charge nurse / hot rounds, Patient and hospitalist.    Critical care time = 35 minutes

## 2018-06-16 NOTE — CARE PLAN
"Problem: Urinary Elimination:  Goal: Ability to reestablish a normal urinary elimination pattern will improve  Outcome: PROGRESSING AS EXPECTED  Sam catheter in place with adequate urinary output    Problem: Pain Management  Goal: Pain level will decrease to patient's comfort goal  Outcome: PROGRESSING AS EXPECTED  CPOT score zero. Pt able to shake head \"no\" when asked about pain      "

## 2018-06-16 NOTE — PROCEDURES
VIDEO ELECTROENCEPHALOGRAM / EPILEPSY MONITORING UNIT REPORT        Referring provider: Dr. Mendes.      DOS:   6/16/2018 (total recording for 2 hours and 15 minutes).      INDICATION:  Malathi York 52 y.o. female presenting with seizures.      CURRENT ANTIEPILEPTIC REGIMEN: Levetiracetam 1500 mg q 12 hrs, Propofol infusion.      TECHNIQUE: A 30-channel, extended video electroencephalogram (VEEG) was performed in accordance with the international 10-20 system. This digital study was reviewed in bipolar and referential montages. The recording examined a sedated patient with intermittent arousals.      DESCRIPTION OF THE RECORD:  Waxing and waning of the cerebral electrical activity, likely secondary to the use of a sedative. Frequent arousals, patient appears to follow some commands. During arousals, there is improvement of the background to 10 HZ posterior alpha rhythm. There are elements of sleep during the study, including symmetric sleep spindles.      ACTIVATION PROCEDURES:   Not performed.      ICTAL AND/OR INTERICTAL FINDINGS:   No focal or generalized epileptiform activity was noted. No regional slowing was seen during this study.  No seizures were recorded during the study.      EVENT(S):  None.      EKG: sampling review of EKG recording demonstrated sinus rhythm.         INTERPRETATION:   This is a normal extended video electroencephalogram recording in a sedated patient with intermittent arousals.  No events or seizures were captured during the study. Clinical correlation is recommended.     Note: A normal electroencephalogram does not rule out epilepsy.      Updates provided to Dr. Mendes.         Marcelo Lofton MD   Epilepsy and Neurodiagnostics.   Clinical  of Neurology [facility].   Diplomate in Neurology, Epilepsy, and Electrodiagnostic Medicine.   Office: [phone]  Fax: [phone]    LILIA HOLGUIN    DD:  06/16/2018 09:21:39  DT:   06/16/2018 09:46:50    D#:  [id number]  Job#:  [id number]

## 2018-06-16 NOTE — EEG PROGRESS NOTE
VIDEO ELECTROENCEPHALOGRAM / EPILEPSY MONITORING UNIT REPORT      Referring provider: Dr. Mendes.     DOS:   6/15/2018 - 6/16/2018 (total recording for 20 hours and 34 minutes).     INDICATION:  Malathi York 52 y.o. female presenting with seizures.     CURRENT ANTIEPILEPTIC REGIMEN: Levetiracetam 1500 mg q 12 hrs, Propofol infusion.      TECHNIQUE: A 30-channel, 24 hrs video electroencephalogram (VEEG) was performed in accordance with the international 10-20 system. This digital study was reviewed in bipolar and referential montages. The recording examined a sedated patient with intermittent arousals.     DESCRIPTION OF THE RECORD:  Waxing and waning of the cerebral electrical activity, likely secondary to the use of a sedative. Frequent arousals, patient appears to follow some commands. During arousals, there is improvement of the background to 10 HZ posterior alpha rhythm. There are elements of sleep during the study, including symmetric sleep spindles.     ACTIVATION PROCEDURES:   Not performed.     ICTAL AND/OR INTERICTAL FINDINGS:   No focal or generalized epileptiform activity was noted. No regional slowing was seen during this study.  No seizures were recorded during the study.     EVENT(S):  None.     EKG: sampling review of EKG recording demonstrated sinus rhythm.       INTERPRETATION:   This is a normal 24 hours video electroencephalogram recording in a sedated patient with intermittent arousals.  No events or seizures were captured during the study. Clinical correlation is recommended.    Note: A normal electroencephalogram does not rule out epilepsy.     Updates provided to Dr. Mendes.       Marcelo Lofton MD   Epilepsy and Neurodiagnostics.   Clinical  of Neurology [facility].   Diplomate in Neurology, Epilepsy, and Electrodiagnostic Medicine.   Office: [phone]  Fax: [phone]

## 2018-06-16 NOTE — PROCEDURES
VIDEO ELECTROENCEPHALOGRAM / EPILEPSY MONITORING UNIT REPORT        Referring provider: Dr. Mendes.      DOS:   6/15/2018 - 6/16/2018 (total recording for 20 hours and 34 minutes).      INDICATION:  Malathi York 52 y.o. female presenting with seizures.      CURRENT ANTIEPILEPTIC REGIMEN: Levetiracetam 1500 mg q 12 hrs, Propofol infusion.      TECHNIQUE: A 30-channel, 24 hrs video electroencephalogram (VEEG) was performed in accordance with the international 10-20 system. This digital study was reviewed in bipolar and referential montages. The recording examined a sedated patient with intermittent arousals.      DESCRIPTION OF THE RECORD:  Waxing and waning of the cerebral electrical activity, likely secondary to the use of a sedative. Frequent arousals, patient appears to follow some commands. During arousals, there is improvement of the background to 10 HZ posterior alpha rhythm. There are elements of sleep during the study, including symmetric sleep spindles.      ACTIVATION PROCEDURES:   Not performed.      ICTAL AND/OR INTERICTAL FINDINGS:   No focal or generalized epileptiform activity was noted. No regional slowing was seen during this study.  No seizures were recorded during the study.      EVENT(S):  None.      EKG: sampling review of EKG recording demonstrated sinus rhythm.         INTERPRETATION:   This is a normal 24 hours video electroencephalogram recording in a sedated patient with intermittent arousals.  No events or seizures were captured during the study. Clinical correlation is recommended.     Note: A normal electroencephalogram does not rule out epilepsy.      Updates provided to Dr. Mendes.         Marcelo Lofton MD   Epilepsy and Neurodiagnostics.   Clinical  of Neurology [facility].   Diplomate in Neurology, Epilepsy, and Electrodiagnostic Medicine.   Office: [phone]  Fax: [phone]  LILIA / CHELSIE    DD:  06/16/2018  09:11:27  DT:  06/16/2018 09:36:14    D#:  [id number]  Job#:  [id number]

## 2018-06-16 NOTE — ASSESSMENT & PLAN NOTE
Respiratory failure was accommodation of seizures and aspiration at this point she has completed antibiotics and at this point is off any oxygen support.

## 2018-06-16 NOTE — PROGRESS NOTES
[facility] Hospitalist Progress Note    Date of Service: 2018    Chief Complaint  52 y.o. female admitted 2018 with encephalopathy and seizure    Interval Problem Update      CEEG with no seizure  Following command  On prop 50  Restless  -140  SR  TF at goal  Intermittent hematuria last night  Tmax 100.4  BAL MSSA H infl strep         Consultants/Specialty  Neurology  Critical care    Disposition  ICU        Review of Systems   Unable to perform ROS: Mental status change      Physical Exam  Laboratory/Imaging   Hemodynamics  Temp (24hrs), Av.1 °C (98.8 °F), Min:36.3 °C (97.3 °F), Max:38 °C (100.4 °F)   Temperature: 37 °C (98.6 °F)  Pulse  Av.7  Min: 50  Max: 119 Heart Rate (Monitored): 83  NIBP: 134/65 CVP (mm Hg): (!) 250 MM HG    Respiratory  Car Vent Mode: APVCMV, Rate (breaths/min): 20, PEEP/CPAP: 8, PEEP/CPAP: 8, FiO2: 30, P Peak (PIP): 18, P MEAN: 11   Respiration: (!) 22, Pulse Oximetry: 96 %     Work Of Breathing / Effort: Vented  RUL Breath Sounds: Crackles, RML Breath Sounds: Diminished, RLL Breath Sounds: Diminished, WENDY Breath Sounds: Crackles, LLL Breath Sounds: Diminished    Fluids    Intake/Output Summary (Last 24 hours) at 18 0953  Last data filed at 18 0600   Gross per 24 hour   Intake          5874.82 ml   Output             2580 ml   Net          3294.82 ml       Nutrition  Orders Placed This Encounter   Procedures   • Diet NPO     Standing Status:   Standing     Number of Occurrences:   1     Order Specific Question:   Type:     Answer:   Now [1]     Order Specific Question:   Restrict to:     Answer:   Strict [1]     Physical Exam   Constitutional: She appears well-developed and well-nourished.   HENT:   Head: Normocephalic and atraumatic.   Right Ear: External ear normal.   Left Ear: External ear normal.   Mouth/Throat: No oropharyngeal exudate.   Eyes: Conjunctivae are normal. Pupils are equal, round, and reactive to light. Right eye exhibits no  discharge. Left eye exhibits no discharge. No scleral icterus.   Neck: Neck supple. No JVD present. No tracheal deviation present.   Cardiovascular: Regular rhythm.  Exam reveals no gallop and no friction rub.    No murmur heard.  Tachycardia   Pulmonary/Chest: Effort normal and breath sounds normal. No respiratory distress. She has no wheezes. She has no rales. She exhibits no tenderness.   Intubated   Abdominal: Soft. Bowel sounds are normal. She exhibits no distension. There is no tenderness. There is no rebound.   Musculoskeletal: She exhibits no edema, tenderness or deformity.   Neurological: No cranial nerve deficit. She exhibits normal muscle tone.   Exam limited by sedation   Skin: Skin is warm and dry. No rash noted. She is not diaphoretic. No cyanosis or erythema. Nails show no clubbing.   Psychiatric:   Sedated   Nursing note and vitals reviewed.      Recent Labs      06/14/18   0548  06/15/18   0441  06/16/18   0621   WBC  14.5*  14.9*  8.9   RBC  4.00*  3.33*  3.18*   HEMOGLOBIN  12.8  10.6*  10.3*   HEMATOCRIT  36.5*  30.4*  29.4*   MCV  91.3  91.3  92.5   MCH  32.0  31.8  32.4   MCHC  35.1*  34.9  35.0   RDW  48.5  49.1  50.5*   PLATELETCT  237  176  157*   MPV  8.4*  8.9*  8.8*     Recent Labs      06/14/18   0548  06/15/18   0441  06/16/18   0621   SODIUM  135  137  139   POTASSIUM  4.0  3.7  3.6   CHLORIDE  105  109  112   CO2  21  19*  20   GLUCOSE  118*  90  103*   BUN  8  9  8   CREATININE  0.75  0.64  0.68   CALCIUM  8.5  7.9*  8.1*     Recent Labs      06/13/18   1504   APTT  23.4*   INR  1.05                  Assessment/Plan     * Encephalopathy acute   Assessment & Plan    Clinically suspect viral encephalitis/meningitis  Follow-up on viral serology and PCR  Pansinusitis noted on MRI continue ceftriaxone  BAL results ?  Colonizerversus sinusitis  Continue ceftriaxone  Discussed with Dr. Sommer and Dr. Butler          Acute respiratory failure with hypoxemia (HCC)   Assessment & Plan     Vent management per critical care discussed with Dr. Butler  SBT as tolerated        Seizures (HCC)   Assessment & Plan    Continue Keppra  No recurrent seizures on EEG        Electrolyte abnormality   Assessment & Plan    Hypokalemia  Hypomagnesemia  Replete and monitor            Hydradenitis   Assessment & Plan    No signs of active infection at this time          Quality-Core Measures   Reviewed items::  Labs reviewed, Medications reviewed and Radiology images reviewed  Sam catheter::  Critically Ill - Requiring Accurate Measurement of Urinary Output  DVT prophylaxis pharmacological::  Enoxaparin (Lovenox)  DVT prophylaxis - mechanical:  SCDs  Ulcer Prophylaxis::  Yes  Antibiotics:  Treating active infection/contamination beyond 24 hours perioperative coverage      Plan of care reviewed with patient's  and family at bedside and the questions answered also discussed with nursing staff infectious disease and critical care

## 2018-06-16 NOTE — EEG PROGRESS NOTE
VIDEO ELECTROENCEPHALOGRAM / EPILEPSY MONITORING UNIT REPORT        Referring provider: Dr. Mendes.      DOS:   6/16/2018 (total recording for 2 hours and 15 minutes).      INDICATION:  Malathi York 52 y.o. female presenting with seizures.      CURRENT ANTIEPILEPTIC REGIMEN: Levetiracetam 1500 mg q 12 hrs, Propofol infusion.      TECHNIQUE: A 30-channel, extended video electroencephalogram (VEEG) was performed in accordance with the international 10-20 system. This digital study was reviewed in bipolar and referential montages. The recording examined a sedated patient with intermittent arousals.      DESCRIPTION OF THE RECORD:  Waxing and waning of the cerebral electrical activity, likely secondary to the use of a sedative. Frequent arousals, patient appears to follow some commands. During arousals, there is improvement of the background to 10 HZ posterior alpha rhythm. There are elements of sleep during the study, including symmetric sleep spindles.      ACTIVATION PROCEDURES:   Not performed.      ICTAL AND/OR INTERICTAL FINDINGS:   No focal or generalized epileptiform activity was noted. No regional slowing was seen during this study.  No seizures were recorded during the study.      EVENT(S):  None.      EKG: sampling review of EKG recording demonstrated sinus rhythm.         INTERPRETATION:   This is a normal extended video electroencephalogram recording in a sedated patient with intermittent arousals.  No events or seizures were captured during the study. Clinical correlation is recommended.     Note: A normal electroencephalogram does not rule out epilepsy.      Updates provided to Dr. Mendes.         Marcelo Lofton MD   Epilepsy and Neurodiagnostics.   Clinical  of Neurology [facility].   Diplomate in Neurology, Epilepsy, and Electrodiagnostic Medicine.   Office: [phone]  Fax: [phone]

## 2018-06-16 NOTE — PROGRESS NOTES
Pt transported to radiology for lumbar puncture procedure via bed, cardiac monitor, transport vent, iv meds, RN, Rt, and transport tech. EEG box unplugged at 1500.

## 2018-06-17 ENCOUNTER — APPOINTMENT (OUTPATIENT)
Dept: RADIOLOGY | Facility: MEDICAL CENTER | Age: 53
DRG: 987 | End: 2018-06-17
Attending: INTERNAL MEDICINE
Payer: COMMERCIAL

## 2018-06-17 LAB
ACTION RANGE TRIGGERED IACRT: NO
ANION GAP SERPL CALC-SCNC: 7 MMOL/L (ref 0–11.9)
BASE EXCESS BLDA CALC-SCNC: -4 MMOL/L (ref -4–3)
BODY TEMPERATURE: ABNORMAL DEGREES
BUN SERPL-MCNC: 10 MG/DL (ref 8–22)
C GATTII+NEOFOR DNA CSF QL NAA+NON-PROBE: NOT DETECTED
CALCIUM SERPL-MCNC: 8.6 MG/DL (ref 8.5–10.5)
CHLORIDE SERPL-SCNC: 112 MMOL/L (ref 96–112)
CMV DNA CSF QL NAA+NON-PROBE: NOT DETECTED
CO2 BLDA-SCNC: 20 MMOL/L (ref 20–33)
CO2 SERPL-SCNC: 21 MMOL/L (ref 20–33)
CREAT SERPL-MCNC: 0.62 MG/DL (ref 0.5–1.4)
E COLI K1 DNA CSF QL NAA+NON-PROBE: NOT DETECTED
ERYTHROCYTE [DISTWIDTH] IN BLOOD BY AUTOMATED COUNT: 50.6 FL (ref 35.9–50)
EV RNA CSF QL NAA+NON-PROBE: NOT DETECTED
GLUCOSE BLD-MCNC: 76 MG/DL (ref 65–99)
GLUCOSE SERPL-MCNC: 113 MG/DL (ref 65–99)
GP B STREP DNA CSF QL NAA+NON-PROBE: NOT DETECTED
HAEM INFLU DNA CSF QL NAA+NON-PROBE: NOT DETECTED
HCO3 BLDA-SCNC: 19.3 MMOL/L (ref 17–25)
HCT VFR BLD AUTO: 31.2 % (ref 37–47)
HGB BLD-MCNC: 10.7 G/DL (ref 12–16)
HHV6 DNA CSF QL NAA+NON-PROBE: NOT DETECTED
HSV1 DNA CSF QL NAA+NON-PROBE: NOT DETECTED
HSV2 DNA CSF QL NAA+NON-PROBE: NOT DETECTED
INST. QUALIFIED PATIENT IIQPT: YES
L MONOCYTOG DNA CSF QL NAA+NON-PROBE: NOT DETECTED
MAGNESIUM SERPL-MCNC: 1.6 MG/DL (ref 1.5–2.5)
MCH RBC QN AUTO: 31.8 PG (ref 27–33)
MCHC RBC AUTO-ENTMCNC: 34.3 G/DL (ref 33.6–35)
MCV RBC AUTO: 92.9 FL (ref 81.4–97.8)
N MEN DNA CSF QL NAA+NON-PROBE: NOT DETECTED
O2/TOTAL GAS SETTING VFR VENT: 30 %
PARECHOVIRUS A RNA CSF QL NAA+NON-PROBE: NOT DETECTED
PCO2 BLDA: 29.8 MMHG (ref 26–37)
PCO2 TEMP ADJ BLDA: 29.8 MMHG (ref 26–37)
PH BLDA: 7.42 [PH] (ref 7.4–7.5)
PH TEMP ADJ BLDA: 7.42 [PH] (ref 7.4–7.5)
PHOSPHATE SERPL-MCNC: 3.4 MG/DL (ref 2.5–4.5)
PLATELET # BLD AUTO: 184 K/UL (ref 164–446)
PMV BLD AUTO: 8.8 FL (ref 9–12.9)
PO2 BLDA: 92 MMHG (ref 64–87)
PO2 TEMP ADJ BLDA: 92 MMHG (ref 64–87)
POTASSIUM SERPL-SCNC: 3.6 MMOL/L (ref 3.6–5.5)
RBC # BLD AUTO: 3.36 M/UL (ref 4.2–5.4)
S PNEUM DNA CSF QL NAA+NON-PROBE: NOT DETECTED
SAO2 % BLDA: 97 % (ref 93–99)
SODIUM SERPL-SCNC: 140 MMOL/L (ref 135–145)
SPECIMEN DRAWN FROM PATIENT: ABNORMAL
VIT B1 BLD-MCNC: 134 NMOL/L (ref 70–180)
VZV DNA CSF QL NAA+NON-PROBE: NOT DETECTED
WBC # BLD AUTO: 7.6 K/UL (ref 4.8–10.8)

## 2018-06-17 PROCEDURE — 83735 ASSAY OF MAGNESIUM: CPT

## 2018-06-17 PROCEDURE — 85027 COMPLETE CBC AUTOMATED: CPT

## 2018-06-17 PROCEDURE — 700102 HCHG RX REV CODE 250 W/ 637 OVERRIDE(OP): Performed by: HOSPITALIST

## 2018-06-17 PROCEDURE — 82803 BLOOD GASES ANY COMBINATION: CPT

## 2018-06-17 PROCEDURE — 700111 HCHG RX REV CODE 636 W/ 250 OVERRIDE (IP): Performed by: PSYCHIATRY & NEUROLOGY

## 2018-06-17 PROCEDURE — 700102 HCHG RX REV CODE 250 W/ 637 OVERRIDE(OP): Performed by: INTERNAL MEDICINE

## 2018-06-17 PROCEDURE — 36600 WITHDRAWAL OF ARTERIAL BLOOD: CPT

## 2018-06-17 PROCEDURE — 700111 HCHG RX REV CODE 636 W/ 250 OVERRIDE (IP): Performed by: HOSPITALIST

## 2018-06-17 PROCEDURE — A9270 NON-COVERED ITEM OR SERVICE: HCPCS | Performed by: INTERNAL MEDICINE

## 2018-06-17 PROCEDURE — 80048 BASIC METABOLIC PNL TOTAL CA: CPT

## 2018-06-17 PROCEDURE — 700101 HCHG RX REV CODE 250: Performed by: INTERNAL MEDICINE

## 2018-06-17 PROCEDURE — 84100 ASSAY OF PHOSPHORUS: CPT

## 2018-06-17 PROCEDURE — 71045 X-RAY EXAM CHEST 1 VIEW: CPT

## 2018-06-17 PROCEDURE — 700111 HCHG RX REV CODE 636 W/ 250 OVERRIDE (IP): Performed by: INTERNAL MEDICINE

## 2018-06-17 PROCEDURE — 94150 VITAL CAPACITY TEST: CPT

## 2018-06-17 PROCEDURE — 700105 HCHG RX REV CODE 258: Performed by: INTERNAL MEDICINE

## 2018-06-17 PROCEDURE — 99291 CRITICAL CARE FIRST HOUR: CPT | Performed by: INTERNAL MEDICINE

## 2018-06-17 PROCEDURE — 99233 SBSQ HOSP IP/OBS HIGH 50: CPT | Performed by: INTERNAL MEDICINE

## 2018-06-17 PROCEDURE — 99233 SBSQ HOSP IP/OBS HIGH 50: CPT | Performed by: HOSPITALIST

## 2018-06-17 PROCEDURE — 700105 HCHG RX REV CODE 258: Performed by: PSYCHIATRY & NEUROLOGY

## 2018-06-17 PROCEDURE — 770022 HCHG ROOM/CARE - ICU (200)

## 2018-06-17 PROCEDURE — A9270 NON-COVERED ITEM OR SERVICE: HCPCS | Performed by: HOSPITALIST

## 2018-06-17 PROCEDURE — 94003 VENT MGMT INPAT SUBQ DAY: CPT

## 2018-06-17 RX ORDER — OXYCODONE HYDROCHLORIDE 5 MG/1
5 TABLET ORAL EVERY 6 HOURS PRN
Status: DISCONTINUED | OUTPATIENT
Start: 2018-06-17 | End: 2018-06-19

## 2018-06-17 RX ADMIN — OXYCODONE HYDROCHLORIDE 5 MG: 5 TABLET ORAL at 21:55

## 2018-06-17 RX ADMIN — ACETAMINOPHEN 650 MG: 325 TABLET, FILM COATED ORAL at 12:01

## 2018-06-17 RX ADMIN — OXYCODONE HYDROCHLORIDE 5 MG: 5 TABLET ORAL at 14:31

## 2018-06-17 RX ADMIN — PROPOFOL 60 MCG/KG/MIN: 10 INJECTION, EMULSION INTRAVENOUS at 01:03

## 2018-06-17 RX ADMIN — FAMOTIDINE 20 MG: 20 TABLET ORAL at 09:59

## 2018-06-17 RX ADMIN — PROPOFOL 40 MCG/KG/MIN: 10 INJECTION, EMULSION INTRAVENOUS at 04:58

## 2018-06-17 RX ADMIN — SODIUM CHLORIDE 1500 MG: 9 INJECTION, SOLUTION INTRAVENOUS at 20:56

## 2018-06-17 RX ADMIN — CEFTRIAXONE 2 G: 2 INJECTION, POWDER, FOR SOLUTION INTRAMUSCULAR; INTRAVENOUS at 09:59

## 2018-06-17 RX ADMIN — POTASSIUM CHLORIDE AND SODIUM CHLORIDE: 900; 150 INJECTION, SOLUTION INTRAVENOUS at 06:04

## 2018-06-17 RX ADMIN — ENOXAPARIN SODIUM 40 MG: 100 INJECTION SUBCUTANEOUS at 09:59

## 2018-06-17 RX ADMIN — DOCUSATE SODIUM AND SENNOSIDES 2 TABLET: 8.6; 5 TABLET, FILM COATED ORAL at 09:59

## 2018-06-17 RX ADMIN — ACETAMINOPHEN 650 MG: 325 TABLET, FILM COATED ORAL at 18:05

## 2018-06-17 RX ADMIN — SODIUM CHLORIDE 1500 MG: 9 INJECTION, SOLUTION INTRAVENOUS at 09:59

## 2018-06-17 ASSESSMENT — ENCOUNTER SYMPTOMS
EYE PAIN: 0
NERVOUS/ANXIOUS: 0
DIARRHEA: 0
VOMITING: 0
BLURRED VISION: 0
COUGH: 0
SEIZURES: 0
EYE REDNESS: 0
BLOOD IN STOOL: 0
FOCAL WEAKNESS: 0
HEADACHES: 0
ABDOMINAL PAIN: 0
EYE DISCHARGE: 0
STRIDOR: 0
LOSS OF CONSCIOUSNESS: 0
FEVER: 0
BACK PAIN: 0
NAUSEA: 0
WEAKNESS: 0
FALLS: 0
PALPITATIONS: 0
NECK PAIN: 0
FLANK PAIN: 0
MYALGIAS: 0
SPEECH CHANGE: 0
SPUTUM PRODUCTION: 0
HEMOPTYSIS: 0
MEMORY LOSS: 0
HALLUCINATIONS: 0
HEADACHES: 1
SENSORY CHANGE: 0
SHORTNESS OF BREATH: 0
ORTHOPNEA: 0

## 2018-06-17 ASSESSMENT — PAIN SCALES - GENERAL
PAINLEVEL_OUTOF10: 5
PAINLEVEL_OUTOF10: 2
PAINLEVEL_OUTOF10: 7
PAINLEVEL_OUTOF10: 4
PAINLEVEL_OUTOF10: 3
PAINLEVEL_OUTOF10: 6
PAINLEVEL_OUTOF10: 7
PAINLEVEL_OUTOF10: 4

## 2018-06-17 ASSESSMENT — PULMONARY FUNCTION TESTS: FVC: 1.1

## 2018-06-17 ASSESSMENT — LIFESTYLE VARIABLES
SUBSTANCE_ABUSE: 0
EVER_SMOKED: NEVER

## 2018-06-17 NOTE — PROGRESS NOTES
"S- I am doing great.  No other complaints.     O-   Allergies:   Allergies   Allergen Reactions   • Penicillins Unspecified     \"childhood\"  6/14/18: Patient tolerates cephalosporins         Current Facility-Administered Medications:   •  cefTRIAXone (ROCEPHIN) 2 g in  mL IVPB, 2 g, Intravenous, Q24HRS, Naina Sommer M.D., Stopped at 06/17/18 1029  •  famotidine (PEPCID) tablet 20 mg, 20 mg, Oral, BID, 20 mg at 06/17/18 0959 **OR** famotidine (PEPCID) injection 20 mg, 20 mg, Intravenous, BID, Shakira Butler M.D., 20 mg at 06/16/18 2058  •  ziprasidone (GEODON) capsule 20 mg, 20 mg, Oral, BID PRN, Anderson Mendes M.D.  •  enoxaparin (LOVENOX) inj 40 mg, 40 mg, Subcutaneous, DAILY, Alli Torres M.D., 40 mg at 06/17/18 0959  •  acetaminophen (TYLENOL) suppository 650 mg, 650 mg, Rectal, Q6HRS PRN, Puneet Woo M.D., 650 mg at 06/14/18 0122  •  levETIRAcetam (KEPPRA) 1,500 mg in  mL IVPB, 1,500 mg, Intravenous, Q12HRS, Anderson Mendes M.D., Stopped at 06/17/18 1014  •  Respiratory Care per Protocol, , Nebulization, Continuous RT, Puneet Woo M.D.  •  senna-docusate (PERICOLACE or SENOKOT S) 8.6-50 MG per tablet 2 Tab, 2 Tab, Oral, BID, 2 Tab at 06/17/18 0959 **AND** polyethylene glycol/lytes (MIRALAX) PACKET 1 Packet, 1 Packet, Oral, QDAY PRN, 1 Packet at 06/16/18 2059 **AND** magnesium hydroxide (MILK OF MAGNESIA) suspension 30 mL, 30 mL, Oral, QDAY PRN **AND** bisacodyl (DULCOLAX) suppository 10 mg, 10 mg, Rectal, QDAY PRN, Puneet Woo M.D.  •  MD ALERT...Adult ICU Electrolyte Replacement per Pharmacy Protocol, , Other, pharmacy to dose, Puneet Woo M.D.  •  Action is required: Protocol 751 Tube Feeding Enteral Feeding has been implemented, , , Once **AND** Protocol 751 Document tube feeding in I&O doc flow sheet, , , CONTINUOUS **AND** Protocol 751 elevate HOB, , , CONTINUOUS **AND** Protocol 751 Nutrition (Dietary) Consult, , , Once **AND** [START ON " 6/18/2018] Weigh Patient, , , MO & TH **AND** Protocol 751 Tube Placement, , , CONTINUOUS **AND** [CANCELED] Protocol 751 Diet Tube Feeding, , , Continuous **AND** Protocol 751 Instructions, , , CONTINUOUS **AND** Protocol 751 Goal Rate, , , CONTINUOUS **AND** Protocol 751 Start Rate, , , CONTINUOUS **AND** Protocol 751 Order to be Separately Placed by RN, , , CONTINUOUS **AND** [START ON 6/18/2018] CRP Quantitative (Non-Cardiac), , , EVERY MONDAY (0300) **AND** [START ON 6/18/2018] Prealbumin, , , EVERY MONDAY (0300) **AND** Pharmacy Consult: Enteral tube feeding - review meds/change route/product selection, 1 Each, Other, PRN **AND** Protocol 751 Nursing Communication Tube Occlusion, , , CONTINUOUS **AND** Protocol 751 Tube Maintenance, , , PRN, Puneet Woo M.D.  •  fentaNYL (SUBLIMAZE) injection 25 mcg, 25 mcg, Intravenous, Q HOUR PRN **OR** fentaNYL (SUBLIMAZE) injection 50 mcg, 50 mcg, Intravenous, Q HOUR PRN, 50 mcg at 06/15/18 0953 **OR** fentaNYL (SUBLIMAZE) injection 100 mcg, 100 mcg, Intravenous, Q HOUR PRN, Puneet Woo M.D.  •  ipratropium-albuterol (DUONEB) nebulizer solution, 3 mL, Nebulization, Q2HRS PRN (RT), Puneet Woo M.D.  •  LORazepam (ATIVAN) injection 2-4 mg, 2-4 mg, Intravenous, Q HOUR PRN, Puneet Woo M.D.  •  acetaminophen (TYLENOL) tablet 650 mg, 650 mg, Oral, Q6HRS PRN, Maurilio Schumacher M.D.  •  ondansetron (ZOFRAN) syringe/vial injection 4 mg, 4 mg, Intravenous, Q4HRS PRN, Maurilio Schumacher M.D., 4 mg at 06/13/18 2046  •  ondansetron (ZOFRAN ODT) dispertab 4 mg, 4 mg, Oral, Q4HRS PRN, Maurilio Schumacher M.D.  •  promethazine (PHENERGAN) tablet 12.5-25 mg, 12.5-25 mg, Oral, Q4HRS PRN, Maurilio Schumacher M.D.  •  promethazine (PHENERGAN) suppository 12.5-25 mg, 12.5-25 mg, Rectal, Q4HRS PRN, Maurilio Schumacher M.D.  •  prochlorperazine (COMPAZINE) injection 5-10 mg, 5-10 mg, Intravenous, Q4HRS PRN, Maurilio Schumacher  M.D.      PHYSICAL EXAM    Vitals:    06/17/18 0732 06/17/18 0800 06/17/18 0842 06/17/18 0900   BP:       Pulse:  67  69   Resp:  (!) 33  (!) 32   Temp:  37.1 °C (98.8 °F)     SpO2: 97% 97% 98% 97%   Weight:       Height:           Head/Neck: NCAT no meningismus neg kernig neg brudzinski. No obvious mass or heard bruit. No tender arteries or lost pulses.  Skin: Warm, dry, intact. No rashes observed head/neck or body  Eyes/Funduscopic: unable    Mental Status: Awake, alert, oriented x 3. Names/repeats/fluent/follows commands. No neglect/extinction. Attention and concentration, recent & remote memory, Fund of Knowledge wnl.  Cranial Nerves: CN II-XII intact. PERRL 3mm.   No afferent pupillary defect. EOM full. VF full. sym grimace & eye closure. No nystagmus.     Motor: strength/bulk/tone wnl.  intact and sym, no abn mvmts   Sensory: Intact light touch & sharp  Coordination: finger to nose & heel to shin intact.   DTR's: intact/sym. no clonus. Toes mute.  Gait/Station: n/a fall concern         Labs:  Recent Labs      06/15/18   0441  06/16/18   0621  06/17/18   0530   WBC  14.9*  8.9  7.6   RBC  3.33*  3.18*  3.36*   HEMOGLOBIN  10.6*  10.3*  10.7*   HEMATOCRIT  30.4*  29.4*  31.2*   MCV  91.3  92.5  92.9   MCH  31.8  32.4  31.8   MCHC  34.9  35.0  34.3   RDW  49.1  50.5*  50.6*   PLATELETCT  176  157*  184   MPV  8.9*  8.8*  8.8*     Recent Labs      06/15/18   0441  06/16/18   0621  06/17/18   0530   SODIUM  137  139  140   POTASSIUM  3.7  3.6  3.6   CHLORIDE  109  112  112   CO2  19*  20  21   GLUCOSE  90  103*  113*   BUN  9  8  10   CREATININE  0.64  0.68  0.62   CALCIUM  7.9*  8.1*  8.6                     Recent Labs      06/15/18   0441  06/16/18   0621  06/17/18   0530   SODIUM  137  139  140   POTASSIUM  3.7  3.6  3.6   CHLORIDE  109  112  112   CO2  19*  20  21   GLUCOSE  90  103*  113*   BUN  9  8  10     Recent Labs      06/15/18   0441  06/16/18   0621  06/17/18   0530   SODIUM  137  139  140   POTASSIUM   3.7  3.6  3.6   CHLORIDE  109  112  112   CO2  19*  20  21   BUN  9  8  10   CREATININE  0.64  0.68  0.62   MAGNESIUM  1.5  1.6  1.6   PHOSPHORUS  2.4*  2.5  3.4   CALCIUM  7.9*  8.1*  8.6         No results found for this or any previous visit.           Imaging: neuroimaging reviewed and directly visualized by me  DX-CHEST-PORTABLE (1 VIEW)   Final Result         1.  Bibasilar atelectasis or infiltrate         DX-CHEST-PORTABLE (1 VIEW)   Final Result         1.  Bibasilar atelectasis, no focal infiltrate is identified      DX-LUMBAR PUNCTURE FOR DIAGNOSIS   Final Result      Fluoroscopic-guided lumbar puncture as described above.      DX-ABDOMEN FOR TUBE PLACEMENT   Final Result      Feeding tube tip at the distal stomach/pylorus.      MR-BRAIN-WITH & W/O   Final Result      1.  No evidence of acute territorial infarct, intracranial hemorrhage or mass lesion.   2.  Mild microangiopathic ischemic change versus demyelination or gliosis.   3.  Pansinusitis.      DX-CHEST-PORTABLE (1 VIEW)   Final Result         1.  Bibasilar atelectasis, no focal infiltrate is identified      CT-CTA HEAD WITH & W/O-POST PROCESS   Final Result         1.  CT angiogram of the "[employer] of [place] within normal limits.   2.  No dural sinus thrombosis appreciated.   3.  Persistent fetal morphology of the bilateral posterior cerebral arteries.   4.  Severe pansinusitis      DX-CHEST-LIMITED (1 VIEW)   Final Result      1.  Supportive tubing as described above.   2.  No pneumothorax.   3.  Hypoinflation with worsening bibasilar atelectasis.      DX-CHEST-LIMITED (1 VIEW)   Final Result         No acute cardiac or pulmonary abnormality is identified.      CT-HEAD W/O   Final Result      No evidence of acute intracranial process.          Assessment/Plan:    RESOLVED GTC SEIZURES W/ SEVERE POST ICTAL CONFUSION/AGITATION, AMS PRIOR LIKELY CPS WITH STARING SPELLS     ENCEPHALOPATHY, RESOLVED, LIKELY POST ICTAL     SZ PRECAUTIONS     KEPPRA  1500/1500     GEODON OR SEROQUEL FOR PSYCHOSIS/AGITATION, AVOID HIGH POTENCY ANTIPSYCHOTICS      ENT CONSULT FOR SEVERE SINUSITIS R/O FUNGAL WITH EXPOSURES     ID CONSULT APPRECIATED; CSF DOES NOT SUGGEST INFECTION     Discussed seizure hygiene, triggers, and AED compliance/side effects/teratogenicity      Report to [place] for driving restriction; advised no activities where LOC a danger until neuro MD clears as otpt    Give rectal diazepam script for emergency on discharge       Neuro sign off, reconsult PRN.  F/u otpt Neuro ASAP.         Anderson Mendes M.D.  , Neurohospitalist & Stroke  Clinical Professor, [employer]  Diplomate, Neurology & Neuroimaging

## 2018-06-17 NOTE — PROGRESS NOTES
[facility] Hospitalist Progress Note    Date of Service: 2018    Chief Complaint  52 y.o. female admitted 2018 with encephalopathy and seizure    Interval Problem Update      Extubated this morning  No seizure activity  TF at goal  Good UO  Off propofol\  Afebrile              Consultants/Specialty  Neurology  Critical care    Disposition  ICU        Review of Systems   Unable to perform ROS: Mental status change   Constitutional: Positive for malaise/fatigue. Negative for fever.   HENT: Negative for congestion, ear discharge and nosebleeds.    Eyes: Negative for blurred vision, pain, discharge and redness.   Respiratory: Negative for cough, hemoptysis, sputum production, shortness of breath and stridor.    Cardiovascular: Negative for chest pain, palpitations, orthopnea and leg swelling.   Gastrointestinal: Negative for abdominal pain, blood in stool, diarrhea, melena, nausea and vomiting.   Genitourinary: Negative for dysuria, flank pain and hematuria.   Musculoskeletal: Positive for joint pain. Negative for back pain, falls and neck pain.   Skin: Negative.    Neurological: Positive for headaches. Negative for speech change, focal weakness, seizures, loss of consciousness and weakness.   Psychiatric/Behavioral: Negative for hallucinations, memory loss and substance abuse. The patient is not nervous/anxious.    All other systems reviewed and are negative.     Physical Exam  Laboratory/Imaging   Hemodynamics  Temp (24hrs), Av.1 °C (98.7 °F), Min:36.7 °C (98.1 °F), Max:37.2 °C (99 °F)   Temperature: 37.1 °C (98.8 °F)  Pulse  Av  Min: 50  Max: 119 Heart Rate (Monitored): 68  NIBP: 128/74 CVP (mm Hg): (!) 12 MM HG    Respiratory  Car Vent Mode: Spont, Rate (breaths/min): 20, PEEP/CPAP: 8, PEEP/CPAP: 8, FiO2: 30, P Peak (PIP): 21, P MEAN: 12   Respiration: (!) 32, Pulse Oximetry: 97 %     Work Of Breathing / Effort: Vented  RUL Breath Sounds: Coarse Crackles, RML Breath Sounds: Diminished, RLL Breath  Sounds: Diminished, WENDY Breath Sounds: Coarse Crackles, LLL Breath Sounds: Diminished    Fluids    Intake/Output Summary (Last 24 hours) at 06/17/18 0921  Last data filed at 06/17/18 0800   Gross per 24 hour   Intake          4411.53 ml   Output             4645 ml   Net          -233.47 ml       Nutrition  Orders Placed This Encounter   Procedures   • Diet NPO     Standing Status:   Standing     Number of Occurrences:   1     Order Specific Question:   Type:     Answer:   Now [1]     Order Specific Question:   Restrict to:     Answer:   Strict [1]     Physical Exam   Constitutional: She is oriented to person, place, and time. She appears well-developed and well-nourished.   HENT:   Head: Normocephalic and atraumatic.   Right Ear: External ear normal.   Left Ear: External ear normal.   Mouth/Throat: No oropharyngeal exudate.   Eyes: Conjunctivae are normal. Right eye exhibits no discharge. Left eye exhibits no discharge. No scleral icterus.   Neck: Neck supple. No JVD present. No tracheal deviation present.   Cardiovascular: Normal rate and regular rhythm.  Exam reveals no gallop and no friction rub.    No murmur heard.  Pulmonary/Chest: Effort normal and breath sounds normal. No stridor. No respiratory distress. She has no wheezes. She has no rales. She exhibits no tenderness.   Abdominal: Soft. Bowel sounds are normal. She exhibits no distension and no mass. There is no tenderness. There is no rebound and no guarding.   Musculoskeletal: She exhibits no edema or tenderness.   Neurological: She is alert and oriented to person, place, and time. No cranial nerve deficit. She exhibits normal muscle tone.   Skin: Skin is warm and dry. She is not diaphoretic. No cyanosis. Nails show no clubbing.   Psychiatric: She has a normal mood and affect. Thought content normal. She is slowed.   Nursing note and vitals reviewed.      Recent Labs      06/15/18   0441  06/16/18   0621  06/17/18   0530   WBC  14.9*  8.9  7.6   RBC   3.33*  3.18*  3.36*   HEMOGLOBIN  10.6*  10.3*  10.7*   HEMATOCRIT  30.4*  29.4*  31.2*   MCV  91.3  92.5  92.9   MCH  31.8  32.4  31.8   MCHC  34.9  35.0  34.3   RDW  49.1  50.5*  50.6*   PLATELETCT  176  157*  184   MPV  8.9*  8.8*  8.8*     Recent Labs      06/15/18   0441  06/16/18   0621  06/17/18   0530   SODIUM  137  139  140   POTASSIUM  3.7  3.6  3.6   CHLORIDE  109  112  112   CO2  19*  20  21   GLUCOSE  90  103*  113*   BUN  9  8  10   CREATININE  0.64  0.68  0.62   CALCIUM  7.9*  8.1*  8.6                      Assessment/Plan     * Encephalopathy acute   Assessment & Plan    ?  Postictal versus  viral encephalitis/meningitis  Follow-up on viral serology and PCR  Pansinusitis noted on MRI continue ceftriaxone and transitioned to p.o. Levaquin  Discussed with ID          Acute respiratory failure with hypoxemia (HCC)   Assessment & Plan    Extubated 6/17/2018  RT protocol  Aspiration precautions  SLP eval and diet per speech        Seizures (HCC)   Assessment & Plan    Continue Keppra  No recurrent seizures on EEG  Will need outpatient follow-up with neurology        Electrolyte abnormality   Assessment & Plan    Hypokalemia  Hypomagnesemia    Replete potassium and magnesium            Hydradenitis   Assessment & Plan    No signs of active infection at this time          Quality-Core Measures   Reviewed items::  Labs reviewed, Medications reviewed and Radiology images reviewed  Sam catheter::  Critically Ill - Requiring Accurate Measurement of Urinary Output  DVT prophylaxis pharmacological::  Enoxaparin (Lovenox)  DVT prophylaxis - mechanical:  SCDs  Ulcer Prophylaxis::  Yes  Antibiotics:  Treating active infection/contamination beyond 24 hours perioperative coverage      Plan of care reviewed with patient and family at bedside

## 2018-06-17 NOTE — PROGRESS NOTES
Pulmonary Critical Care Progress Note        Admit: 6/13/2018  Date of Service:  6/17/2018  Chief Complaint: confusion and difficulty with ambulation    History of Present Illness: 52 y.o. female with a benign past medical history was admitted for acute unspecified encephalopathy from unknown cause, but had a witnessed generalized tonic-clonic seizure in th ER at [facility] who was admitted to the ICU for critical care management and frequent neurological checks.    Review of Systems   Unable to perform ROS: Acuity of condition     Interval Events:  24 hour interval history reviewed    - no events overnight   - no seizure activity   - extubated this morning   - following commands   - SR 50-60s   - SBP 130s   - TFs at goal with cortrak   - No BM since admit   - rucker in place with adequate UOP   - bed rest    - afebrile   - vent day #4   - SBT this morning-->extubated   - CXR(reviewed): clear lung fields   - day #4 of C3       Yesterday's Events:   - sp LP last night and negative results so far   - continuous EEG stopped due to no seizure activity   - waking up and following, but restless   - SR 60-80s   - -140s   - CVP 13   - TFs at goal   - Rucker in place with adequate UOP   - Tmax 100.4   - isolation has been d/c's   - vent day #3   - no SBT this morning yet   - CXR(reviewed): mild bilateral cephalization   - day #3 for vanco/C3/acyclovir   - LP cultures negative so far   - BAL for Strep pneumo, MSSA, H flu      PFSH:  No change.    Physical Exam   Constitutional: She is oriented to person, place, and time. She appears well-developed and well-nourished. No distress.   HENT:   Right Ear: External ear normal.   Left Ear: External ear normal.   Nose: Nose normal.   Mouth/Throat: Oropharynx is clear and moist. No oropharyngeal exudate.   Eyes: Conjunctivae and EOM are normal. Pupils are equal, round, and reactive to light. No scleral icterus.   Neck: Normal range of motion. Neck supple. No thyromegaly present.    Cardiovascular: Normal rate, regular rhythm, normal heart sounds and intact distal pulses.    No murmur heard.  Pulmonary/Chest: Effort normal and breath sounds normal. No respiratory distress. She exhibits no tenderness.   Abdominal: Soft. Bowel sounds are normal. She exhibits no distension and no mass. There is no tenderness.   Musculoskeletal: She exhibits no edema or tenderness.   Lymphadenopathy:     She has no cervical adenopathy.   Neurological: She is alert and oriented to person, place, and time. She displays normal reflexes. No cranial nerve deficit or sensory deficit. She exhibits normal muscle tone. Coordination normal.   Following all commands   Skin: Skin is warm and dry. Capillary refill takes less than 2 seconds. No rash noted. She is not diaphoretic.   Psychiatric: She has a normal mood and affect. Thought content normal.   Nursing note and vitals reviewed.      Respiratory:  Car Vent Mode: APVCMV, Rate (breaths/min): 20, Vt Target (mL): 440, PEEP/CPAP: 8, FiO2: 30, Static Compliance (ml / cm H2O): 52.9, Control VTE (exp VT): 422  Pulse Oximetry: 98 %    HemoDynamics:  Pulse: 65, Heart Rate (Monitored): 64  NIBP: 141/75  CVP (mm Hg): (!) 14 MM HG            Neuro:  GCS Total [place] Coma Score: 8    CT head (reviewed):  FINDINGS: There is no evidence of mass or mass effect. CSF spaces are normal. There is no periventricular chronic small vessel ischemic change present. There is no intracranial hemorrhage seen. The calvarium is intact. There is no scalp injury. There is bilateral maxillary, ethmoid, frontal and sphenoid sinus disease.    MRI:  1.  No evidence of acute territorial infarct, intracranial hemorrhage or mass lesion.  2.  Mild microangiopathic ischemic change versus demyelination or gliosis.  3.  Pansinusitis.    CTA head:  1.  CT angiogram of the [place] within normal limits.  2.  No dural sinus thrombosis appreciated.  3.  Persistent fetal morphology of the bilateral  posterior cerebral arteries.  4.  Severe pansinusitis  Fluids:  Intake/Output       06/15/18 0700 - 06/16/18 0659 06/16/18 0700 - 06/17/18 0659 06/17/18 0700 - 06/18/18 0659      3995-4688 0516-1187 Total 0862-7892 7470-4691 Total 4737-6624 3634-6217 Total       Intake    I.V.  2991.2  2848.3 5839.5  1716  1575.7 3291.7  --  -- --    Propofol Volume 281.2 208.3 489.5 316 280.7 596.7 -- -- --    IV Piggyback Volume (IV Piggyback) 1470 1200 2670 200 100 300 -- -- --    IV Volume (20k 0.9Ns) 1200 1200 2400 1200 1000 2200 -- -- --    IV Volume (NS TKO) 40 240 280 -- 195 195 -- -- --    Other  --  30 30  20  90 110  --  -- --    Medications (P.O./ Enteral Liquids) -- 30 30 20 90 110 -- -- --    Enteral  --  440 440  60  620 680  --  -- --    Free Water / Tube Flush -- 90 90 60 120 180 -- -- --    Intake (mL) (Enteral Tube 10 Right Nare) -- 350 350 -- 500 500 -- -- --    Total Intake 2991.2 3318.3 6309.5 1796 2285.7 4081.7 -- -- --       Output    Urine  1225  1455 2680  2950  1495 4445  --  -- --    Output (mL) (Urinary Catheter Indwelling Catheter 16) 1225 1455 2680 2950 1495 4445 -- -- --    Stool  --  -- --  --  -- --  --  -- --    Number of Times Stooled -- 0 x 0 x 0 x 0 x 0 x -- -- --    Total Output 1225 1455 2680 2950 1495 4445 -- -- --       Net I/O     1766.2 1863.3 3629.5 -1154 790.7 -363.3 -- -- --        Weight: 92.3 kg (203 lb 7.8 oz)  Recent Labs      06/14/18   0548  06/15/18   0441  06/16/18   0621   SODIUM  135  137  139   POTASSIUM  4.0  3.7  3.6   CHLORIDE  105  109  112   CO2  21  19*  20   BUN  8  9  8   CREATININE  0.75  0.64  0.68   MAGNESIUM   --   1.5  1.6   PHOSPHORUS   --   2.4*  2.5   CALCIUM  8.5  7.9*  8.1*       GI/Nutrition:    Liver Function  Recent Labs      06/14/18   0548  06/15/18   0441  06/16/18   0621   GLUCOSE  118*  90  103*       Heme:  Recent Labs      06/14/18   0548  06/15/18   0441  06/16/18   0621   RBC  4.00*  3.33*  3.18*   HEMOGLOBIN  12.8  10.6*  10.3*   HEMATOCRIT   36.5*  30.4*  29.4*   PLATELETCT  237  176  157*       Infectious Disease:  Temp  Av.1 °C (98.7 °F)  Min: 36.7 °C (98.1 °F)  Max: 37.3 °C (99.1 °F)  Micro: reviewed  Recent Labs      18   0548  06/15/18   0441  18   0621   WBC  14.5*  14.9*  8.9     Current Facility-Administered Medications   Medication Dose Frequency Provider Last Rate Last Dose   • cefTRIAXone (ROCEPHIN) 2 g in  mL IVPB  2 g Q24HRS Naina Sommer M.D.       • famotidine (PEPCID) tablet 20 mg  20 mg BID Shakira Butler M.D.        Or   • famotidine (PEPCID) injection 20 mg  20 mg BID Shakira Butler M.D.   20 mg at 18   • norepinephrine (LEVOPHED) 8 mg in  mL Infusion  0-30 mcg/min Continuous Puneet Woo M.D.   Stopped at 06/15/18 0145   • ziprasidone (GEODON) capsule 20 mg  20 mg BID PRN Anderson Mendes M.D.       • enoxaparin (LOVENOX) inj 40 mg  40 mg DAILY Alli Torres M.D.   40 mg at 18 0918   • acetaminophen (TYLENOL) suppository 650 mg  650 mg Q6HRS PRN Puneet Woo M.D.   650 mg at 18 0122   • levETIRAcetam (KEPPRA) 1,500 mg in  mL IVPB  1,500 mg Q12HRS Anderson Mendes M.D.   Stopped at 186   • propofol (DIPRIVAN) injection  0-80 mcg/kg/min Continuous Puneet Woo M.D. 20.1 mL/hr at 18 0458 40 mcg/kg/min at 18 0458   • Respiratory Care per Protocol   Continuous RT Puneet Woo M.D.       • senna-docusate (PERICOLACE or SENOKOT S) 8.6-50 MG per tablet 2 Tab  2 Tab BID Puneet Woo M.D.   2 Tab at 18    And   • polyethylene glycol/lytes (MIRALAX) PACKET 1 Packet  1 Packet QDAY PRN Puneet Woo M.D.   1 Packet at 18    And   • magnesium hydroxide (MILK OF MAGNESIA) suspension 30 mL  30 mL QDAY PRN Puneet Woo M.D.        And   • bisacodyl (DULCOLAX) suppository 10 mg  10 mg QDAY PRN Puneet Woo M.D.       • MD ALERT...Adult ICU Electrolyte  Replacement per Pharmacy Protocol   pharmacy to dose Puneet Woo M.D.       • Pharmacy Consult: Enteral tube feeding - review meds/change route/product selection  1 Each PRN Puneet Woo M.D.       • fentaNYL (SUBLIMAZE) injection 25 mcg  25 mcg Q HOUR PRN Puneet Woo M.D.        Or   • fentaNYL (SUBLIMAZE) injection 50 mcg  50 mcg Q HOUR PRN Puneet Woo M.D.   50 mcg at 06/15/18 0953    Or   • fentaNYL (SUBLIMAZE) injection 100 mcg  100 mcg Q HOUR PRN Puneet Woo M.D.       • ipratropium-albuterol (DUONEB) nebulizer solution  3 mL Q2HRS PRN (RT) Puneet Woo M.D.       • LORazepam (ATIVAN) injection 2-4 mg  2-4 mg Q HOUR PRN Puneet Woo M.D.       • acetaminophen (TYLENOL) tablet 650 mg  650 mg Q6HRS PRN Maurilio Schumacher M.D.       • ondansetron (ZOFRAN) syringe/vial injection 4 mg  4 mg Q4HRS PRN Maurilio Schumacher M.D.   4 mg at 06/13/18 2046   • ondansetron (ZOFRAN ODT) dispertab 4 mg  4 mg Q4HRS PRN Maurilio Schumacher M.D.       • promethazine (PHENERGAN) tablet 12.5-25 mg  12.5-25 mg Q4HRS PRN Maurilio Schumacher M.D.       • promethazine (PHENERGAN) suppository 12.5-25 mg  12.5-25 mg Q4HRS PRN Maurilio Schumacher M.D.       • prochlorperazine (COMPAZINE) injection 5-10 mg  5-10 mg Q4HRS PRN Maurilio Schumacher M.D.       • 0.9 % NaCl with KCl 20 mEq infusion   Continuous Puneet Woo M.D. 100 mL/hr at 06/16/18 2057       Last reviewed on 6/13/2018  4:43 PM by Pina Thapa [facility]    Quality  Measures:  Radiology images reviewed, Labs reviewed, Medications reviewed and EKG reviewed  Sam catheter: Critically Ill - Requiring Accurate Measurement of Urinary Output      DVT Prophylaxis: Enoxaparin (Lovenox)  DVT prophylaxis - mechanical: SCDs  Ulcer prophylaxis: Yes  Antibiotics: Treating active infection/contamination beyond 24 hours perioperative coverage        Problems/Plan:    Acute Hypoxic Respiratory  Failure   - due to airway protection and to obtain diagnostic studies   - intubated on 6/14   - cont RT/O2 protocols   - cont full vent support   - cont vent bundle protocols   - adjust vent settings based on ABGs/CXRs   - SBT with good parameters-->extubated   - s/p bronch with BAL on 6/14 with MSSA, H flu, and strep pneumonia:   - Cont C3  Acute Unspecified Encephalopathy   - CT head negative   - UDS negative as well as metabolic panel   - ? Related to seizures   - ? Viral etiology   - q 2 hr neuro checks   - neurology following   - cont ceftriaxone, and ID stopped vanco/acyclovir on 6/16   - s/p decadron   - CTA head negative   - MRI head negative   - s/p LP 6/15 with viral serologies pending   - ID consulting   - cont daily Geodon  Acute Generalized Tonic-Clonic Seizure   - neurology following   - s/p Keppra 2g load and will continue Keppra 750mg IV BID   - cont seizure precautions   - continuous EEG   - s/p LP 6/15 with viral serologies still pending, but negative bacterial   - negative MRI brain  Pansinusitis   - noted on CT head and MRI brain   - cont ceftriaxone  Hypokalemia   - repleted  Hypomagnesemia   - repleting  Acute Leukocytosis   -  Improving and likely infectious   - decadron started prior to starting ceftriaxone, vanco, and acyclovir  Prophylaxis   - SCDs    Pt's prognosis remains guarded.  She was extubated this morning, but has a high risk of clinical deterioration and need for emergent reintubation.  She needs close ICU monitoring.  She remains at high risk of deterioration, worsening vital organ dysfunction, and death without the above critical care interventions.    Discussed patient condition and risk of morbidity and/or mortality with Family, RN, RT, Therapies, Pharmacy, Dietary, , Charge nurse / hot rounds, Patient and hospitalist.    Critical care time = 37 minutes

## 2018-06-17 NOTE — PROGRESS NOTES
"S- following commands, crying.  No other complaints.     O-   Allergies:   Allergies   Allergen Reactions   • Penicillins Unspecified     \"childhood\"  6/14/18: Patient tolerates cephalosporins         Current Facility-Administered Medications:   •  [START ON 6/17/2018] cefTRIAXone (ROCEPHIN) 2 g in  mL IVPB, 2 g, Intravenous, Q24HRS, Naina Sommer M.D.  •  famotidine (PEPCID) tablet 20 mg, 20 mg, Oral, BID **OR** famotidine (PEPCID) injection 20 mg, 20 mg, Intravenous, BID, Shakira Butler M.D.  •  norepinephrine (LEVOPHED) 8 mg in  mL Infusion, 0-30 mcg/min, Intravenous, Continuous, Puneet Woo M.D., Stopped at 06/15/18 0145  •  ziprasidone (GEODON) capsule 20 mg, 20 mg, Oral, BID PRN, Anderson Mendes M.D.  •  enoxaparin (LOVENOX) inj 40 mg, 40 mg, Subcutaneous, DAILY, Alli Torres M.D., 40 mg at 06/16/18 0918  •  acetaminophen (TYLENOL) suppository 650 mg, 650 mg, Rectal, Q6HRS PRN, Puneet Woo M.D., 650 mg at 06/14/18 0122  •  levETIRAcetam (KEPPRA) 1,500 mg in  mL IVPB, 1,500 mg, Intravenous, Q12HRS, Anderson Mendes M.D., Stopped at 06/16/18 0933  •  propofol (DIPRIVAN) injection, 0-80 mcg/kg/min, Intravenous, Continuous, Last Rate: 30.2 mL/hr at 06/16/18 1831, 60 mcg/kg/min at 06/16/18 1831 **AND** Triglycerides Starting now and then Every 3 Days, , , Every 3 Days (0300), Puneet Woo M.D.  •  Respiratory Care per Protocol, , Nebulization, Continuous RT, Puneet Woo M.D.  •  senna-docusate (PERICOLACE or SENOKOT S) 8.6-50 MG per tablet 2 Tab, 2 Tab, Oral, BID, 2 Tab at 06/16/18 0918 **AND** polyethylene glycol/lytes (MIRALAX) PACKET 1 Packet, 1 Packet, Oral, QDAY PRN **AND** magnesium hydroxide (MILK OF MAGNESIA) suspension 30 mL, 30 mL, Oral, QDAY PRN **AND** bisacodyl (DULCOLAX) suppository 10 mg, 10 mg, Rectal, QDAY PRN, Puneet Woo M.D.  •  MD ALERT...Adult ICU Electrolyte Replacement per Pharmacy Protocol, , Other, pharmacy " to dose, Puneet Woo M.D.  •  Action is required: Protocol 751 Tube Feeding Enteral Feeding has been implemented, , , Once **AND** Protocol 751 Document tube feeding in I&O doc flow sheet, , , CONTINUOUS **AND** Protocol 751 elevate HOB, , , CONTINUOUS **AND** Protocol 751 Nutrition (Dietary) Consult, , , Once **AND** [START ON 6/18/2018] Weigh Patient, , , MO & TH **AND** Protocol 751 Tube Placement, , , CONTINUOUS **AND** [CANCELED] Protocol 751 Diet Tube Feeding, , , Continuous **AND** Protocol 751 Instructions, , , CONTINUOUS **AND** Protocol 751 Goal Rate, , , CONTINUOUS **AND** Protocol 751 Start Rate, , , CONTINUOUS **AND** Protocol 751 Order to be Separately Placed by RN, , , CONTINUOUS **AND** [START ON 6/18/2018] CRP Quantitative (Non-Cardiac), , , EVERY MONDAY (0300) **AND** [START ON 6/18/2018] Prealbumin, , , EVERY MONDAY (0300) **AND** Pharmacy Consult: Enteral tube feeding - review meds/change route/product selection, 1 Each, Other, PRN **AND** Protocol 751 Nursing Communication Tube Occlusion, , , CONTINUOUS **AND** Protocol 751 Tube Maintenance, , , PRN, Puneet Woo M.D.  •  fentaNYL (SUBLIMAZE) injection 25 mcg, 25 mcg, Intravenous, Q HOUR PRN **OR** fentaNYL (SUBLIMAZE) injection 50 mcg, 50 mcg, Intravenous, Q HOUR PRN, 50 mcg at 06/15/18 0953 **OR** fentaNYL (SUBLIMAZE) injection 100 mcg, 100 mcg, Intravenous, Q HOUR PRN, Puneet Woo M.D.  •  ipratropium-albuterol (DUONEB) nebulizer solution, 3 mL, Nebulization, Q2HRS PRN (RT), Puneet Woo M.D.  •  LORazepam (ATIVAN) injection 2-4 mg, 2-4 mg, Intravenous, Q HOUR PRN, Puneet Woo M.D.  •  acetaminophen (TYLENOL) tablet 650 mg, 650 mg, Oral, Q6HRS PRN, Maurilio Schumacher M.D.  •  ondansetron (ZOFRAN) syringe/vial injection 4 mg, 4 mg, Intravenous, Q4HRS PRN, Maurilio Schumacher M.D., 4 mg at 06/13/18 2046  •  ondansetron (ZOFRAN ODT) dispertab 4 mg, 4 mg, Oral, Q4HRS PRN, Maurilio CYR  TAMIA Schumacher  •  promethazine (PHENERGAN) tablet 12.5-25 mg, 12.5-25 mg, Oral, Q4HRS PRN, Maurilio Schumacher M.D.  •  promethazine (PHENERGAN) suppository 12.5-25 mg, 12.5-25 mg, Rectal, Q4HRS PRN, Maurilio Schumacher M.D.  •  prochlorperazine (COMPAZINE) injection 5-10 mg, 5-10 mg, Intravenous, Q4HRS PRN, Maurilio Schumacher M.D.  •  0.9 % NaCl with KCl 20 mEq infusion, , Intravenous, Continuous, Puneet Woo M.D., Last Rate: 100 mL/hr at 06/16/18 1143      PHYSICAL EXAM    Vitals:    06/16/18 1500 06/16/18 1600 06/16/18 1700 06/16/18 1800   BP:       Pulse: 81 83 67 60   Resp: 19 20 20 20   Temp:  37.1 °C (98.8 °F)  37 °C (98.6 °F)   SpO2: 99% 98% 97% 98%   Weight:       Height:           Head/Neck: NCAT no meningismus neg kernig neg brudzinski. No obvious mass or heard bruit. No tender arteries or lost pulses.  Skin: Warm, dry, intact. No rashes observed head/neck or body  Eyes/Funduscopic: unable    Mental Status: sedated, some rousal spontaneous  Cranial Nerves: perrl 2mm, no oc dev,  No nystagmus.    Motor: bilat LE mvmts spon sym, UE trace, no abn mvmts   Sensory: none to pain  Coordination: n/a  DTR's: intact/sym. no clonus. Toes up  Gait/Station: n/a            Labs:  Recent Labs      06/14/18   0548  06/15/18   0441  06/16/18   0621   WBC  14.5*  14.9*  8.9   RBC  4.00*  3.33*  3.18*   HEMOGLOBIN  12.8  10.6*  10.3*   HEMATOCRIT  36.5*  30.4*  29.4*   MCV  91.3  91.3  92.5   MCH  32.0  31.8  32.4   MCHC  35.1*  34.9  35.0   RDW  48.5  49.1  50.5*   PLATELETCT  237  176  157*   MPV  8.4*  8.9*  8.8*     Recent Labs      06/14/18   0548  06/15/18   0441  06/16/18   0621   SODIUM  135  137  139   POTASSIUM  4.0  3.7  3.6   CHLORIDE  105  109  112   CO2  21  19*  20   GLUCOSE  118*  90  103*   BUN  8  9  8   CREATININE  0.75  0.64  0.68   CALCIUM  8.5  7.9*  8.1*                     Recent Labs      06/14/18   0548  06/15/18   0441  06/16/18   0621   SODIUM  135  137  139   POTASSIUM   4.0  3.7  3.6   CHLORIDE  105  109  112   CO2  21  19*  20   GLUCOSE  118*  90  103*   BUN  8  9  8     Recent Labs      06/14/18   0548  06/15/18   0441  06/16/18   0621   SODIUM  135  137  139   POTASSIUM  4.0  3.7  3.6   CHLORIDE  105  109  112   CO2  21  19*  20   BUN  8  9  8   CREATININE  0.75  0.64  0.68   MAGNESIUM   --   1.5  1.6   PHOSPHORUS   --   2.4*  2.5   CALCIUM  8.5  7.9*  8.1*         No results found for this or any previous visit.           Imaging: neuroimaging reviewed and directly visualized by me  DX-CHEST-PORTABLE (1 VIEW)   Final Result         1.  Bibasilar atelectasis, no focal infiltrate is identified      DX-LUMBAR PUNCTURE FOR DIAGNOSIS   Final Result      Fluoroscopic-guided lumbar puncture as described above.      DX-ABDOMEN FOR TUBE PLACEMENT   Final Result      Feeding tube tip at the distal stomach/pylorus.      MR-BRAIN-WITH & W/O   Final Result      1.  No evidence of acute territorial infarct, intracranial hemorrhage or mass lesion.   2.  Mild microangiopathic ischemic change versus demyelination or gliosis.   3.  Pansinusitis.      DX-CHEST-PORTABLE (1 VIEW)   Final Result         1.  Bibasilar atelectasis, no focal infiltrate is identified      CT-CTA HEAD WITH & W/O-POST PROCESS   Final Result         1.  CT angiogram of the [place] within normal limits.   2.  No dural sinus thrombosis appreciated.   3.  Persistent fetal morphology of the bilateral posterior cerebral arteries.   4.  Severe pansinusitis      DX-CHEST-LIMITED (1 VIEW)   Final Result      1.  Supportive tubing as described above.   2.  No pneumothorax.   3.  Hypoinflation with worsening bibasilar atelectasis.      DX-CHEST-LIMITED (1 VIEW)   Final Result         No acute cardiac or pulmonary abnormality is identified.      CT-HEAD W/O   Final Result      No evidence of acute intracranial process.      EEG INTERPRETATION:   This is a normal extended video electroencephalogram recording in a sedated  patient with intermittent arousals.  No events or seizures were captured during the study. Clinical correlation is recommended.     Note: A normal electroencephalogram does not rule out epilepsy.     Assessment/Plan:    GTC SEIZURES W/ SEVERE POST ICTAL CONFUSION/AGITATION, AMS PRIOR LIKELY CPS WITH STARING SPELLS     ENCEPHALOPATHY, RESOLVING, LIKELY POST ICTAL     SZ PRECAUTIONS     KEPPRA 1500/1500     GEODON OR SEROQUEL FOR PSYCHOSIS/AGITATION, AVOID HIGH POTENCY ANTIPSYCHOTICS      ENT CONSULT FOR SEVERE SINUSITIS R/O FUNGAL WITH EXPOSURES     ID CONSULT APPRECIATED; CSF DOES NOT SUGGEST INFECTION      Discuss seizure hygiene, triggers, and AED compliance/side effects/teratogenicity     Report to DMV for driving restriction; advised no activities where LOC a danger until neuro MD clears as otpt         I personally provided 35 minutes of total critical care time outside of time spent on separately billable/documented procedures. Time includes: review of laboratory data, review of radiology studies, discussion with consultants, discussion with family/patient, monitoring for potential decompensation.  Interventions were performed as documented above.         Anderson Mendes M.D.  , Neurohospitalist & Stroke  Clinical Professor, [employer]  Diplomate, Neurology & Neuroimaging

## 2018-06-17 NOTE — WOUND TEAM
Seen by Wound Team on 6/17/18, Wound Team is signing off. Patient is treated by a dermatologist for an on going skin condition described by family, with scarring noted to the L groin. No drainage present. Patient is at risk for skin breakdown. Nursing to follow Pressure Ulcer Prevention Protocol under the Master Wound Order Set in Epic(Under Manage Orders). Please re consult Wound Team as needed.

## 2018-06-17 NOTE — CARE PLAN
Problem: Ventilation Defect:  Goal: Ability to achieve and maintain unassisted ventilation or tolerate decreased levels of ventilator support  Adult Ventilation Update    Total Vent Days: 4  20/440/+8/30%    Patient Lines/Drains/Airways Status    Active Airway     Name: Placement date: Placement time: Site: Days:    Airway Group ET Tube Oral 8.0 06/14/18   1830   Oral   2                #FVC / Vital Capacity (liters) :  (pt not following commands, too agitated) (06/16/18 1452)  NIF (cm H2O) :  (pt not following commands, too agitated) (06/16/18 1452)  Rapid Shallow Breathing Index (RR/VT): 140 (06/16/18 1452)  Plateau Pressure (Q Shift): 12 (06/17/18 0236)  Static Compliance (ml / cm H2O): 52.9 (06/17/18 0236)  Cough: Non Productive (06/17/18 0000)  Sputum Amount: Small (06/17/18 0000)  Sputum Color: Clear (06/17/18 0000)  Sputum Consistency: Thick (06/17/18 0000)    Mobility  Activity Performed: Unable to mobilize (06/16/18 2000)  Reason Not Mobilized: Bed rest (06/16/18 2000)  Mobilization Comments: pt sitting up in bed with sedation decreased; (06/16/18 2000)    Events/Summary/Plan: patient tolerated ventilation well throughout the night, no issues. Will continue to monitor and assist as needed.

## 2018-06-17 NOTE — PROGRESS NOTES
Infectious Disease Progress Note    Author: Naina Sommer M.D. Date & Time of service: 2018  9:54 AM    Chief Complaint:  Altered mental status    Interval History:  2018 MAXIMUM TEMPERATURE 100.4 and remains on the ventilator FiO2 40% WBC 9 platelets 157 patient is much more responsive. Following commands  2018 patient has been extubated. MAXIMUM TEMPERATURE 99. WBCs 7.6 platelets 184 feels  much better  Labs Reviewed, Medications Reviewed and Radiology Reviewed.    Review of Systems:  Review of Systems   Constitutional: Positive for malaise/fatigue. Negative for fever.   HENT: Negative for congestion.    Respiratory: Negative for cough.    Cardiovascular: Negative for chest pain.   Gastrointestinal: Negative for nausea and vomiting.   Genitourinary: Negative for dysuria.   Musculoskeletal: Negative for myalgias.   Neurological: Negative for sensory change, speech change, seizures and headaches.       Hemodynamics:  Temp (24hrs), Av.1 °C (98.7 °F), Min:36.7 °C (98.1 °F), Max:37.2 °C (99 °F)  Temperature: 37.1 °C (98.8 °F)  Pulse  Av  Min: 50  Max: 119Heart Rate (Monitored): 68  NIBP: 128/74  CVP (mm Hg): (!) 12 MM HG    Physical Exam:  Physical Exam   Constitutional: She is oriented to person, place, and time. She appears well-developed and well-nourished.   HENT:   Mouth/Throat: No oropharyngeal exudate.   No sinus tenderness   Eyes: No scleral icterus.   Cardiovascular: Regular rhythm.    No murmur heard.  Pulmonary/Chest: She has no wheezes. She has no rales.   Abdominal: Soft. There is no tenderness. There is no rebound.   Musculoskeletal: She exhibits no edema.   Neurological: She is alert and oriented to person, place, and time.   Skin: No erythema.   Vitals reviewed.      Meds:    Current Facility-Administered Medications:   •  cefTRIAXone (ROCEPHIN) IV  •  famotidine **OR** famotidine  •  ziprasidone  •  enoxaparin (LOVENOX) injection  •  acetaminophen  •  levETIRAcetam (KEPPRA)  IV  •  Respiratory Care per Protocol  •  senna-docusate **AND** polyethylene glycol/lytes **AND** magnesium hydroxide **AND** bisacodyl  •  MD ALERT...Adult ICU Electrolyte Replacement per Pharmacy Protocol  •  Action is required: Protocol 751 Tube Feeding Enteral Feeding has been implemented **AND** Protocol 751 Document tube feeding in I&O doc flow sheet **AND** Protocol 751 elevate HOB **AND** Protocol 751 Nutrition (Dietary) Consult **AND** [START ON 6/18/2018] Weigh Patient **AND** Protocol 751 Tube Placement **AND** [CANCELED] Protocol 751 Diet Tube Feeding **AND** Protocol 751 Instructions **AND** Protocol 751 Goal Rate **AND** Protocol 751 Start Rate **AND** Protocol 751 Order to be Separately Placed by RN **AND** [START ON 6/18/2018] CRP Quantitative (Non-Cardiac) **AND** [START ON 6/18/2018] Prealbumin **AND** Pharmacy **AND** Protocol 751 Nursing Communication Tube Occlusion **AND** Protocol 751 Tube Maintenance  •  fentaNYL **OR** fentaNYL **OR** fentaNYL  •  ipratropium-albuterol  •  LORazepam  •  acetaminophen  •  ondansetron  •  ondansetron  •  promethazine  •  promethazine  •  prochlorperazine    Labs:  Recent Labs      06/15/18   0441  06/16/18   0621  06/17/18   0530   WBC  14.9*  8.9  7.6   RBC  3.33*  3.18*  3.36*   HEMOGLOBIN  10.6*  10.3*  10.7*   HEMATOCRIT  30.4*  29.4*  31.2*   MCV  91.3  92.5  92.9   MCH  31.8  32.4  31.8   RDW  49.1  50.5*  50.6*   PLATELETCT  176  157*  184   MPV  8.9*  8.8*  8.8*     Recent Labs      06/15/18   0441  06/16/18   0621  06/17/18   0530   SODIUM  137  139  140   POTASSIUM  3.7  3.6  3.6   CHLORIDE  109  112  112   CO2  19*  20  21   GLUCOSE  90  103*  113*   BUN  9  8  10     Recent Labs      06/15/18   0441  06/16/18   0621  06/17/18   0530   CREATININE  0.64  0.68  0.62       Imaging:  Ct-cta Head With & W/o-post Process    Result Date: 6/14/2018 6/14/2018 9:13 PM HISTORY/REASON FOR EXAM:  Altered Mental Status TECHNIQUE/EXAM DESCRIPTION: CT angiogram of  the [place] without and with contrast.  Initial precontrast images were obtained of the head from the skull base through the vertex.  Postcontrast images were obtained of the [place] following the power injection of nonionic contrast at 5.0 mL/sec. CT venogram images were then obtained. Thin-section helical images were obtained with overlapping reconstruction interval. Coronal and sagittal multiplanar volume reformats were generated.  3D angiographic images were reviewed on PACS.  Maximum intensity projection (MIP) images were generated and reviewed. 100 mL of Omnipaque 350 nonionic contrast was injected intravenously. Low dose optimization technique was utilized for this CT exam including automated exposure control and adjustment of the mA and/or kV according to patient size. COMPARISON:  June 13, 2018. FINDINGS: The posterior circulation shows the distal vertebral arteries to be patent. The vertebrobasilar confluence is intact. The basilar artery is patent. No aneurysm or occlusive lesion is evident. There is persistent fetal morphology of the bilateral posterior cerebral arteries with contribution from the basilar artery. The anterior circulation shows no stenotic or occlusive lesion. No aneurysm is evident about the [place]. The dural sinuses are well opacified without apparent thrombus filling defect. The brain is unremarkable. There is extensive opacification of the bilateral paranasal sinuses.     1.  CT angiogram of the [place] within normal limits. 2.  No dural sinus thrombosis appreciated. 3.  Persistent fetal morphology of the bilateral posterior cerebral arteries. 4.  Severe pansinusitis    Ct-head W/o    Result Date: 6/13/2018 6/13/2018 3:51 PM HISTORY/REASON FOR EXAM: Active seizure. TECHNIQUE/EXAM DESCRIPTION AND NUMBER OF VIEWS: CT of the head without contrast. Up to date radiation dose reduction adjustments have been utilized to meet ALARA standards for radiation  dose reduction. COMPARISON: None available FINDINGS: There is no evidence of mass or mass effect. CSF spaces are normal. There is no periventricular chronic small vessel ischemic change present. There is no intracranial hemorrhage seen. The calvarium is intact. There is no scalp injury. There is bilateral maxillary, ethmoid, frontal and sphenoid sinus disease.     No evidence of acute intracranial process.    Dx-chest-limited (1 View)    Result Date: 6/14/2018 6/14/2018 7:32 PM HISTORY/REASON FOR EXAM:  Line placement, tube placement TECHNIQUE/EXAM DESCRIPTION AND NUMBER OF VIEWS: Single portable view of the chest. COMPARISON: 6/13/2018 FINDINGS: Cardiac mediastinal contour is unchanged. Lungs show hypoinflation. Linear opacities in the LEFT mid and RIGHT lower lung regions. RIGHT internal jugular catheter with tip at the lower SVC, approximately 2 cm above the cavoatrial junction. Endotracheal tube in place with tip approximately 5 cm above the ryn. No pneumothorax. No major bony abnormality is seen.     1.  Supportive tubing as described above. 2.  No pneumothorax. 3.  Hypoinflation with worsening bibasilar atelectasis.    Dx-chest-limited (1 View)    Result Date: 6/13/2018 6/13/2018 4:14 PM HISTORY/REASON FOR EXAM:  Increased shortness of breath TECHNIQUE/EXAM DESCRIPTION AND NUMBER OF VIEWS: Single portable view of the chest. COMPARISON: None FINDINGS: Heart size is within normal limits. No focal infiltrates or consolidations are identified in the lungs. No pleural fluid collections are identified. No pneumothorax is appreciated.     No acute cardiac or pulmonary abnormality is identified.    Dx-chest-portable (1 View)    Result Date: 6/16/2018 6/16/2018 2:50 AM HISTORY/REASON FOR EXAM: For indication of respiratory failure TECHNIQUE/EXAM DESCRIPTION:  Single AP view of the chest. COMPARISON: Yesterday FINDINGS: Position of medical devices appears stable. The cardiac silhouette appears within normal  limits. The mediastinal contour appears within normal limits.  The central pulmonary vasculature appears normal. The lungs appear well expanded bilaterally.  Hazy linear density in the bilateral lung bases is observed. No significant pleural effusions are identified. The bony structures appear age-appropriate.     1.  Bibasilar atelectasis, no focal infiltrate is identified    Dx-chest-portable (1 View)    Result Date: 6/15/2018    6/15/2018 2:09 AM HISTORY/REASON FOR EXAM: For indication of respiratory failure TECHNIQUE/EXAM DESCRIPTION:  Single AP view of the chest. COMPARISON: Yesterday FINDINGS: Position of medical devices appears stable. The cardiac silhouette appears within normal limits. The mediastinal contour appears within normal limits.  The central pulmonary vasculature appears normal. The lungs appear well expanded bilaterally.  Hazy linear density in the bilateral lung bases is observed. No significant pleural effusions are identified. The bony structures appear age-appropriate.     1.  Bibasilar atelectasis, no focal infiltrate is identified    Dx-lumbar Puncture For Diagnosis    Result Date: 6/15/2018  6/15/2018 3:16 PM HISTORY/REASON FOR EXAM:  Altered level subconsciousness. TECHNIQUE/EXAM DESCRIPTION AND NUMBER OF VIEWS: Fluoroscopic-guided lumbar puncture. 1 fluoroscopic images obtained. Fluoroscopy time: 24 seconds PROCEDURE:     Informed consent was obtained. A timeout was taken. With the patient in prone position, the lumbar region was prepped and draped in a sterile manner. Following local anesthesia with 1% lidocaine, a 22-gauge spinal needle was advanced into the subarachnoid space at the L 4/5 level.  Approximately 9 cc of CSF was collected and the specimens sent to the lab for the studies requested. The patient tolerated the procedure well with no evidence of complication.     Fluoroscopic-guided lumbar puncture as described above.    Mr-brain-with & W/o    Result Date:  6/15/2018  6/14/2018 9:07 PM HISTORY/REASON FOR EXAM:  Seizure. TECHNIQUE/EXAM DESCRIPTION: MRI of the brain and temporal lobes without and with contrast (seizure protocol). The study was performed on a [employer]ative Signa 1.5 Jenna MRI scanner. Spoiled-GRASS sagittal, thin-section T2 axial, T1 coronal, T1 postcontrast coronal, T1 postcontrast axial, and FLAIR coronal images were obtained of the whole brain. Additional thin-section T2 fast spin-echo oblique images were also obtained to display the temporal lobes and hippocampal formations. 18 mL Omniscan contrast were administered intravenously. COMPARISON:  None. FINDINGS: The cortical sulci and gyri are within normal limits. The ventricular system is within normal limits. The bony calvaria are intact. There is no evidence of extra-axial fluid collection or intracranial mass effect. There is a pattern of mild supratentorial white matter disease with scattered foci of bright T2 and FLAIR signal in the subcortical and deep white matter of both hemispheres consistent with small vessel ischemic change versus demyelination or gliosis. There is no evidence of abnormal diffusion-weighted signal intensity in the brain. There is no evidence of abnormal enhancement in the brain parenchyma. There are normal flow voids in the cavernous carotid arteries and M1 segments. There are normal flow voids in the distal vertebral basilar system. There are normal flow voids in the dural venous sinuses. There is diffuse paranasal sinus mucosal thickening. Mastoid air cells are well aerated.     1.  No evidence of acute territorial infarct, intracranial hemorrhage or mass lesion. 2.  Mild microangiopathic ischemic change versus demyelination or gliosis. 3.  Pansinusitis.    Dx-abdomen For Tube Placement    Result Date: 6/15/2018  6/15/2018 3:58 PM HISTORY/REASON FOR EXAM:  Coretrack tube placement. TECHNIQUE/EXAM DESCRIPTION AND NUMBER OF VIEWS:  1 view(s) of the abdomen. COMPARISON:  None.  FINDINGS: Enteric tube has been placed. The tip projects over the distal stomach/pylorus. The bowel gas pattern is within normal limits.     Feeding tube tip at the distal stomach/pylorus.      Micro:  Results     Procedure Component Value Units Date/Time    CSF CULTURE [[id number]] Collected:  06/15/18 1555    Order Status:  Completed Specimen:  CSF Updated:  06/17/18 0939     Significant Indicator NEG     Source CSF     Site Tap     CSF Culture No growth at 48 hours.     Gram Stain Result Rare WBCs.  No organisms seen.      AFB CULTURE [[id number]] Collected:  06/14/18 1845    Order Status:  Completed Specimen:  Respirate Updated:  06/17/18 0925     Significant Indicator NEG     Source RESP     Site Quantitative BAL RLL     AFB Culture Culture in progress.     AFB Smear Results No acid fast bacilli seen.    Narrative:       CALL  [place]  [facility] tel. [phone],  CALLED  [facility] tel. [phone] 06/16/2018, 10:46, RB PERF. RESULTS CALLED  TO:[id number] RN (Str pnuemo, Hinf)    FUNGAL CULTURE [[id number]] Collected:  06/14/18 1845    Order Status:  Completed Specimen:  Respirate Updated:  06/17/18 0925     Significant Indicator NEG     Source RESP     Site Quantitative BAL RLL     Fungal Culture Culture in progress.    Narrative:       CALL  [place]a  [facility] tel. [phone],  CALLED  [facility] tel. [phone] 06/16/2018, 10:46, RB PERF. RESULTS CALLED  TO:[id number] RN (Str pnuemo, Hinf)    QUANT BRONCHIAL WASHING [[id number]]  (Abnormal)  (Susceptibility) Collected:  06/14/18 1845    Order Status:  Completed Specimen:  Respirate Updated:  06/17/18 0925     Significant Indicator POS (POS)     Source RESP     Site Quantitative BAL RLL     Quantitative Bronch Washing Growth noted after further incubation, see below for  organism identification.   (A)     Gram Stain Result Moderate WBCs.  Moderate mixed bacteria, no predominant organism seen.  <5% intracellular organisms.       Quantitative Bronch Washing Staphylococcus aureus  3,000 cfu/mL  This isolate  is presumed to be clindamycin resistant based on  detection of inducible resistance.  Clindamycin may still  be effective in some patients.   (A)      Haemophilus influenzae (Beta-lactamase positive)  20,000 cfu/mL   (A)      Streptococcus pneumoniae  4,000 cfu/mL   (A)    Narrative:       CALL  [place]a  [facility] tel. [phone],  CALLED  [facility] tel. [phone] 06/16/2018, 10:46, RB PERF. RESULTS CALLED  TO:[id number] RN (Str pnuemo, Hinf)    Culture & Susceptibility     STAPHYLOCOCCUS AUREUS     Antibiotic Sensitivity Microscan Unit Status    Ampicillin/sulbactam Sensitive <=8/4 mcg/mL Preliminary    Method: SENSITIVITY, CARLOS    Clindamycin Resistant <=0.5 mcg/mL Preliminary    Method: SENSITIVITY, CARLOS    Daptomycin Sensitive <=0.5 mcg/mL Preliminary    Method: SENSITIVITY, CARLOS    Erythromycin Resistant >4 mcg/mL Preliminary    Method: SENSITIVITY, CARLOS    Moxifloxacin Sensitive <=0.5 mcg/mL Preliminary    Method: SENSITIVITY, CARLOS    Oxacillin Sensitive <=0.25 mcg/mL Preliminary    Method: SENSITIVITY, CARLOS    Penicillin Resistant >8 mcg/mL Preliminary    Method: SENSITIVITY, CARLOS    Tetracycline Sensitive <=4 mcg/mL Preliminary    Method: SENSITIVITY, CARLOS    Trimeth/Sulfa Sensitive <=0.5/9.5 mcg/mL Preliminary    Method: SENSITIVITY, CARLOS    Vancomycin Sensitive 1 mcg/mL Preliminary    Method: SENSITIVITY, CARLOS                       HSV 1/2 BY PCR(HERPES) [[id number]] Collected:  06/15/18 1555    Order Status:  Completed Specimen:  CSF Updated:  06/16/18 1937     Significant Indicator NEG     Source CSF     Site Tap     HSV 1/2 PCR Negative for HSV Type 1 and 2 by PCR.  NOTE:  This test has not been FDA approved.  It has been validated in the laboratory in accordance  with CLIA guidelines.      ACID FAST STAIN [[id number]] Collected:  06/14/18 1845    Order Status:  Completed Specimen:  Respirate Updated:  06/16/18 0954     Significant Indicator NEG     Source RESP     Site Quantitative BAL RLL     AFB Smear Results No acid fast bacilli  "seen.    GRAM STAIN [[id number]] Collected:  06/14/18 1845    Order Status:  Completed Specimen:  Respirate Updated:  06/16/18 0954     Significant Indicator .     Source RESP     Site Quantitative BAL RLL     Gram Stain Result Moderate WBCs.  Moderate mixed bacteria, no predominant organism seen.  <5% intracellular organisms.      GRAM STAIN [[id number]] Collected:  06/15/18 1555    Order Status:  Completed Specimen:  CSF Updated:  06/15/18 1719     Significant Indicator .     Source CSF     Site Tap     Gram Stain Result Rare WBCs.  No organisms seen.      BLOOD CULTURE [[id number]] Collected:  06/14/18 0122    Order Status:  Completed Specimen:  Blood from Peripheral Updated:  06/15/18 0753     Significant Indicator NEG     Source BLD     Site PERIPHERAL     Blood Culture No Growth    Note: Blood cultures are incubated for 5 days and  are monitored continuously.Positive blood cultures  are called to the RN and reported as soon as  they are identified.      Narrative:       Collected By:[id number] [facility]  Per Hospital Policy: Only change Specimen Src: to \"Line\" if  specified by physician order.    BLOOD CULTURE [[id number]] Collected:  06/14/18 0122    Order Status:  Completed Specimen:  Blood from Peripheral Updated:  06/15/18 0753     Significant Indicator NEG     Source BLD     Site PERIPHERAL     Blood Culture No Growth    Note: Blood cultures are incubated for 5 days and  are monitored continuously.Positive blood cultures  are called to the RN and reported as soon as  they are identified.      Narrative:       Collected By:[id number] [facility]  Per Hospital Policy: Only change Specimen Src: to \"Line\" if  specified by physician order.    CSF CULTURE [[id number]]     Order Status:  No result Specimen:  CSF from Tap     HSV 1/2 BY PCR(HERPES) [[id number]]     Order Status:  No result Specimen:  CSF from Spinal Fluid     URINALYSIS [[id number]]  (Abnormal) Collected:  06/13/18 1953    Order Status:  Completed " Specimen:  Urine Updated:  06/13/18 2042     Color Yellow     Character Clear     Specific Gravity 1.022     Ph 5.0     Glucose Negative mg/dL      Ketones 40 (A) mg/dL      Protein Negative mg/dL      Bilirubin Negative     Urobilinogen, Urine 0.2     Nitrite Negative     Leukocyte Esterase Negative     Occult Blood Negative     Micro Urine Req see below     Comment: Microscopic examination not performed when specimen is clear  and chemically negative for protein, blood, leukocyte esterase  and nitrite.         URINALYSIS CULTURE, IF INDICATED [[id number]] Collected:  06/13/18 0000    Order Status:  Canceled Specimen:  Urine     Culture Respiratory without Gram Stain [[id number]] Collected:  06/13/18 0000    Order Status:  Canceled Specimen:  Sputum from Sputum           Assessment:  Active Hospital Problems    Diagnosis   • *Encephalopathy acute [G93.40]   • Acute respiratory failure with hypoxemia (HCC) [J96.01]   • Seizures (HCC) [R56.9]   • Atelectasis [J98.11]   • Electrolyte abnormality [E87.8]   • Leukocytosis [D72.829]   • Hydradenitis [L73.2]       Plan:  Encephalopathy-improved  Lumbar puncture is not suggestive of bacterial meningitis  This is possibly post ictal since patient is now responsive  Await the CSF viral panel      Respiratory failure-improved  Sputum cultures are MSSA strep pneumo and Haemophilus  Likely colonizers. No clear sign of pneumonia..Procalcitonin is normal as well.    Pansinusitis  Will need treatment  Will continue with the Rocephin for now  Will continue at a lower dose of 2 g every 24 hours  Can be changed to by mouth Levaquin when ready for discharge    Seizures  New onset  Patient has family history of seizures as well  Management per neurology    Discussed with internal medicine and neurology

## 2018-06-17 NOTE — RESPIRATORY CARE
Extubation    Cuff leak noted: Yes  Stridor present: No     O2 (LPM): 3   O2 Daily Delivery Respiratory : Silicone Nasal Cannula     Patient toleration: Well    Events/Summary/Plan: Patient extubated. (06/17/18 0846)

## 2018-06-18 ENCOUNTER — APPOINTMENT (OUTPATIENT)
Dept: RADIOLOGY | Facility: MEDICAL CENTER | Age: 53
DRG: 987 | End: 2018-06-18
Attending: INTERNAL MEDICINE
Payer: COMMERCIAL

## 2018-06-18 LAB
ALBUMIN SERPL BCP-MCNC: 3.2 G/DL (ref 3.2–4.9)
ALBUMIN/GLOB SERPL: 1.3 G/DL
ALP SERPL-CCNC: 38 U/L (ref 30–99)
ALT SERPL-CCNC: 19 U/L (ref 2–50)
ANION GAP SERPL CALC-SCNC: 7 MMOL/L (ref 0–11.9)
AST SERPL-CCNC: 17 U/L (ref 12–45)
BACTERIA BRONCH AEROBE CULT: ABNORMAL
BACTERIA CSF CULT: NORMAL
BILIRUB SERPL-MCNC: 0.3 MG/DL (ref 0.1–1.5)
BUN SERPL-MCNC: 10 MG/DL (ref 8–22)
CALCIUM SERPL-MCNC: 8.2 MG/DL (ref 8.5–10.5)
CHLORIDE SERPL-SCNC: 108 MMOL/L (ref 96–112)
CO2 SERPL-SCNC: 25 MMOL/L (ref 20–33)
CREAT SERPL-MCNC: 0.46 MG/DL (ref 0.5–1.4)
CRP SERPL HS-MCNC: 2.64 MG/DL (ref 0–0.75)
ERYTHROCYTE [DISTWIDTH] IN BLOOD BY AUTOMATED COUNT: 47.8 FL (ref 35.9–50)
GLOBULIN SER CALC-MCNC: 2.5 G/DL (ref 1.9–3.5)
GLUCOSE SERPL-MCNC: 128 MG/DL (ref 65–99)
GRAM STN SPEC: ABNORMAL
GRAM STN SPEC: NORMAL
HCT VFR BLD AUTO: 31.9 % (ref 37–47)
HGB BLD-MCNC: 11.1 G/DL (ref 12–16)
MAGNESIUM SERPL-MCNC: 1.5 MG/DL (ref 1.5–2.5)
MCH RBC QN AUTO: 31.7 PG (ref 27–33)
MCHC RBC AUTO-ENTMCNC: 34.8 G/DL (ref 33.6–35)
MCV RBC AUTO: 91.1 FL (ref 81.4–97.8)
PLATELET # BLD AUTO: 203 K/UL (ref 164–446)
PMV BLD AUTO: 8.8 FL (ref 9–12.9)
POTASSIUM SERPL-SCNC: 3.5 MMOL/L (ref 3.6–5.5)
PREALB SERPL-MCNC: 17 MG/DL (ref 18–38)
PROT SERPL-MCNC: 5.7 G/DL (ref 6–8.2)
RBC # BLD AUTO: 3.5 M/UL (ref 4.2–5.4)
SIGNIFICANT IND 70042: ABNORMAL
SIGNIFICANT IND 70042: NORMAL
SITE SITE: ABNORMAL
SITE SITE: NORMAL
SODIUM SERPL-SCNC: 140 MMOL/L (ref 135–145)
SOURCE SOURCE: ABNORMAL
SOURCE SOURCE: NORMAL
WBC # BLD AUTO: 7.4 K/UL (ref 4.8–10.8)

## 2018-06-18 PROCEDURE — 92610 EVALUATE SWALLOWING FUNCTION: CPT

## 2018-06-18 PROCEDURE — 99233 SBSQ HOSP IP/OBS HIGH 50: CPT | Performed by: INTERNAL MEDICINE

## 2018-06-18 PROCEDURE — 99232 SBSQ HOSP IP/OBS MODERATE 35: CPT | Performed by: HOSPITALIST

## 2018-06-18 PROCEDURE — 51798 US URINE CAPACITY MEASURE: CPT

## 2018-06-18 PROCEDURE — 700111 HCHG RX REV CODE 636 W/ 250 OVERRIDE (IP): Performed by: HOSPITALIST

## 2018-06-18 PROCEDURE — G8978 MOBILITY CURRENT STATUS: HCPCS | Mod: CM

## 2018-06-18 PROCEDURE — 86140 C-REACTIVE PROTEIN: CPT

## 2018-06-18 PROCEDURE — 770006 HCHG ROOM/CARE - MED/SURG/GYN SEMI*

## 2018-06-18 PROCEDURE — 306595 SYSTEM,FEED TUBE CLOG ZAPPER: Performed by: INTERNAL MEDICINE

## 2018-06-18 PROCEDURE — G8987 SELF CARE CURRENT STATUS: HCPCS | Mod: CL

## 2018-06-18 PROCEDURE — 700105 HCHG RX REV CODE 258: Performed by: INTERNAL MEDICINE

## 2018-06-18 PROCEDURE — G8996 SWALLOW CURRENT STATUS: HCPCS | Mod: CL

## 2018-06-18 PROCEDURE — G8979 MOBILITY GOAL STATUS: HCPCS | Mod: CJ

## 2018-06-18 PROCEDURE — 80053 COMPREHEN METABOLIC PANEL: CPT

## 2018-06-18 PROCEDURE — 97162 PT EVAL MOD COMPLEX 30 MIN: CPT

## 2018-06-18 PROCEDURE — 97166 OT EVAL MOD COMPLEX 45 MIN: CPT

## 2018-06-18 PROCEDURE — 700111 HCHG RX REV CODE 636 W/ 250 OVERRIDE (IP): Performed by: PSYCHIATRY & NEUROLOGY

## 2018-06-18 PROCEDURE — 700105 HCHG RX REV CODE 258: Performed by: PSYCHIATRY & NEUROLOGY

## 2018-06-18 PROCEDURE — 71045 X-RAY EXAM CHEST 1 VIEW: CPT

## 2018-06-18 PROCEDURE — 85027 COMPLETE CBC AUTOMATED: CPT

## 2018-06-18 PROCEDURE — G8988 SELF CARE GOAL STATUS: HCPCS | Mod: CJ

## 2018-06-18 PROCEDURE — 700102 HCHG RX REV CODE 250 W/ 637 OVERRIDE(OP): Performed by: INTERNAL MEDICINE

## 2018-06-18 PROCEDURE — 700111 HCHG RX REV CODE 636 W/ 250 OVERRIDE (IP): Performed by: INTERNAL MEDICINE

## 2018-06-18 PROCEDURE — 84134 ASSAY OF PREALBUMIN: CPT

## 2018-06-18 PROCEDURE — 83735 ASSAY OF MAGNESIUM: CPT

## 2018-06-18 PROCEDURE — G8997 SWALLOW GOAL STATUS: HCPCS | Mod: CH

## 2018-06-18 PROCEDURE — A9270 NON-COVERED ITEM OR SERVICE: HCPCS | Performed by: HOSPITALIST

## 2018-06-18 PROCEDURE — 99232 SBSQ HOSP IP/OBS MODERATE 35: CPT | Performed by: INTERNAL MEDICINE

## 2018-06-18 PROCEDURE — 700102 HCHG RX REV CODE 250 W/ 637 OVERRIDE(OP): Performed by: HOSPITALIST

## 2018-06-18 PROCEDURE — A9270 NON-COVERED ITEM OR SERVICE: HCPCS | Performed by: INTERNAL MEDICINE

## 2018-06-18 RX ORDER — SODIUM CHLORIDE 9 MG/ML
INJECTION, SOLUTION INTRAVENOUS CONTINUOUS
Status: DISCONTINUED | OUTPATIENT
Start: 2018-06-19 | End: 2018-06-19

## 2018-06-18 RX ORDER — MAGNESIUM SULFATE HEPTAHYDRATE 40 MG/ML
4 INJECTION, SOLUTION INTRAVENOUS ONCE
Status: COMPLETED | OUTPATIENT
Start: 2018-06-18 | End: 2018-06-18

## 2018-06-18 RX ORDER — POTASSIUM CHLORIDE 20 MEQ/1
60 TABLET, EXTENDED RELEASE ORAL ONCE
Status: COMPLETED | OUTPATIENT
Start: 2018-06-18 | End: 2018-06-18

## 2018-06-18 RX ADMIN — ONDANSETRON 4 MG: 2 INJECTION INTRAMUSCULAR; INTRAVENOUS at 05:03

## 2018-06-18 RX ADMIN — ACETAMINOPHEN 650 MG: 325 TABLET, FILM COATED ORAL at 08:36

## 2018-06-18 RX ADMIN — BENZOCAINE AND MENTHOL 1 LOZENGE: 15; 3.6 LOZENGE ORAL at 17:57

## 2018-06-18 RX ADMIN — BENZOCAINE AND MENTHOL 1 LOZENGE: 15; 3.6 LOZENGE ORAL at 09:19

## 2018-06-18 RX ADMIN — SODIUM CHLORIDE 1500 MG: 9 INJECTION, SOLUTION INTRAVENOUS at 22:29

## 2018-06-18 RX ADMIN — POTASSIUM CHLORIDE 60 MEQ: 1500 TABLET, EXTENDED RELEASE ORAL at 10:03

## 2018-06-18 RX ADMIN — ENOXAPARIN SODIUM 40 MG: 100 INJECTION SUBCUTANEOUS at 08:19

## 2018-06-18 RX ADMIN — SODIUM CHLORIDE 1500 MG: 9 INJECTION, SOLUTION INTRAVENOUS at 08:20

## 2018-06-18 RX ADMIN — CEFTRIAXONE 2 G: 2 INJECTION, POWDER, FOR SOLUTION INTRAMUSCULAR; INTRAVENOUS at 08:20

## 2018-06-18 RX ADMIN — MAGNESIUM SULFATE IN WATER 4 G: 40 INJECTION, SOLUTION INTRAVENOUS at 10:03

## 2018-06-18 RX ADMIN — Medication 1 LOZENGE: at 21:45

## 2018-06-18 RX ADMIN — ACETAMINOPHEN 650 MG: 325 TABLET, FILM COATED ORAL at 00:26

## 2018-06-18 RX ADMIN — OXYCODONE HYDROCHLORIDE 5 MG: 5 TABLET ORAL at 17:57

## 2018-06-18 RX ADMIN — OXYCODONE HYDROCHLORIDE 5 MG: 5 TABLET ORAL at 04:47

## 2018-06-18 ASSESSMENT — ENCOUNTER SYMPTOMS
VOMITING: 0
CHILLS: 0
LOSS OF CONSCIOUSNESS: 0
FLANK PAIN: 0
TINGLING: 0
SENSORY CHANGE: 0
COUGH: 1
BACK PAIN: 0
BLURRED VISION: 0
SEIZURES: 0
EYE REDNESS: 0
MYALGIAS: 1
FEVER: 0
PND: 0
SORE THROAT: 1
SHORTNESS OF BREATH: 0
HEADACHES: 0
HALLUCINATIONS: 0
EYE DISCHARGE: 0
NAUSEA: 0
SINUS PAIN: 1
CONSTIPATION: 1
MEMORY LOSS: 0
NECK PAIN: 0
SPEECH CHANGE: 0
FOCAL WEAKNESS: 0
WEAKNESS: 1
PALPITATIONS: 0
DEPRESSION: 0
NERVOUS/ANXIOUS: 0
HEADACHES: 1
ABDOMINAL PAIN: 0
ORTHOPNEA: 0
SPUTUM PRODUCTION: 0
COUGH: 0

## 2018-06-18 ASSESSMENT — PAIN SCALES - GENERAL
PAINLEVEL_OUTOF10: 4
PAINLEVEL_OUTOF10: 3
PAINLEVEL_OUTOF10: 4
PAINLEVEL_OUTOF10: 5
PAINLEVEL_OUTOF10: 4
PAINLEVEL_OUTOF10: 8
PAINLEVEL_OUTOF10: 5
PAINLEVEL_OUTOF10: 7
PAINLEVEL_OUTOF10: 0
PAINLEVEL_OUTOF10: 0

## 2018-06-18 ASSESSMENT — COGNITIVE AND FUNCTIONAL STATUS - GENERAL
WALKING IN HOSPITAL ROOM: TOTAL
MOVING FROM LYING ON BACK TO SITTING ON SIDE OF FLAT BED: UNABLE
PERSONAL GROOMING: A LITTLE
DRESSING REGULAR LOWER BODY CLOTHING: A LOT
TURNING FROM BACK TO SIDE WHILE IN FLAT BAD: UNABLE
MOBILITY SCORE: 7
EATING MEALS: TOTAL
HELP NEEDED FOR BATHING: A LOT
STANDING UP FROM CHAIR USING ARMS: A LOT
CLIMB 3 TO 5 STEPS WITH RAILING: TOTAL
SUGGESTED CMS G CODE MODIFIER MOBILITY: CM
MOVING TO AND FROM BED TO CHAIR: UNABLE
SUGGESTED CMS G CODE MODIFIER DAILY ACTIVITY: CL
DRESSING REGULAR UPPER BODY CLOTHING: A LITTLE
DAILY ACTIVITIY SCORE: 13
TOILETING: A LOT

## 2018-06-18 ASSESSMENT — GAIT ASSESSMENTS: GAIT LEVEL OF ASSIST: UNABLE TO PARTICIPATE

## 2018-06-18 ASSESSMENT — LIFESTYLE VARIABLES: SUBSTANCE_ABUSE: 0

## 2018-06-18 ASSESSMENT — ACTIVITIES OF DAILY LIVING (ADL): TOILETING: INDEPENDENT

## 2018-06-18 NOTE — CARE PLAN
Problem: Nutritional:  Goal: Nutrition support tolerated and meeting greater than 85% of estimated needs  Outcome: MET Date Met: 06/18/18

## 2018-06-18 NOTE — THERAPY
"Pt is a 51 y/o female admitted for seizures and respiratory failure requiring intubation. Pt was successfully extubated on 6/17/18. Pt's spouse and dtr present during PT evaluation and provided hx. Pt currently required Mod - Max A for functional mobility. pt reports hx of ACL tear in Oct 2017, treated non-op and has been managing well with use of knee brace during more strenuous work . Pt provided with ROM exercises to work on in bed and while in chair to address L knee pain and stiffness. Noted fair quad activation of L LE and generalized weakness of B LE due to immobility. Pt will benefit from acute PT interventions to address present impairments and optimize return to independent PLOF.       3Physical Therapy Evaluation completed.   Bed Mobility:  Supine to Sit: Maximal Assist  Transfers: Sit to Stand: Moderate Assist (x2 people for inital stand).   Max A for stand pivot transfer EOB -> chair  Gait: Level Of Assist: Unable to Participate       Plan of Care: Will benefit from Physical Therapy 4 times per week  Discharge Recommendations: Equipment: Will Continue to Assess for Equipment Needs. Post-acute therapy:  D/C disposition will be dependent on pt's progress in acute setting. Based on CLOF displayed today, would recommend post acute placement to optimize return to independent PLOF. Should pt progress as expected, anticipate pt should be able to D/C home with family support.       See \"Rehab Therapy-Acute\" Patient Summary Report for complete documentation.     "

## 2018-06-18 NOTE — PROGRESS NOTES
[facility]ist Progress Note    Date of Service: 2018    Chief Complaint  52 y.o. female admitted 2018 with encephalopathy and seizure    Interval Problem Update      Afebrile  generalized weakness  Good urine output                  Consultants/Specialty  Neurology  Critical care    Disposition  Transfer to neuro  PT OT SLP eval        Review of Systems   Unable to perform ROS: Mental status change   Constitutional: Positive for malaise/fatigue. Negative for chills and fever.   HENT: Negative.    Eyes: Negative for discharge and redness.   Respiratory: Negative for cough, sputum production and shortness of breath.    Cardiovascular: Negative for chest pain, orthopnea, leg swelling and PND.   Gastrointestinal: Positive for constipation. Negative for abdominal pain, nausea and vomiting.   Genitourinary: Negative for flank pain and hematuria.   Musculoskeletal: Positive for joint pain (Generalized). Negative for back pain and neck pain.   Skin: Negative.    Neurological: Positive for weakness (Generalized). Negative for tingling, speech change, focal weakness, seizures, loss of consciousness and headaches.   Psychiatric/Behavioral: Negative for hallucinations, memory loss and substance abuse. The patient is not nervous/anxious.    All other systems reviewed and are negative.     Physical Exam  Laboratory/Imaging   Hemodynamics  Temp (24hrs), Av.1 °C (98.7 °F), Min:36.8 °C (98.2 °F), Max:37.2 °C (98.9 °F)   Temperature: (P) 36.8 °C (98.2 °F)  Pulse  Av.7  Min: 50  Max: 119 Heart Rate (Monitored): 66  NIBP: 128/69 CVP (mm Hg): (!) 13 MM HG    Respiratory      Respiration: (!) 21, Pulse Oximetry: (P) 98 %, O2 Daily Delivery Respiratory : Silicone Nasal Cannula     Work Of Breathing / Effort: (P) Mild  RUL Breath Sounds: (P) Clear;Diminished, RML Breath Sounds: (P) Clear;Diminished, RLL Breath Sounds: (P) Clear;Diminished, WENDY Breath Sounds: (P) Clear;Diminished, LLL Breath Sounds: (P)  Clear;Diminished    Fluids    Intake/Output Summary (Last 24 hours) at 06/18/18 0934  Last data filed at 06/18/18 0800   Gross per 24 hour   Intake             2565 ml   Output             3000 ml   Net             -435 ml       Nutrition  Orders Placed This Encounter   Procedures   • Diet NPO     Standing Status:   Standing     Number of Occurrences:   1     Order Specific Question:   Type:     Answer:   Now [1]     Order Specific Question:   Restrict to:     Answer:   Strict [1]     Physical Exam   Constitutional: She is oriented to person, place, and time. She appears well-developed and well-nourished.   HENT:   Head: Normocephalic and atraumatic.   Right Ear: External ear normal.   Left Ear: External ear normal.   Mouth/Throat: No oropharyngeal exudate.   Eyes: Conjunctivae are normal. Right eye exhibits no discharge. Left eye exhibits no discharge. No scleral icterus.   Neck: Neck supple. No JVD present. No tracheal deviation present.   Cardiovascular: Normal rate and regular rhythm.  Exam reveals no gallop and no friction rub.    No murmur heard.  Pulmonary/Chest: Effort normal and breath sounds normal. No respiratory distress. She has no wheezes. She has no rales. She exhibits no tenderness.   Abdominal: Soft. Bowel sounds are normal. She exhibits no distension and no mass. There is no tenderness. There is no rebound.   Musculoskeletal: She exhibits no edema, tenderness or deformity.   Neurological: She is alert and oriented to person, place, and time. No cranial nerve deficit. She exhibits normal muscle tone.   Generalized weakness no focal deficits   Skin: Skin is warm and dry. She is not diaphoretic. No cyanosis or erythema. Nails show no clubbing.   Psychiatric: She has a normal mood and affect. Her behavior is normal.   Nursing note and vitals reviewed.      Recent Labs      06/16/18   0621  06/17/18   0530  06/18/18   0450   WBC  8.9  7.6  7.4   RBC  3.18*  3.36*  3.50*   HEMOGLOBIN  10.3*  10.7*  11.1*    HEMATOCRIT  29.4*  31.2*  31.9*   MCV  92.5  92.9  91.1   MCH  32.4  31.8  31.7   MCHC  35.0  34.3  34.8   RDW  50.5*  50.6*  47.8   PLATELETCT  157*  184  203   MPV  8.8*  8.8*  8.8*     Recent Labs      06/16/18   0621  06/17/18   0530  06/18/18   0450   SODIUM  139  140  140   POTASSIUM  3.6  3.6  3.5*   CHLORIDE  112  112  108   CO2  20  21  25   GLUCOSE  103*  113*  128*   BUN  8  10  10   CREATININE  0.68  0.62  0.46*   CALCIUM  8.1*  8.6  8.2*                      Assessment/Plan     * Encephalopathy acute   Assessment & Plan    ?  Postictal versus  viral encephalitis/meningitis  Follow-up on viral serology and PCR  Pansinusitis noted on MRI continue ceftriaxone and transitioned to p.o. Levaquin  Discussed with ID          Acute respiratory failure with hypoxemia (HCC)   Assessment & Plan    Extubated 6/17/2018  RT protocol  Aspiration precautions  SLP eval and diet per speech        Seizures (HCC)   Assessment & Plan    Continue Keppra  No recurrent seizures on EEG  Will need outpatient follow-up with neurology        Electrolyte abnormality   Assessment & Plan    Hypokalemia  Hypomagnesemia    Replete potassium and magnesium            Hydradenitis   Assessment & Plan    No signs of active infection at this time          Quality-Core Measures   Reviewed items::  Labs reviewed, Medications reviewed and Radiology images reviewed  Sam catheter::  No Sam  DVT prophylaxis pharmacological::  Enoxaparin (Lovenox)  DVT prophylaxis - mechanical:  SCDs  Ulcer Prophylaxis::  Yes  Antibiotics:  Treating active infection/contamination beyond 24 hours perioperative coverage      Plan of care reviewed with patient and family at bedside   PT/OT SLP

## 2018-06-18 NOTE — CARE PLAN
Problem: Pain Management  Goal: Pain level will decrease to patient's comfort goal  Outcome: PROGRESSING AS EXPECTED  Patient reports generalized aching as well as pain to throat/head related to coughing. Pain treated with prn pain medications as well as repositioning and rest. Will continue to treat pain as needed.     Problem: Mobility  Goal: Risk for activity intolerance will decrease  Outcome: PROGRESSING AS EXPECTED  Patient mobilized to edge of bed this evening with assist of two RNs. Patient able to tolerate sitting at edge of bed for 7 minutes. Tolerated well. Patient requires assistance to reposition in bed, however will make movements independently. PT/OT orders in place.

## 2018-06-18 NOTE — THERAPY
"Speech Language Therapy Clinical Swallow Evaluation completed.  Functional Status: Patient was seen for swallow evaluation this date.  She was awake, alert and oriented in all spheres.  Vocal quality is hoarse, gravely and hypophonic which patient reports is not baseline. Oral mechanism evaluation revealed slight left side tongue deviation.  Presentation of PO consisted of ice, nectars, thin liquids and purees.  Patient with subtle wet voice following swallows of purees and nectars with more substantial wet voice and delayed throat clearing following teaspoon boluses of thin liquids which can be concerning for possible penetration/aspiration. Given changes in vocal quality and S/Sx of aspiration, would recommend continuation of NPO with cortrak and would recommend FEES to further assess oropharyngeal function, r/o aspiration and assess airway protection for swallow.  Will allow for ~5 ice chips per hour in order to minimize xerostomia and maintain integrity of the swallow musculature.  Education provided to patient, , daughter and parents regarding results and recommendations.  All verbalized understanding.  SLP will follow for FEES tomorrow.     Recommendations - Diet:  NPO with cortrak.  Ok FOR ~5 ICE CHIPS PER HOUR                          Medication Administration:  Via cortrak  Plan of Care: Will benefit from Speech Therapy 5 times per week  Post-Acute Therapy: Discharge to transitional care facility for further skilled SLP services vs Discharge to home with outpatient or home health for additional skilled therapy services depending on progress    See \"Rehab Therapy-Acute\" Patient Summary Report for complete documentation.         "

## 2018-06-18 NOTE — PROGRESS NOTES
Infectious Disease Progress Note    Author: Cherie Martinez M.D. Date & Time of service: 2018  8:03 AM    Chief Complaint:  FU Altered mental status    Interval History:  2018 MAXIMUM TEMPERATURE 100.4 and remains on the ventilator FiO2 40% WBC 9 platelets 157 patient is much more responsive. Following commands  2018 patient has been extubated. MAXIMUM TEMPERATURE 99. WBCs 7.6 platelets 184 feels  much better   AF WBC 7.4 has a sore throat and diffuse pain, alert and oriented  Labs Reviewed, Medications Reviewed and Radiology Reviewed.    Review of Systems:  Review of Systems   Constitutional: Positive for malaise/fatigue. Negative for chills and fever.   HENT: Positive for sinus pain and sore throat. Negative for congestion.    Respiratory: Positive for cough.    Cardiovascular: Negative for chest pain.   Gastrointestinal: Negative for nausea and vomiting.   Genitourinary: Negative for dysuria.   Musculoskeletal: Positive for myalgias.   Neurological: Positive for headaches. Negative for sensory change, speech change and seizures.       Hemodynamics:  Temp (24hrs), Av.1 °C (98.7 °F), Min:36.9 °C (98.5 °F), Max:37.2 °C (98.9 °F)  Temperature: 37.1 °C (98.7 °F)  Pulse  Av.7  Min: 50  Max: 119Heart Rate (Monitored): 66  NIBP: 128/69  CVP (mm Hg): (!) 13 MM HG    Physical Exam:  Physical Exam   Constitutional: She is oriented to person, place, and time. She appears well-developed and well-nourished.   HENT:   Head: Normocephalic and atraumatic.   Mouth/Throat: No oropharyngeal exudate.   No sinus tenderness   Eyes: EOM are normal. Pupils are equal, round, and reactive to light. No scleral icterus.   Neck:   R IJ catheter   Cardiovascular: Normal rate and regular rhythm.    No murmur heard.  Pulmonary/Chest: Effort normal. She has no wheezes. She has no rales.   Abdominal: Soft. There is no tenderness. There is no rebound.   Genitourinary:   Genitourinary Comments: Sam   Musculoskeletal:  She exhibits no edema.   Neurological: She is alert and oriented to person, place, and time.   Skin: No erythema.   Vitals reviewed.      Meds:    Current Facility-Administered Medications:   •  benzocaine-menthol  •  oxyCODONE immediate-release  •  cefTRIAXone (ROCEPHIN) IV  •  ziprasidone  •  enoxaparin (LOVENOX) injection  •  acetaminophen  •  levETIRAcetam (KEPPRA) IV  •  Respiratory Care per Protocol  •  senna-docusate **AND** polyethylene glycol/lytes **AND** magnesium hydroxide **AND** bisacodyl  •  MD ALERT...Adult ICU Electrolyte Replacement per Pharmacy Protocol  •  Action is required: Protocol 751 Tube Feeding Enteral Feeding has been implemented **AND** Protocol 751 Document tube feeding in I&O doc flow sheet **AND** Protocol 751 elevate HOB **AND** Protocol 751 Nutrition (Dietary) Consult **AND** Weigh Patient **AND** Protocol 751 Tube Placement **AND** [CANCELED] Protocol 751 Diet Tube Feeding **AND** Protocol 751 Instructions **AND** Protocol 751 Goal Rate **AND** Protocol 751 Start Rate **AND** Protocol 751 Order to be Separately Placed by RN **AND** CRP Quantitative (Non-Cardiac) **AND** Prealbumin **AND** Pharmacy **AND** Protocol 751 Nursing Communication Tube Occlusion **AND** Protocol 751 Tube Maintenance  •  ipratropium-albuterol  •  LORazepam  •  acetaminophen  •  ondansetron  •  ondansetron  •  promethazine  •  promethazine  •  prochlorperazine    Labs:  Recent Labs      06/16/18   0621 06/17/18   0530  06/18/18   0450   WBC  8.9  7.6  7.4   RBC  3.18*  3.36*  3.50*   HEMOGLOBIN  10.3*  10.7*  11.1*   HEMATOCRIT  29.4*  31.2*  31.9*   MCV  92.5  92.9  91.1   MCH  32.4  31.8  31.7   RDW  50.5*  50.6*  47.8   PLATELETCT  157*  184  203   MPV  8.8*  8.8*  8.8*     Recent Labs      06/16/18   0621 06/17/18   0530  06/18/18   0450   SODIUM  139  140  140   POTASSIUM  3.6  3.6  3.5*   CHLORIDE  112  112  108   CO2  20  21  25   GLUCOSE  103*  113*  128*   BUN  8  10  10     Recent Labs       06/16/18   0621  06/17/18   0530  06/18/18   0450   ALBUMIN   --    --   3.2   TBILIRUBIN   --    --   0.3   ALKPHOSPHAT   --    --   38   TOTPROTEIN   --    --   5.7*   ALTSGPT   --    --   19   ASTSGOT   --    --   17   CREATININE  0.68  0.62  0.46*       Imaging:  Ct-cta Head With & W/o-post Process    Result Date: 6/14/2018 6/14/2018 9:13 PM HISTORY/REASON FOR EXAM:  Altered Mental Status TECHNIQUE/EXAM DESCRIPTION: CT angiogram of the [place] without and with contrast.  Initial precontrast images were obtained of the head from the skull base through the vertex.  Postcontrast images were obtained of the [place] following the power injection of nonionic contrast at 5.0 mL/sec. CT venogram images were then obtained. Thin-section helical images were obtained with overlapping reconstruction interval. Coronal and sagittal multiplanar volume reformats were generated.  3D angiographic images were reviewed on PACS.  Maximum intensity projection (MIP) images were generated and reviewed. 100 mL of Omnipaque 350 nonionic contrast was injected intravenously. Low dose optimization technique was utilized for this CT exam including automated exposure control and adjustment of the mA and/or kV according to patient size. COMPARISON:  June 13, 2018. FINDINGS: The posterior circulation shows the distal vertebral arteries to be patent. The vertebrobasilar confluence is intact. The basilar artery is patent. No aneurysm or occlusive lesion is evident. There is persistent fetal morphology of the bilateral posterior cerebral arteries with contribution from the basilar artery. The anterior circulation shows no stenotic or occlusive lesion. No aneurysm is evident about the [place]. The dural sinuses are well opacified without apparent thrombus filling defect. The brain is unremarkable. There is extensive opacification of the bilateral paranasal sinuses.     1.  CT angiogram of the [place] within  normal limits. 2.  No dural sinus thrombosis appreciated. 3.  Persistent fetal morphology of the bilateral posterior cerebral arteries. 4.  Severe pansinusitis    Ct-head W/o    Result Date: 6/13/2018 6/13/2018 3:51 PM HISTORY/REASON FOR EXAM: Active seizure. TECHNIQUE/EXAM DESCRIPTION AND NUMBER OF VIEWS: CT of the head without contrast. Up to date radiation dose reduction adjustments have been utilized to meet ALARA standards for radiation dose reduction. COMPARISON: None available FINDINGS: There is no evidence of mass or mass effect. CSF spaces are normal. There is no periventricular chronic small vessel ischemic change present. There is no intracranial hemorrhage seen. The calvarium is intact. There is no scalp injury. There is bilateral maxillary, ethmoid, frontal and sphenoid sinus disease.     No evidence of acute intracranial process.    Dx-chest-limited (1 View)    Result Date: 6/14/2018 6/14/2018 7:32 PM HISTORY/REASON FOR EXAM:  Line placement, tube placement TECHNIQUE/EXAM DESCRIPTION AND NUMBER OF VIEWS: Single portable view of the chest. COMPARISON: 6/13/2018 FINDINGS: Cardiac mediastinal contour is unchanged. Lungs show hypoinflation. Linear opacities in the LEFT mid and RIGHT lower lung regions. RIGHT internal jugular catheter with tip at the lower SVC, approximately 2 cm above the cavoatrial junction. Endotracheal tube in place with tip approximately 5 cm above the yrn. No pneumothorax. No major bony abnormality is seen.     1.  Supportive tubing as described above. 2.  No pneumothorax. 3.  Hypoinflation with worsening bibasilar atelectasis.    Dx-chest-limited (1 View)    Result Date: 6/13/2018 6/13/2018 4:14 PM HISTORY/REASON FOR EXAM:  Increased shortness of breath TECHNIQUE/EXAM DESCRIPTION AND NUMBER OF VIEWS: Single portable view of the chest. COMPARISON: None FINDINGS: Heart size is within normal limits. No focal infiltrates or consolidations are identified in the lungs. No pleural  fluid collections are identified. No pneumothorax is appreciated.     No acute cardiac or pulmonary abnormality is identified.    Dx-chest-portable (1 View)    Result Date: 6/16/2018 6/16/2018 2:50 AM HISTORY/REASON FOR EXAM: For indication of respiratory failure TECHNIQUE/EXAM DESCRIPTION:  Single AP view of the chest. COMPARISON: Yesterday FINDINGS: Position of medical devices appears stable. The cardiac silhouette appears within normal limits. The mediastinal contour appears within normal limits.  The central pulmonary vasculature appears normal. The lungs appear well expanded bilaterally.  Hazy linear density in the bilateral lung bases is observed. No significant pleural effusions are identified. The bony structures appear age-appropriate.     1.  Bibasilar atelectasis, no focal infiltrate is identified    Dx-chest-portable (1 View)    Result Date: 6/15/2018    6/15/2018 2:09 AM HISTORY/REASON FOR EXAM: For indication of respiratory failure TECHNIQUE/EXAM DESCRIPTION:  Single AP view of the chest. COMPARISON: Yesterday FINDINGS: Position of medical devices appears stable. The cardiac silhouette appears within normal limits. The mediastinal contour appears within normal limits.  The central pulmonary vasculature appears normal. The lungs appear well expanded bilaterally.  Hazy linear density in the bilateral lung bases is observed. No significant pleural effusions are identified. The bony structures appear age-appropriate.     1.  Bibasilar atelectasis, no focal infiltrate is identified    Dx-lumbar Puncture For Diagnosis    Result Date: 6/15/2018  6/15/2018 3:16 PM HISTORY/REASON FOR EXAM:  Altered level subconsciousness. TECHNIQUE/EXAM DESCRIPTION AND NUMBER OF VIEWS: Fluoroscopic-guided lumbar puncture. 1 fluoroscopic images obtained. Fluoroscopy time: 24 seconds PROCEDURE:     Informed consent was obtained. A timeout was taken. With the patient in prone position, the lumbar region was prepped and draped  in a sterile manner. Following local anesthesia with 1% lidocaine, a 22-gauge spinal needle was advanced into the subarachnoid space at the L 4/5 level.  Approximately 9 cc of CSF was collected and the specimens sent to the lab for the studies requested. The patient tolerated the procedure well with no evidence of complication.     Fluoroscopic-guided lumbar puncture as described above.    Mr-brain-with & W/o    Result Date: 6/15/2018  6/14/2018 9:07 PM HISTORY/REASON FOR EXAM:  Seizure. TECHNIQUE/EXAM DESCRIPTION: MRI of the brain and temporal lobes without and with contrast (seizure protocol). The study was performed on a Cat[employer] Signa 1.5 Jenna MRI scanner. Spoiled-GRASS sagittal, thin-section T2 axial, T1 coronal, T1 postcontrast coronal, T1 postcontrast axial, and FLAIR coronal images were obtained of the whole brain. Additional thin-section T2 fast spin-echo oblique images were also obtained to display the temporal lobes and hippocampal formations. 18 mL Omniscan contrast were administered intravenously. COMPARISON:  None. FINDINGS: The cortical sulci and gyri are within normal limits. The ventricular system is within normal limits. The bony calvaria are intact. There is no evidence of extra-axial fluid collection or intracranial mass effect. There is a pattern of mild supratentorial white matter disease with scattered foci of bright T2 and FLAIR signal in the subcortical and deep white matter of both hemispheres consistent with small vessel ischemic change versus demyelination or gliosis. There is no evidence of abnormal diffusion-weighted signal intensity in the brain. There is no evidence of abnormal enhancement in the brain parenchyma. There are normal flow voids in the cavernous carotid arteries and M1 segments. There are normal flow voids in the distal vertebral basilar system. There are normal flow voids in the dural venous sinuses. There is diffuse paranasal sinus mucosal thickening. Mastoid air cells are  well aerated.     1.  No evidence of acute territorial infarct, intracranial hemorrhage or mass lesion. 2.  Mild microangiopathic ischemic change versus demyelination or gliosis. 3.  Pansinusitis.    Dx-abdomen For Tube Placement    Result Date: 6/15/2018  6/15/2018 3:58 PM HISTORY/REASON FOR EXAM:  Coretrack tube placement. TECHNIQUE/EXAM DESCRIPTION AND NUMBER OF VIEWS:  1 view(s) of the abdomen. COMPARISON:  None. FINDINGS: Enteric tube has been placed. The tip projects over the distal stomach/pylorus. The bowel gas pattern is within normal limits.     Feeding tube tip at the distal stomach/pylorus.      Micro:  Results     Procedure Component Value Units Date/Time    CSF CULTURE [[id number]] Collected:  06/15/18 1555    Order Status:  Completed Specimen:  CSF Updated:  06/17/18 0939     Significant Indicator NEG     Source CSF     Site Tap     CSF Culture No growth at 48 hours.     Gram Stain Result Rare WBCs.  No organisms seen.      AFB CULTURE [[id number]] Collected:  06/14/18 1845    Order Status:  Completed Specimen:  Respirate Updated:  06/17/18 0925     Significant Indicator NEG     Source RESP     Site Quantitative BAL RLL     AFB Culture Culture in progress.     AFB Smear Results No acid fast bacilli seen.    Narrative:       CALL  Sepulveda  [facility] tel. [phone],  CALLED  [facility] tel. [phone] 06/16/2018, 10:46, RB PERF. RESULTS CALLED  TO:[id number] RN (Str pnuemo, Hinf)    FUNGAL CULTURE [[id number]] Collected:  06/14/18 1845    Order Status:  Completed Specimen:  Respirate Updated:  06/17/18 0925     Significant Indicator NEG     Source RESP     Site Quantitative BAL RLL     Fungal Culture Culture in progress.    Narrative:       CALL  Sepulveda  [facility] tel. [phone],  CALLED  [facility] tel. [phone] 06/16/2018, 10:46, RB PERF. RESULTS CALLED  TO:[id number] RN (Str pnuemo, Hinf)    QUANT BRONCHIAL WASHING [[id number]]  (Abnormal)  (Susceptibility) Collected:  06/14/18 1845    Order Status:  Completed Specimen:  Respirate Updated:   06/17/18 0925     Significant Indicator POS (POS)     Source RESP     Site Quantitative BAL RLL     Quantitative Bronch Washing Growth noted after further incubation, see below for  organism identification.   (A)     Gram Stain Result Moderate WBCs.  Moderate mixed bacteria, no predominant organism seen.  <5% intracellular organisms.       Quantitative Bronch Washing Staphylococcus aureus  3,000 cfu/mL  This isolate is presumed to be clindamycin resistant based on  detection of inducible resistance.  Clindamycin may still  be effective in some patients.   (A)      Haemophilus influenzae (Beta-lactamase positive)  20,000 cfu/mL   (A)      Streptococcus pneumoniae  4,000 cfu/mL   (A)    Narrative:       CALL  Sepulveda  [facility] tel. [phone],  CALLED  [facility] tel. [phone] 06/16/2018, 10:46, RB PERF. RESULTS CALLED  TO:[id number] RN (Str pnuemo, Hinf)    Culture & Susceptibility     STAPHYLOCOCCUS AUREUS     Antibiotic Sensitivity Microscan Unit Status    Ampicillin/sulbactam Sensitive <=8/4 mcg/mL Preliminary    Method: SENSITIVITY, CARLOS    Clindamycin Resistant <=0.5 mcg/mL Preliminary    Method: SENSITIVITY, CARLOS    Daptomycin Sensitive <=0.5 mcg/mL Preliminary    Method: SENSITIVITY, CARLOS    Erythromycin Resistant >4 mcg/mL Preliminary    Method: SENSITIVITY, CARLOS    Moxifloxacin Sensitive <=0.5 mcg/mL Preliminary    Method: SENSITIVITY, CARLOS    Oxacillin Sensitive <=0.25 mcg/mL Preliminary    Method: SENSITIVITY, CARLOS    Penicillin Resistant >8 mcg/mL Preliminary    Method: SENSITIVITY, CARLOS    Tetracycline Sensitive <=4 mcg/mL Preliminary    Method: SENSITIVITY, CARLOS    Trimeth/Sulfa Sensitive <=0.5/9.5 mcg/mL Preliminary    Method: SENSITIVITY, CARLOS    Vancomycin Sensitive 1 mcg/mL Preliminary    Method: SENSITIVITY, CARLOS                       HSV 1/2 BY PCR(HERPES) [[id number]] Collected:  06/15/18 1555    Order Status:  Completed Specimen:  CSF Updated:  06/16/18 1937     Significant Indicator NEG     Source CSF     Site Tap     HSV  "1/2 PCR Negative for HSV Type 1 and 2 by PCR.  NOTE:  This test has not been FDA approved.  It has been validated in the laboratory in accordance  with CLIA guidelines.      ACID FAST STAIN [[id number]] Collected:  06/14/18 1845    Order Status:  Completed Specimen:  Respirate Updated:  06/16/18 0954     Significant Indicator NEG     Source RESP     Site Quantitative BAL RLL     AFB Smear Results No acid fast bacilli seen.    GRAM STAIN [[id number]] Collected:  06/14/18 1845    Order Status:  Completed Specimen:  Respirate Updated:  06/16/18 0954     Significant Indicator .     Source RESP     Site Quantitative BAL RLL     Gram Stain Result Moderate WBCs.  Moderate mixed bacteria, no predominant organism seen.  <5% intracellular organisms.      GRAM STAIN [[id number]] Collected:  06/15/18 1555    Order Status:  Completed Specimen:  CSF Updated:  06/15/18 1719     Significant Indicator .     Source CSF     Site Tap     Gram Stain Result Rare WBCs.  No organisms seen.      BLOOD CULTURE [[id number]] Collected:  06/14/18 0122    Order Status:  Completed Specimen:  Blood from Peripheral Updated:  06/15/18 0753     Significant Indicator NEG     Source BLD     Site PERIPHERAL     Blood Culture No Growth    Note: Blood cultures are incubated for 5 days and  are monitored continuously.Positive blood cultures  are called to the RN and reported as soon as  they are identified.      Narrative:       Collected By:[id number] MARION GODOY  Per Hospital Policy: Only change Specimen Src: to \"Line\" if  specified by physician order.    BLOOD CULTURE [[id number]] Collected:  06/14/18 0122    Order Status:  Completed Specimen:  Blood from Peripheral Updated:  06/15/18 0753     Significant Indicator NEG     Source BLD     Site PERIPHERAL     Blood Culture No Growth    Note: Blood cultures are incubated for 5 days and  are monitored continuously.Positive blood cultures  are called to the RN and reported as soon as  they are identified.      " "Narrative:       Collected By:[id number] MARION GARCIABolivar  Per Hospital Policy: Only change Specimen Src: to \"Line\" if  specified by physician order.    CSF CULTURE [[id number]]     Order Status:  No result Specimen:  CSF from Tap     HSV 1/2 BY PCR(HERPES) [[id number]]     Order Status:  No result Specimen:  CSF from Spinal Fluid     URINALYSIS [[id number]]  (Abnormal) Collected:  06/13/18 1953    Order Status:  Completed Specimen:  Urine Updated:  06/13/18 2042     Color Yellow     Character Clear     Specific Gravity 1.022     Ph 5.0     Glucose Negative mg/dL      Ketones 40 (A) mg/dL      Protein Negative mg/dL      Bilirubin Negative     Urobilinogen, Urine 0.2     Nitrite Negative     Leukocyte Esterase Negative     Occult Blood Negative     Micro Urine Req see below     Comment: Microscopic examination not performed when specimen is clear  and chemically negative for protein, blood, leukocyte esterase  and nitrite.         URINALYSIS CULTURE, IF INDICATED [[id number]] Collected:  06/13/18 0000    Order Status:  Canceled Specimen:  Urine     Culture Respiratory without Gram Stain [[id number]] Collected:  06/13/18 0000    Order Status:  Canceled Specimen:  Sputum from Sputum           Assessment:  Active Hospital Problems    Diagnosis   • *Encephalopathy acute [G93.40]   • Acute respiratory failure with hypoxemia (HCC) [J96.01]   • Seizures (HCC) [R56.9]   • Atelectasis [J98.11]   • Electrolyte abnormality [E87.8]   • Leukocytosis [D72.829]   • Hydradenitis [L73.2]       Plan:  Encephalopathy-improved  Afebrile  Leukocytosis resolved  Lumbar puncture is not suggestive of bacterial meningitis  CSF cx - neg  Possibly post ictal since patient is now responsive  CSF viral panel neg    Vent dependent respiratory failure-improved  Sputum cultures +MSSA, strep pneumo and Haemophilus  ? colonizers. Procalcitonin is normal  S/p extubation on 6/17  On abx below    Pansinusitis  Continue Rocephin  Can transition to PO Levaquin " at discharge to complete a 2 week course total  Stop date 06/28/18    Seizures, new onset. Now resolved  Patient has family history of seizures as well  Management per neurology    Discussed with internal medicine

## 2018-06-18 NOTE — PROGRESS NOTES
Pulmonary Critical Care Progress Note        Admit: 6/13/2018    Chief Complaint: confusion and difficulty with ambulation    History of Present Illness: 52 y.o. female with a benign past medical history was admitted for acute unspecified encephalopathy from unknown cause, but had a witnessed generalized tonic-clonic seizure in th ER at [facility] who was admitted to the ICU for critical care management and frequent neurological checks.    Review of Systems   Constitutional: Negative for chills and fever.   HENT: Negative for congestion.    Eyes: Negative for blurred vision.   Respiratory: Negative for cough, sputum production and shortness of breath.    Cardiovascular: Negative for chest pain and palpitations.   Gastrointestinal: Negative for abdominal pain, nausea and vomiting.   Genitourinary: Negative for dysuria.   Neurological: Negative for focal weakness.   Psychiatric/Behavioral: Negative for depression.   All other systems reviewed and are negative.    Interval Events:  24 hour interval history reviewed   Tm 98.9  -314cc over last 24hr, +8.7L since admit  cxr, per my interpretation, no significant change  K 3.5  Cr 0.46    Bcx 6/14 ngtd  Bal 6/14 + MSSA, H Flu, Strep Pna  Csf 6/15 neg    Ceftriaxone      PFSH:  No change.    Physical Exam   Constitutional: She appears well-developed and well-nourished.   HENT:   Nose: Nose normal.   Mouth/Throat: No oropharyngeal exudate.   Eyes: Conjunctivae are normal. Pupils are equal, round, and reactive to light.   Neck: No tracheal deviation present.   Cardiovascular: Normal rate and regular rhythm.    Pulmonary/Chest: She has no wheezes. She has no rales.   Abdominal: Soft. Bowel sounds are normal. She exhibits no distension.   Musculoskeletal: She exhibits no edema or tenderness.   Neurological: No cranial nerve deficit.   Skin: Skin is warm and dry.   Psychiatric: She has a normal mood and affect.   Nursing note and vitals reviewed.      Respiratory:     Pulse Oximetry:  98 %    HemoDynamics:  Pulse: 61, Heart Rate (Monitored): 60  NIBP: 153/78  CVP (mm Hg): (!) 12 MM HG            Neuro:  GCS Total [place] Coma Score: 15    CT head (reviewed):  FINDINGS: There is no evidence of mass or mass effect. CSF spaces are normal. There is no periventricular chronic small vessel ischemic change present. There is no intracranial hemorrhage seen. The calvarium is intact. There is no scalp injury. There is bilateral maxillary, ethmoid, frontal and sphenoid sinus disease.    MRI:  1.  No evidence of acute territorial infarct, intracranial hemorrhage or mass lesion.  2.  Mild microangiopathic ischemic change versus demyelination or gliosis.  3.  Pansinusitis.    CTA head:  1.  CT angiogram of the [place] within normal limits.  2.  No dural sinus thrombosis appreciated.  3.  Persistent fetal morphology of the bilateral posterior cerebral arteries.  4.  Severe pansinusitis  Fluids:  Intake/Output       06/16/18 0700 - 06/17/18 0659 06/17/18 0700 - 06/18/18 0659 06/18/18 0700 - 06/19/18 0659      3292-2387 0164-3463 Total 7089-7464 2055-0313 Total 5094-3930 4044-2120 Total       Intake    I.V.  1716  1859.1 3575  850.2  295 1145.2  --  -- --    Propofol Volume 316 324.1 640 30.2 -- 30.2 -- -- --    IV Piggyback Volume (IV Piggyback) 200 100 300 200 100 300 -- -- --    IV Volume (20k 0.9Ns) 1200 1200 2400 400 -- 400 -- -- --    IV Volume (NS TKO) -- 235 235 220 195 415 -- -- --    Other  20  90 110  30  60 90  --  -- --    Medications (P.O./ Enteral Liquids) 20 90 110 30 60 90 -- -- --    Enteral  60  720 780  630  720 1350  --  -- --    Free Water / Tube Flush 60 120 180 30 120 150 -- -- --    Intake (mL) (Enteral Tube 10 Right Nare) -- 600 600  -- -- --    Total Intake 1796 2669.1 4465 1510.2 1075 2585.2 -- -- --       Output    Urine  2950  1848 4795  1700  1200 2900  --  -- --    Output (mL) (Urinary Catheter Indwelling Catheter 16) 2950 1845 4795 1700 1200 2900 -- -- --     Stool  --  -- --  --  -- --  --  -- --    Number of Times Stooled 0 x 0 x 0 x 1 x 0 x 1 x -- -- --    Total Output 2950 1845 4795 1700 1200 2900 -- -- --       Net I/O     -1154 824.1 -330 -189.8 -125 -314.8 -- -- --        Weight: 91.5 kg (201 lb 11.5 oz)  Recent Labs      18   0518   0450   SODIUM  139  140  140   POTASSIUM  3.6  3.6  3.5*   CHLORIDE  112  112  108   CO2  20  21  25   BUN  8  10  10   CREATININE  0.68  0.62  0.46*   MAGNESIUM  1.6  1.6   --    PHOSPHORUS  2.5  3.4   --    CALCIUM  8.1*  8.6  8.2*       GI/Nutrition:    Liver Function  Recent Labs      18   045   ALTSGPT   --    --   19   ASTSGOT   --    --   17   ALKPHOSPHAT   --    --   38   TBILIRUBIN   --    --   0.3   PREALBUMIN   --    --   17.0*   GLUCOSE  103*  113*  128*       Heme:  Recent Labs      18   0450   RBC  3.18*  3.36*  3.50*   HEMOGLOBIN  10.3*  10.7*  11.1*   HEMATOCRIT  29.4*  31.2*  31.9*   PLATELETCT  157*  184  203       Infectious Disease:  Temp  Av.1 °C (98.7 °F)  Min: 36.9 °C (98.5 °F)  Max: 37.2 °C (98.9 °F)  Micro: reviewed  Recent Labs      18   0618   0530  18   0450   WBC  8.9  7.6  7.4   ASTSGOT   --    --   17   ALTSGPT   --    --   19   ALKPHOSPHAT   --    --   38   TBILIRUBIN   --    --   0.3     Current Facility-Administered Medications   Medication Dose Frequency Provider Last Rate Last Dose   • oxyCODONE immediate-release (ROXICODONE) tablet 5 mg  5 mg Q6HRS PRN Alli Torres M.D.   5 mg at 18 0447   • cefTRIAXone (ROCEPHIN) 2 g in  mL IVPB  2 g Q24HRS Naina Sommer M.D.   Stopped at 18 1029   • ziprasidone (GEODON) capsule 20 mg  20 mg BID PRN Anderson Mendes M.D.       • enoxaparin (LOVENOX) inj 40 mg  40 mg DAILY Alli Torres M.D.   40 mg at 18 0959   • acetaminophen (TYLENOL) suppository 650 mg  650 mg Q6HRS PRN Puneet Monahan  Johan Tipton M.D.   650 mg at 06/14/18 0122   • levETIRAcetam (KEPPRA) 1,500 mg in  mL IVPB  1,500 mg Q12HRS Anderson Mendes M.D.   Stopped at 06/17/18 2111   • Respiratory Care per Protocol   Continuous RT Puneet Woo M.D.       • senna-docusate (PERICOLACE or SENOKOT S) 8.6-50 MG per tablet 2 Tab  2 Tab BID Puneet Woo M.D.   2 Tab at 06/17/18 0959    And   • polyethylene glycol/lytes (MIRALAX) PACKET 1 Packet  1 Packet QDAY PRN Puneet Woo M.D.   1 Packet at 06/16/18 2059    And   • magnesium hydroxide (MILK OF MAGNESIA) suspension 30 mL  30 mL QDAY PRN Puneet Woo M.D.        And   • bisacodyl (DULCOLAX) suppository 10 mg  10 mg QDAY PRN Puneet Woo M.D.       • MD ALERT...Adult ICU Electrolyte Replacement per Pharmacy Protocol   pharmacy to dose Puneet Woo M.D.       • Pharmacy Consult: Enteral tube feeding - review meds/change route/product selection  1 Each PRN Puneet Woo M.D.       • fentaNYL (SUBLIMAZE) injection 25 mcg  25 mcg Q HOUR PRN Puneet Woo M.D.        Or   • fentaNYL (SUBLIMAZE) injection 50 mcg  50 mcg Q HOUR PRN Puneet Woo M.D.   50 mcg at 06/15/18 0953    Or   • fentaNYL (SUBLIMAZE) injection 100 mcg  100 mcg Q HOUR PRN Puneet Woo M.D.       • ipratropium-albuterol (DUONEB) nebulizer solution  3 mL Q2HRS PRN (RT) Puneet Woo M.D.       • LORazepam (ATIVAN) injection 2-4 mg  2-4 mg Q HOUR PRN Puneet Woo M.D.       • acetaminophen (TYLENOL) tablet 650 mg  650 mg Q6HRS PRN Maurilio Schumacher M.D.   650 mg at 06/18/18 0026   • ondansetron (ZOFRAN) syringe/vial injection 4 mg  4 mg Q4HRS PRILYA Schumacher M.D.   4 mg at 06/18/18 0503   • ondansetron (ZOFRAN ODT) dispertab 4 mg  4 mg Q4HRS PRN Maurilio Schumacher M.D.       • promethazine (PHENERGAN) tablet 12.5-25 mg  12.5-25 mg Q4HRS PRILYA Schumacher M.D.       • promethazine (PHENERGAN)  suppository 12.5-25 mg  12.5-25 mg Q4HRS PRN Maurilio Schumacher M.D.       • prochlorperazine (COMPAZINE) injection 5-10 mg  5-10 mg Q4HRS PRN Maurilio Schumacher M.D.         Last reviewed on 6/13/2018  4:43 PM by iPna Th[facility]    Quality  Measures:  Radiology images reviewed, Labs reviewed and Medications reviewed                      Problems/Plan:    Acute Hypoxic Respiratory Failure   - intubated on 6/14-17   - cont RT/O2 protocols   - s/p bronch with BAL on 6/14 with MSSA, H flu, and strep pneumonia:      - Cont C3   - monitor volume balance    Acute Unspecified Encephalopathy   - CT head negative   - UDS negative as well as metabolic panel   - ? Related to seizures   - ? Viral etiology   - cont ceftriaxone, and ID stopped vanco/acyclovir on 6/16   - CTA head negative   - MRI head negative   - s/p LP 6/15 thus far (-) cultures    Acute Generalized Tonic-Clonic Seizure   - keppra 1500 q12   - cont seizure precautions   - s/p LP 6/15   - negative MRI brain    Pansinusitis   - noted on CT head and MRI brain   - cont ceftriaxone    Hypokalemia   - I am replacing to keep > 4.0    Hypomagnesemia   - I am replacing to keep > 2.0    Acute Leukocytosis   - resolved       Discussed patient condition and risk of morbidity and/or mortality with Family, RN, RT, Therapies, Pharmacy, Dietary, , Charge nurse / hot rounds, Patient and hospitalist.

## 2018-06-18 NOTE — THERAPY
"Occupational Therapy Evaluation completed.   Functional Status:  MAX assist SPT from EOB to chair. MOD assist LB dressing.   Plan of Care: Will benefit from Occupational Therapy 3 times per week  Discharge Recommendations:  Equipment: Shower Chair and Bedside Commode. Post-acute therapy Discharge to a transitional care facility for continued skilled therapy services.    Pt is a 53 y/o female admitted with AMS & encephalopathy post seizure, tachycardia, parasinusitis, and respiratory failure post intubation with extubation 6/17/18. Pt currently in RICU. Spouse and daughter present for eval. Pt currently demonstrating generalized weakness, B shoulder pain R>L, impaired activity tolerance. Decreased independence in ADLs, balance, weight shifting in standing, and functional mobility. Based on current functional status, recommend d/c to a transitional care for continued therapy post d/c. Pt has excellent family support and may progress to being able to return to home with family assist and home healthcare therapy. Will need equipment prior to d/c if return to home. Pt would benefit from continued skilled OT to maximize safety and independence in ADLs and mobility prior to d/c.     See \"Rehab Therapy-Acute\" Patient Summary Report for complete documentation.    "

## 2018-06-18 NOTE — CARE PLAN
Problem: Safety  Goal: Will remain free from injury  Patient provided fall safety education, use of the call light. Patient verbalizes an understanding.     Problem: Mobility  Goal: Risk for activity intolerance will decrease  Patient mobilized with PT/OT to chair. Patient tolerated well however has generalized weakness.

## 2018-06-19 ENCOUNTER — APPOINTMENT (OUTPATIENT)
Dept: RADIOLOGY | Facility: MEDICAL CENTER | Age: 53
DRG: 987 | End: 2018-06-19
Attending: HOSPITALIST
Payer: COMMERCIAL

## 2018-06-19 PROBLEM — R13.13 PHARYNGEAL DYSPHAGIA: Status: ACTIVE | Noted: 2018-06-19

## 2018-06-19 LAB
ANION GAP SERPL CALC-SCNC: 10 MMOL/L (ref 0–11.9)
BACTERIA BLD CULT: NORMAL
BACTERIA BLD CULT: NORMAL
BUN SERPL-MCNC: 9 MG/DL (ref 8–22)
CALCIUM SERPL-MCNC: 8.7 MG/DL (ref 8.5–10.5)
CHLORIDE SERPL-SCNC: 105 MMOL/L (ref 96–112)
CO2 SERPL-SCNC: 25 MMOL/L (ref 20–33)
CREAT SERPL-MCNC: 0.51 MG/DL (ref 0.5–1.4)
ERYTHROCYTE [DISTWIDTH] IN BLOOD BY AUTOMATED COUNT: 47 FL (ref 35.9–50)
GLUCOSE SERPL-MCNC: 90 MG/DL (ref 65–99)
HCT VFR BLD AUTO: 32.1 % (ref 37–47)
HGB BLD-MCNC: 11.3 G/DL (ref 12–16)
MAGNESIUM SERPL-MCNC: 1.9 MG/DL (ref 1.5–2.5)
MCH RBC QN AUTO: 31.8 PG (ref 27–33)
MCHC RBC AUTO-ENTMCNC: 35.2 G/DL (ref 33.6–35)
MCV RBC AUTO: 90.4 FL (ref 81.4–97.8)
PLATELET # BLD AUTO: 217 K/UL (ref 164–446)
PMV BLD AUTO: 8.5 FL (ref 9–12.9)
POTASSIUM SERPL-SCNC: 3.6 MMOL/L (ref 3.6–5.5)
RBC # BLD AUTO: 3.55 M/UL (ref 4.2–5.4)
SIGNIFICANT IND 70042: NORMAL
SIGNIFICANT IND 70042: NORMAL
SITE SITE: NORMAL
SITE SITE: NORMAL
SODIUM SERPL-SCNC: 140 MMOL/L (ref 135–145)
SOURCE SOURCE: NORMAL
SOURCE SOURCE: NORMAL
WBC # BLD AUTO: 7.8 K/UL (ref 4.8–10.8)

## 2018-06-19 PROCEDURE — 700111 HCHG RX REV CODE 636 W/ 250 OVERRIDE (IP): Performed by: HOSPITALIST

## 2018-06-19 PROCEDURE — 36415 COLL VENOUS BLD VENIPUNCTURE: CPT

## 2018-06-19 PROCEDURE — 85027 COMPLETE CBC AUTOMATED: CPT

## 2018-06-19 PROCEDURE — 700111 HCHG RX REV CODE 636 W/ 250 OVERRIDE (IP): Performed by: INTERNAL MEDICINE

## 2018-06-19 PROCEDURE — G8997 SWALLOW GOAL STATUS: HCPCS | Mod: CH

## 2018-06-19 PROCEDURE — 92612 ENDOSCOPY SWALLOW (FEES) VID: CPT

## 2018-06-19 PROCEDURE — 770006 HCHG ROOM/CARE - MED/SURG/GYN SEMI*

## 2018-06-19 PROCEDURE — 700102 HCHG RX REV CODE 250 W/ 637 OVERRIDE(OP): Performed by: INTERNAL MEDICINE

## 2018-06-19 PROCEDURE — 99232 SBSQ HOSP IP/OBS MODERATE 35: CPT | Performed by: INTERNAL MEDICINE

## 2018-06-19 PROCEDURE — A9270 NON-COVERED ITEM OR SERVICE: HCPCS | Performed by: HOSPITALIST

## 2018-06-19 PROCEDURE — 700111 HCHG RX REV CODE 636 W/ 250 OVERRIDE (IP): Performed by: PSYCHIATRY & NEUROLOGY

## 2018-06-19 PROCEDURE — 700105 HCHG RX REV CODE 258: Performed by: INTERNAL MEDICINE

## 2018-06-19 PROCEDURE — G8996 SWALLOW CURRENT STATUS: HCPCS | Mod: CL

## 2018-06-19 PROCEDURE — 700105 HCHG RX REV CODE 258: Performed by: PSYCHIATRY & NEUROLOGY

## 2018-06-19 PROCEDURE — A9270 NON-COVERED ITEM OR SERVICE: HCPCS | Performed by: INTERNAL MEDICINE

## 2018-06-19 PROCEDURE — 700102 HCHG RX REV CODE 250 W/ 637 OVERRIDE(OP): Performed by: HOSPITALIST

## 2018-06-19 PROCEDURE — 83735 ASSAY OF MAGNESIUM: CPT

## 2018-06-19 PROCEDURE — 80048 BASIC METABOLIC PNL TOTAL CA: CPT

## 2018-06-19 PROCEDURE — 97530 THERAPEUTIC ACTIVITIES: CPT

## 2018-06-19 PROCEDURE — 99232 SBSQ HOSP IP/OBS MODERATE 35: CPT | Performed by: HOSPITALIST

## 2018-06-19 RX ORDER — ZIPRASIDONE HYDROCHLORIDE 20 MG/1
20 CAPSULE ORAL 2 TIMES DAILY PRN
Status: DISCONTINUED | OUTPATIENT
Start: 2018-06-19 | End: 2018-06-21 | Stop reason: HOSPADM

## 2018-06-19 RX ORDER — AMOXICILLIN 250 MG
2 CAPSULE ORAL 2 TIMES DAILY
Status: DISCONTINUED | OUTPATIENT
Start: 2018-06-19 | End: 2018-06-21 | Stop reason: HOSPADM

## 2018-06-19 RX ORDER — POLYETHYLENE GLYCOL 3350 17 G/17G
1 POWDER, FOR SOLUTION ORAL
Status: DISCONTINUED | OUTPATIENT
Start: 2018-06-19 | End: 2018-06-21 | Stop reason: HOSPADM

## 2018-06-19 RX ORDER — PROMETHAZINE HYDROCHLORIDE 25 MG/1
12.5-25 TABLET ORAL EVERY 4 HOURS PRN
Status: DISCONTINUED | OUTPATIENT
Start: 2018-06-19 | End: 2018-06-21 | Stop reason: HOSPADM

## 2018-06-19 RX ORDER — ACETAMINOPHEN 325 MG/1
650 TABLET ORAL EVERY 6 HOURS PRN
Status: DISCONTINUED | OUTPATIENT
Start: 2018-06-19 | End: 2018-06-21 | Stop reason: HOSPADM

## 2018-06-19 RX ORDER — OXYCODONE HYDROCHLORIDE 5 MG/1
5 TABLET ORAL EVERY 6 HOURS PRN
Status: DISCONTINUED | OUTPATIENT
Start: 2018-06-19 | End: 2018-06-21 | Stop reason: HOSPADM

## 2018-06-19 RX ORDER — BISACODYL 10 MG
10 SUPPOSITORY, RECTAL RECTAL
Status: DISCONTINUED | OUTPATIENT
Start: 2018-06-19 | End: 2018-06-21 | Stop reason: HOSPADM

## 2018-06-19 RX ORDER — SODIUM CHLORIDE 9 MG/ML
INJECTION, SOLUTION INTRAVENOUS CONTINUOUS
Status: DISCONTINUED | OUTPATIENT
Start: 2018-06-19 | End: 2018-06-21 | Stop reason: HOSPADM

## 2018-06-19 RX ADMIN — OXYCODONE HYDROCHLORIDE 5 MG: 5 TABLET ORAL at 22:13

## 2018-06-19 RX ADMIN — SODIUM CHLORIDE: 9 INJECTION, SOLUTION INTRAVENOUS at 01:15

## 2018-06-19 RX ADMIN — SODIUM CHLORIDE: 9 INJECTION, SOLUTION INTRAVENOUS at 00:12

## 2018-06-19 RX ADMIN — Medication 1 LOZENGE: at 19:00

## 2018-06-19 RX ADMIN — SODIUM CHLORIDE 1500 MG: 9 INJECTION, SOLUTION INTRAVENOUS at 08:43

## 2018-06-19 RX ADMIN — CEFTRIAXONE 2 G: 2 INJECTION, POWDER, FOR SOLUTION INTRAMUSCULAR; INTRAVENOUS at 08:43

## 2018-06-19 RX ADMIN — ACETAMINOPHEN 650 MG: 650 SUPPOSITORY RECTAL at 01:26

## 2018-06-19 RX ADMIN — SODIUM CHLORIDE 1500 MG: 9 INJECTION, SOLUTION INTRAVENOUS at 19:55

## 2018-06-19 RX ADMIN — OXYCODONE HYDROCHLORIDE 5 MG: 5 TABLET ORAL at 06:03

## 2018-06-19 RX ADMIN — ONDANSETRON 4 MG: 2 INJECTION INTRAMUSCULAR; INTRAVENOUS at 22:13

## 2018-06-19 RX ADMIN — SODIUM CHLORIDE: 9 INJECTION, SOLUTION INTRAVENOUS at 15:10

## 2018-06-19 RX ADMIN — ACETAMINOPHEN 650 MG: 650 SUPPOSITORY RECTAL at 08:52

## 2018-06-19 RX ADMIN — ENOXAPARIN SODIUM 40 MG: 100 INJECTION SUBCUTANEOUS at 08:43

## 2018-06-19 RX ADMIN — HYDROCORTISONE: 25 CREAM TOPICAL at 19:56

## 2018-06-19 RX ADMIN — OXYCODONE HYDROCHLORIDE 5 MG: 5 TABLET ORAL at 00:12

## 2018-06-19 ASSESSMENT — PAIN SCALES - GENERAL
PAINLEVEL_OUTOF10: 4
PAINLEVEL_OUTOF10: 7
PAINLEVEL_OUTOF10: 8
PAINLEVEL_OUTOF10: 3
PAINLEVEL_OUTOF10: 7

## 2018-06-19 ASSESSMENT — GAIT ASSESSMENTS
ASSISTIVE DEVICE: FRONT WHEEL WALKER
DEVIATION: ANTALGIC;BRADYKINETIC
DISTANCE (FEET): 150
GAIT LEVEL OF ASSIST: STAND BY ASSIST

## 2018-06-19 ASSESSMENT — ENCOUNTER SYMPTOMS
COUGH: 0
CONSTITUTIONAL NEGATIVE: 1
COUGH: 1
SPEECH CHANGE: 0
STRIDOR: 0
CHILLS: 0
CARDIOVASCULAR NEGATIVE: 1
MYALGIAS: 0
BRUISES/BLEEDS EASILY: 0
NAUSEA: 0
ABDOMINAL PAIN: 0
BLOOD IN STOOL: 0
SINUS PAIN: 0
SENSORY CHANGE: 0
HEARTBURN: 0
GASTROINTESTINAL NEGATIVE: 1
VOMITING: 0
HEMOPTYSIS: 0
WEAKNESS: 0
DIZZINESS: 0
FEVER: 0
ORTHOPNEA: 0
EYE DISCHARGE: 0
DIARRHEA: 0
EYE PAIN: 0
CONSTIPATION: 0
EYES NEGATIVE: 1
MEMORY LOSS: 0
HEADACHES: 1
LOSS OF CONSCIOUSNESS: 0
DOUBLE VISION: 0
WHEEZING: 0
PSYCHIATRIC NEGATIVE: 1
NERVOUS/ANXIOUS: 0
HEADACHES: 0
RESPIRATORY NEGATIVE: 1
FOCAL WEAKNESS: 0
SORE THROAT: 0
SEIZURES: 0

## 2018-06-19 ASSESSMENT — COGNITIVE AND FUNCTIONAL STATUS - GENERAL
STANDING UP FROM CHAIR USING ARMS: A LITTLE
WALKING IN HOSPITAL ROOM: A LITTLE
MOVING FROM LYING ON BACK TO SITTING ON SIDE OF FLAT BED: A LITTLE
MOBILITY SCORE: 18
SUGGESTED CMS G CODE MODIFIER MOBILITY: CK
CLIMB 3 TO 5 STEPS WITH RAILING: A LITTLE
MOVING TO AND FROM BED TO CHAIR: A LITTLE
TURNING FROM BACK TO SIDE WHILE IN FLAT BAD: A LITTLE

## 2018-06-19 NOTE — PROGRESS NOTES
[facility] Hospitalist Progress Note    Date of Service: 6/19/2018    Chief Complaint  52 y.o. female admitted 6/13/2018 with altered level of consciousness    Interval Problem Update  Patient initially was admitted with a seizure that has led her to respiratory compromise as well as altered level of consciousness. Since then the patient has improved and is being liberated off the ventilator on 6/17/2018. Patient this point remains in the hospital with difficulty with speech as well as physical therapy and occupational therapy needs. I spoke with the family especially since [place]. The patient at this point would benefit definitely from inpatient physical therapy and occupational therapy as well as speech therapy. At this point the patient otherwise is improving.    Consultants/Specialty  Neurology, pulmonary medicine    Disposition  To be determined        Review of Systems   Constitutional: Negative.  Negative for chills, fever and malaise/fatigue.   HENT: Negative.  Negative for hearing loss and sore throat.    Eyes: Negative.  Negative for double vision, pain and discharge.   Respiratory: Negative.  Negative for cough, hemoptysis, wheezing and stridor.    Cardiovascular: Negative.  Negative for chest pain, orthopnea and leg swelling.   Gastrointestinal: Negative.  Negative for abdominal pain, blood in stool, constipation, diarrhea, heartburn, nausea and vomiting.   Genitourinary: Negative.  Negative for frequency, hematuria and urgency.   Musculoskeletal: Positive for joint pain (both shoulders).   Skin: Negative.    Neurological: Positive for headaches. Negative for dizziness, focal weakness, seizures, loss of consciousness and weakness.   Endo/Heme/Allergies: Negative.  Does not bruise/bleed easily.   Psychiatric/Behavioral: Negative.  Negative for memory loss and suicidal ideas. The patient is not nervous/anxious.    All other systems reviewed and are negative.     Physical Exam   Laboratory/Imaging   Hemodynamics  Temp (24hrs), Av.8 °C (98.3 °F), Min:36.3 °C (97.4 °F), Max:37.3 °C (99.1 °F)   Temperature: 36.8 °C (98.3 °F)  Pulse  Av.2  Min: 50  Max: 119    Blood Pressure: 145/63     Respiratory      Respiration: 17, Pulse Oximetry: 90 %     Work Of Breathing / Effort: Mild  RUL Breath Sounds: Diminished, RML Breath Sounds: Diminished, RLL Breath Sounds: Diminished, WENDY Breath Sounds: Diminished, LLL Breath Sounds: Diminished    Fluids    Intake/Output Summary (Last 24 hours) at 18 1624  Last data filed at 18 0400   Gross per 24 hour   Intake              420 ml   Output                0 ml   Net              420 ml       Nutrition  Orders Placed This Encounter   Procedures   • Diet NPO     Standing Status:   Standing     Number of Occurrences:   1     Order Specific Question:   Type:     Answer:   Now [1]     Order Specific Question:   Restrict to:     Answer:   Strict [1]     Physical Exam   Constitutional: She is oriented to person, place, and time. She appears well-developed and well-nourished.   HENT:   Head: Normocephalic and atraumatic.   Right Ear: External ear normal.   Left Ear: External ear normal.   Nose: Nose normal.   Mouth/Throat: Oropharynx is clear and moist.   Eyes: Conjunctivae and EOM are normal. Pupils are equal, round, and reactive to light.   Neck: Normal range of motion. Neck supple. No JVD present. No thyromegaly present.   Cardiovascular: Normal rate and regular rhythm.    No murmur heard.  Pulmonary/Chest: Effort normal and breath sounds normal. She has no wheezes. She has no rales. She exhibits no tenderness.   Abdominal: Soft. Bowel sounds are normal. She exhibits no distension and no mass. There is no tenderness. There is no rebound and no guarding.   Musculoskeletal: Normal range of motion. She exhibits no edema or tenderness.   Lymphadenopathy:     She has no cervical adenopathy.   Neurological: She is alert and oriented to person, place,  and time. She has normal reflexes. No cranial nerve deficit.   Skin: Skin is warm and dry. No rash noted. No erythema.   Psychiatric: She has a normal mood and affect. Her speech is normal. Judgment normal. She is slowed. Cognition and memory are normal.   Nursing note and vitals reviewed.      Recent Labs      06/17/18   0530  06/18/18   0450  06/19/18   0500   WBC  7.6  7.4  7.8   RBC  3.36*  3.50*  3.55*   HEMOGLOBIN  10.7*  11.1*  11.3*   HEMATOCRIT  31.2*  31.9*  32.1*   MCV  92.9  91.1  90.4   MCH  31.8  31.7  31.8   MCHC  34.3  34.8  35.2*   RDW  50.6*  47.8  47.0   PLATELETCT  184  203  217   MPV  8.8*  8.8*  8.5*     Recent Labs      06/17/18   0530  06/18/18   0450  06/19/18   0500   SODIUM  140  140  140   POTASSIUM  3.6  3.5*  3.6   CHLORIDE  112  108  105   CO2  21  25  25   GLUCOSE  113*  128*  90   BUN  10  10  9   CREATININE  0.62  0.46*  0.51   CALCIUM  8.6  8.2*  8.7                      Assessment/Plan     * Encephalopathy acute   Assessment & Plan    Encephalopathy at this point is resolving most likely secondary to the seizure disorder.            Acute respiratory failure with hypoxemia (HCC)   Assessment & Plan    The patient was able to be liberated from the ventilator and 6/17/2018. The patient remains on room air at this point        Seizures (HCC)   Assessment & Plan    Patient's initial seizure was after that the patient got extremely dehydrated. The patient tells me that her father and her sister have suffered grand mal seizures in the past. On EEG there was no specific focus. Patient has been placed on Keppra. Because of the initial seizure the patient had to be mechanically intubated and ventilated.        Atelectasis   Assessment & Plan    Patient's atelectasis at this point has improved continue with nebulizers as well as incentive spirometry.        Pharyngeal dysphagia   Assessment & Plan          Patient on images at this point shows that she does have vocal cord edema as well as  damage to the vocal cords bilaterally causing her at this point to have abnormal swallowing. The patient remains at this point with a core track and she was unable to pass the swallowing evaluation today.        Electrolyte abnormality   Assessment & Plan    Electrolytes at this point have been corrected monitor            Hydradenitis   Assessment & Plan    The patient at this point will need some topical cortisone cream which she uses chronically.          Quality-Core Measures   Reviewed items::  EKG reviewed, Labs reviewed, Medications reviewed and Radiology images reviewed  Sam catheter::  No Sam  DVT prophylaxis pharmacological::  Enoxaparin (Lovenox)  Ulcer Prophylaxis::  Not indicated  Antibiotics:  Treating active infection/contamination beyond 24 hours perioperative coverage  Assessed for rehabilitation services:  Patient was assess for and/or received rehabilitation services during this hospitalization

## 2018-06-19 NOTE — CARE PLAN
Problem: Venous Thromboembolism (VTW)/Deep Vein Thrombosis (DVT) Prevention:  Goal: Patient will participate in Venous Thrombosis (VTE)/Deep Vein Thrombosis (DVT)Prevention Measures  Outcome: PROGRESSING AS EXPECTED  Lovenox in use.     Problem: Pain Management  Goal: Pain level will decrease to patient's comfort goal  Outcome: PROGRESSING AS EXPECTED  Pt complaining of headache.  Tylenol suppository in use for pain    Problem: Mobility  Goal: Risk for activity intolerance will decrease  Outcome: PROGRESSING AS EXPECTED  Pt is up with one assist.

## 2018-06-19 NOTE — PROGRESS NOTES
Infectious Disease Progress Note    Author: Naina Sommer M.D. Date & Time of service: 2018  10:42 AM    Chief Complaint:  FU Altered mental status    Interval History:  2018 MAXIMUM TEMPERATURE 100.4 and remains on the ventilator FiO2 40% WBC 9 platelets 157 patient is much more responsive. Following commands  2018 patient has been extubated. MAXIMUM TEMPERATURE 99. WBCs 7.6 platelets 184 feels  much better   AF WBC 7.4 has a sore throat and diffuse pain, alert and oriented  2018 MAXIMUM TEMPERATURE 99.1 has slight cough denies any new issues  Labs Reviewed, Medications Reviewed and Radiology Reviewed.    Review of Systems:  Review of Systems   Constitutional: Positive for malaise/fatigue. Negative for chills and fever.   HENT: Negative for congestion, sinus pain and sore throat.    Respiratory: Positive for cough.    Cardiovascular: Negative for chest pain.   Gastrointestinal: Negative for nausea and vomiting.   Genitourinary: Negative for dysuria.   Musculoskeletal: Negative for myalgias.   Neurological: Negative for sensory change, speech change, seizures and headaches.       Hemodynamics:  Temp (24hrs), Av.9 °C (98.4 °F), Min:36.3 °C (97.4 °F), Max:37.3 °C (99.1 °F)  Temperature: 36.8 °C (98.3 °F)  Pulse  Av.2  Min: 50  Max: 119Heart Rate (Monitored): 63  Blood Pressure: 145/63, NIBP: 139/68      Physical Exam:  Physical Exam   Constitutional: She is oriented to person, place, and time. She appears well-developed and well-nourished.   HENT:   Head: Normocephalic and atraumatic.   Mouth/Throat: No oropharyngeal exudate.   No sinus tenderness   Eyes: EOM are normal. Pupils are equal, round, and reactive to light. No scleral icterus.   Neck:   R IJ catheter   Cardiovascular: Normal rate and regular rhythm.    No murmur heard.  Pulmonary/Chest: Effort normal. She has no wheezes. She has no rales.   Abdominal: Soft. There is no tenderness. There is no rebound.   Genitourinary:    Genitourinary Comments: Sam   Musculoskeletal: She exhibits no edema.   Neurological: She is alert and oriented to person, place, and time.   Skin: No erythema.   Vitals reviewed.      Meds:    Current Facility-Administered Medications:   •  NS  •  menthol  •  oxyCODONE immediate-release  •  cefTRIAXone (ROCEPHIN) IV  •  ziprasidone  •  enoxaparin (LOVENOX) injection  •  acetaminophen  •  levETIRAcetam (KEPPRA) IV  •  Respiratory Care per Protocol  •  senna-docusate **AND** polyethylene glycol/lytes **AND** magnesium hydroxide **AND** bisacodyl  •  MD ALERT...Adult ICU Electrolyte Replacement per Pharmacy Protocol  •  Action is required: Protocol 751 Tube Feeding Enteral Feeding has been implemented **AND** Protocol 751 Document tube feeding in I&O doc flow sheet **AND** Protocol 751 elevate HOB **AND** Protocol 751 Nutrition (Dietary) Consult **AND** Weigh Patient **AND** Protocol 751 Tube Placement **AND** [CANCELED] Protocol 751 Diet Tube Feeding **AND** Protocol 751 Instructions **AND** Protocol 751 Goal Rate **AND** Protocol 751 Start Rate **AND** Protocol 751 Order to be Separately Placed by RN **AND** CRP Quantitative (Non-Cardiac) **AND** Prealbumin **AND** Pharmacy **AND** Protocol 751 Nursing Communication Tube Occlusion **AND** Protocol 751 Tube Maintenance  •  ipratropium-albuterol  •  LORazepam  •  acetaminophen  •  ondansetron  •  ondansetron  •  promethazine  •  promethazine  •  prochlorperazine    Labs:  Recent Labs      06/17/18   0530  06/18/18   0450  06/19/18   0500   WBC  7.6  7.4  7.8   RBC  3.36*  3.50*  3.55*   HEMOGLOBIN  10.7*  11.1*  11.3*   HEMATOCRIT  31.2*  31.9*  32.1*   MCV  92.9  91.1  90.4   MCH  31.8  31.7  31.8   RDW  50.6*  47.8  47.0   PLATELETCT  184  203  217   MPV  8.8*  8.8*  8.5*     Recent Labs      06/17/18   0530  06/18/18   0450  06/19/18   0500   SODIUM  140  140  140   POTASSIUM  3.6  3.5*  3.6   CHLORIDE  112  108  105   CO2  21  25  25   GLUCOSE  113*  128*  90    BUN  10  10  9     Recent Labs      06/17/18   0530  06/18/18   0450  06/19/18   0500   ALBUMIN   --   3.2   --    TBILIRUBIN   --   0.3   --    ALKPHOSPHAT   --   38   --    TOTPROTEIN   --   5.7*   --    ALTSGPT   --   19   --    ASTSGOT   --   17   --    CREATININE  0.62  0.46*  0.51       Imaging:  Ct-cta Head With & W/o-post Process    Result Date: 6/14/2018 6/14/2018 9:13 PM HISTORY/REASON FOR EXAM:  Altered Mental Status TECHNIQUE/EXAM DESCRIPTION: CT angiogram of the [place] without and with contrast.  Initial precontrast images were obtained of the head from the skull base through the vertex.  Postcontrast images were obtained of the [place] following the power injection of nonionic contrast at 5.0 mL/sec. CT venogram images were then obtained. Thin-section helical images were obtained with overlapping reconstruction interval. Coronal and sagittal multiplanar volume reformats were generated.  3D angiographic images were reviewed on PACS.  Maximum intensity projection (MIP) images were generated and reviewed. 100 mL of Omnipaque 350 nonionic contrast was injected intravenously. Low dose optimization technique was utilized for this CT exam including automated exposure control and adjustment of the mA and/or kV according to patient size. COMPARISON:  June 13, 2018. FINDINGS: The posterior circulation shows the distal vertebral arteries to be patent. The vertebrobasilar confluence is intact. The basilar artery is patent. No aneurysm or occlusive lesion is evident. There is persistent fetal morphology of the bilateral posterior cerebral arteries with contribution from the basilar artery. The anterior circulation shows no stenotic or occlusive lesion. No aneurysm is evident about the [place]. The dural sinuses are well opacified without apparent thrombus filling defect. The brain is unremarkable. There is extensive opacification of the bilateral paranasal sinuses.     1.  CT  angiogram of the [place] within normal limits. 2.  No dural sinus thrombosis appreciated. 3.  Persistent fetal morphology of the bilateral posterior cerebral arteries. 4.  Severe pansinusitis    Ct-head W/o    Result Date: 6/13/2018 6/13/2018 3:51 PM HISTORY/REASON FOR EXAM: Active seizure. TECHNIQUE/EXAM DESCRIPTION AND NUMBER OF VIEWS: CT of the head without contrast. Up to date radiation dose reduction adjustments have been utilized to meet ALARA standards for radiation dose reduction. COMPARISON: None available FINDINGS: There is no evidence of mass or mass effect. CSF spaces are normal. There is no periventricular chronic small vessel ischemic change present. There is no intracranial hemorrhage seen. The calvarium is intact. There is no scalp injury. There is bilateral maxillary, ethmoid, frontal and sphenoid sinus disease.     No evidence of acute intracranial process.    Dx-chest-limited (1 View)    Result Date: 6/14/2018 6/14/2018 7:32 PM HISTORY/REASON FOR EXAM:  Line placement, tube placement TECHNIQUE/EXAM DESCRIPTION AND NUMBER OF VIEWS: Single portable view of the chest. COMPARISON: 6/13/2018 FINDINGS: Cardiac mediastinal contour is unchanged. Lungs show hypoinflation. Linear opacities in the LEFT mid and RIGHT lower lung regions. RIGHT internal jugular catheter with tip at the lower SVC, approximately 2 cm above the cavoatrial junction. Endotracheal tube in place with tip approximately 5 cm above the yrn. No pneumothorax. No major bony abnormality is seen.     1.  Supportive tubing as described above. 2.  No pneumothorax. 3.  Hypoinflation with worsening bibasilar atelectasis.    Dx-chest-limited (1 View)    Result Date: 6/13/2018 6/13/2018 4:14 PM HISTORY/REASON FOR EXAM:  Increased shortness of breath TECHNIQUE/EXAM DESCRIPTION AND NUMBER OF VIEWS: Single portable view of the chest. COMPARISON: None FINDINGS: Heart size is within normal limits. No focal infiltrates or consolidations  are identified in the lungs. No pleural fluid collections are identified. No pneumothorax is appreciated.     No acute cardiac or pulmonary abnormality is identified.    Dx-chest-portable (1 View)    Result Date: 6/16/2018 6/16/2018 2:50 AM HISTORY/REASON FOR EXAM: For indication of respiratory failure TECHNIQUE/EXAM DESCRIPTION:  Single AP view of the chest. COMPARISON: Yesterday FINDINGS: Position of medical devices appears stable. The cardiac silhouette appears within normal limits. The mediastinal contour appears within normal limits.  The central pulmonary vasculature appears normal. The lungs appear well expanded bilaterally.  Hazy linear density in the bilateral lung bases is observed. No significant pleural effusions are identified. The bony structures appear age-appropriate.     1.  Bibasilar atelectasis, no focal infiltrate is identified    Dx-chest-portable (1 View)    Result Date: 6/15/2018    6/15/2018 2:09 AM HISTORY/REASON FOR EXAM: For indication of respiratory failure TECHNIQUE/EXAM DESCRIPTION:  Single AP view of the chest. COMPARISON: Yesterday FINDINGS: Position of medical devices appears stable. The cardiac silhouette appears within normal limits. The mediastinal contour appears within normal limits.  The central pulmonary vasculature appears normal. The lungs appear well expanded bilaterally.  Hazy linear density in the bilateral lung bases is observed. No significant pleural effusions are identified. The bony structures appear age-appropriate.     1.  Bibasilar atelectasis, no focal infiltrate is identified    Dx-lumbar Puncture For Diagnosis    Result Date: 6/15/2018  6/15/2018 3:16 PM HISTORY/REASON FOR EXAM:  Altered level subconsciousness. TECHNIQUE/EXAM DESCRIPTION AND NUMBER OF VIEWS: Fluoroscopic-guided lumbar puncture. 1 fluoroscopic images obtained. Fluoroscopy time: 24 seconds PROCEDURE:     Informed consent was obtained. A timeout was taken. With the patient in prone position,  the lumbar region was prepped and draped in a sterile manner. Following local anesthesia with 1% lidocaine, a 22-gauge spinal needle was advanced into the subarachnoid space at the L 4/5 level.  Approximately 9 cc of CSF was collected and the specimens sent to the lab for the studies requested. The patient tolerated the procedure well with no evidence of complication.     Fluoroscopic-guided lumbar puncture as described above.    Mr-brain-with & W/o    Result Date: 6/15/2018  6/14/2018 9:07 PM HISTORY/REASON FOR EXAM:  Seizure. TECHNIQUE/EXAM DESCRIPTION: MRI of the brain and temporal lobes without and with contrast (seizure protocol). The study was performed on a [employer] 1.5 Jenna MRI scanner. Spoiled-GRASS sagittal, thin-section T2 axial, T1 coronal, T1 postcontrast coronal, T1 postcontrast axial, and FLAIR coronal images were obtained of the whole brain. Additional thin-section T2 fast spin-echo oblique images were also obtained to display the temporal lobes and hippocampal formations. 18 mL Omniscan contrast were administered intravenously. COMPARISON:  None. FINDINGS: The cortical sulci and gyri are within normal limits. The ventricular system is within normal limits. The bony calvaria are intact. There is no evidence of extra-axial fluid collection or intracranial mass effect. There is a pattern of mild supratentorial white matter disease with scattered foci of bright T2 and FLAIR signal in the subcortical and deep white matter of both hemispheres consistent with small vessel ischemic change versus demyelination or gliosis. There is no evidence of abnormal diffusion-weighted signal intensity in the brain. There is no evidence of abnormal enhancement in the brain parenchyma. There are normal flow voids in the cavernous carotid arteries and M1 segments. There are normal flow voids in the distal vertebral basilar system. There are normal flow voids in the dural venous sinuses. There is diffuse paranasal sinus  "mucosal thickening. Mastoid air cells are well aerated.     1.  No evidence of acute territorial infarct, intracranial hemorrhage or mass lesion. 2.  Mild microangiopathic ischemic change versus demyelination or gliosis. 3.  Pansinusitis.    Dx-abdomen For Tube Placement    Result Date: 6/15/2018  6/15/2018 3:58 PM HISTORY/REASON FOR EXAM:  Coretrack tube placement. TECHNIQUE/EXAM DESCRIPTION AND NUMBER OF VIEWS:  1 view(s) of the abdomen. COMPARISON:  None. FINDINGS: Enteric tube has been placed. The tip projects over the distal stomach/pylorus. The bowel gas pattern is within normal limits.     Feeding tube tip at the distal stomach/pylorus.      Micro:  Results     Procedure Component Value Units Date/Time    BLOOD CULTURE [[id number]] Collected:  06/14/18 0122    Order Status:  Completed Specimen:  Blood from Peripheral Updated:  06/19/18 0300     Significant Indicator NEG     Source BLD     Site PERIPHERAL     Blood Culture No growth after 5 days of incubation.    Narrative:       Collected By:[id number] [facility] S.  Per Hospital Policy: Only change Specimen Src: to \"Line\" if  specified by physician order.    BLOOD CULTURE [[id number]] Collected:  06/14/18 0122    Order Status:  Completed Specimen:  Blood from Peripheral Updated:  06/19/18 0300     Significant Indicator NEG     Source BLD     Site PERIPHERAL     Blood Culture No growth after 5 days of incubation.    Narrative:       [id number] [facility]  Per [facility] Policy: Only change Specimen Src: to \"Line\" if  specified by physician order.    CSF CULTURE [[id number]] Collected:  06/15/18 1555    Order Status:  Completed Specimen:  CSF Updated:  06/18/18 0848     Gram Stain Result Rare WBCs.  No organisms seen.       Significant Indicator NEG     Source CSF     Site Tap     CSF Culture No growth at 72 hours.    QUANT BRONCHIAL WASHING [[id number]]  (Abnormal)  (Susceptibility) Collected:  06/14/18 1845    Order Status:  Completed Specimen:  " Respirate Updated:  06/18/18 0809     Significant Indicator POS (POS)     Source RESP     Site Quantitative BAL RLL     Quantitative Bronch Washing Growth noted after further incubation, see below for  organism identification.   (A)     Gram Stain Result Moderate WBCs.  Moderate mixed bacteria, no predominant organism seen.  <5% intracellular organisms.       Quantitative Bronch Washing Staphylococcus aureus  3,000 cfu/mL  This isolate is presumed to be clindamycin resistant based on  detection of inducible resistance.  Clindamycin may still  be effective in some patients.   (A)      Haemophilus influenzae (Beta-lactamase positive)  20,000 cfu/mL   (A)      Streptococcus pneumoniae  4,000 cfu/mL  Penicillin sensitive.   (A)    Narrative:       CALL  [place]  [facility] tel. [phone],  CALLED  [facility] tel. [phone] 06/16/2018, 10:46, RB PERF. RESULTS CALLED  TO:[id number] RN (Str pnuemo, Hinf)    Culture & Susceptibility     STAPHYLOCOCCUS AUREUS     Antibiotic Sensitivity Microscan Unit Status    Ampicillin/sulbactam Sensitive <=8/4 mcg/mL Final    Method: SENSITIVITY, CARLOS    Clindamycin Resistant <=0.5 mcg/mL Final    Method: SENSITIVITY, CARLOS    Daptomycin Sensitive <=0.5 mcg/mL Final    Method: SENSITIVITY, CARLOS    Erythromycin Resistant >4 mcg/mL Final    Method: SENSITIVITY, CARLOS    Moxifloxacin Sensitive <=0.5 mcg/mL Final    Method: SENSITIVITY, CARLOS    Oxacillin Sensitive <=0.25 mcg/mL Final    Method: SENSITIVITY, CARLOS    Penicillin Resistant >8 mcg/mL Final    Method: SENSITIVITY, CARLOS    Tetracycline Sensitive <=4 mcg/mL Final    Method: SENSITIVITY, CARLOS    Trimeth/Sulfa Sensitive <=0.5/9.5 mcg/mL Final    Method: SENSITIVITY, CARLOS    Vancomycin Sensitive 1 mcg/mL Final    Method: SENSITIVITY, CARLOS                       AFB CULTURE [[id number]] Collected:  06/14/18 1845    Order Status:  Completed Specimen:  Respirate Updated:  06/18/18 0809     Significant Indicator NEG     Source RESP     Site Quantitative BAL RLL     AFB  Culture Culture in progress.     AFB Smear Results No acid fast bacilli seen.    Narrative:       CALL  [place]  [facility] tel. [phone],  CALLED  [facility] tel. [phone] 06/16/2018, 10:46, RB PERF. RESULTS CALLED  TO:[phone] RN (Str pnuemo, Hinf)    FUNGAL CULTURE [[id number]] Collected:  06/14/18 1845    Order Status:  Completed Specimen:  Respirate Updated:  06/18/18 0809     Significant Indicator NEG     Source RESP     Site Quantitative BAL RLL     Fungal Culture Culture in progress.    Narrative:       CALL  [place]  [facility] tel. [phone],  CALLED  [facility] tel. [phone] 06/16/2018, 10:46, RB PERF. RESULTS CALLED  TO:[phone] RN (Str pnuemo, Hinf)    HSV 1/2 BY PCR(HERPES) [[id number]] Collected:  06/15/18 1555    Order Status:  Completed Specimen:  CSF Updated:  06/16/18 1937     Significant Indicator NEG     Source CSF     Site Tap     HSV 1/2 PCR Negative for HSV Type 1 and 2 by PCR.  NOTE:  This test has not been FDA approved.  It has been validated in the laboratory in accordance  with CLIA guidelines.      ACID FAST STAIN [[id number]] Collected:  06/14/18 1845    Order Status:  Completed Specimen:  Respirate Updated:  06/16/18 0954     Significant Indicator NEG     Source RESP     Site Quantitative BAL RLL     AFB Smear Results No acid fast bacilli seen.    GRAM STAIN [[id number]] Collected:  06/14/18 1845    Order Status:  Completed Specimen:  Respirate Updated:  06/16/18 0954     Significant Indicator .     Source RESP     Site Quantitative BAL RLL     Gram Stain Result Moderate WBCs.  Moderate mixed bacteria, no predominant organism seen.  <5% intracellular organisms.      GRAM STAIN [[id number]] Collected:  06/15/18 1555    Order Status:  Completed Specimen:  CSF Updated:  06/15/18 1719     Significant Indicator .     Source CSF     Site Tap     Gram Stain Result Rare WBCs.  No organisms seen.      CSF CULTURE [[id number]]     Order Status:  No result Specimen:  CSF from Tap     HSV 1/2 BY PCR(HERPES) [[id number]]     Order  Status:  No result Specimen:  CSF from Spinal Fluid     URINALYSIS [[id number]]  (Abnormal) Collected:  06/13/18 1953    Order Status:  Completed Specimen:  Urine Updated:  06/13/18 2042     Color Yellow     Character Clear     Specific Gravity 1.022     Ph 5.0     Glucose Negative mg/dL      Ketones 40 (A) mg/dL      Protein Negative mg/dL      Bilirubin Negative     Urobilinogen, Urine 0.2     Nitrite Negative     Leukocyte Esterase Negative     Occult Blood Negative     Micro Urine Req see below     Comment: Microscopic examination not performed when specimen is clear  and chemically negative for protein, blood, leukocyte esterase  and nitrite.         URINALYSIS CULTURE, IF INDICATED [[id number]] Collected:  06/13/18 0000    Order Status:  Canceled Specimen:  Urine     Culture Respiratory without Gram Stain [[id number]] Collected:  06/13/18 0000    Order Status:  Canceled Specimen:  Sputum from Sputum           Assessment:  Active Hospital Problems    Diagnosis   • *Encephalopathy acute [G93.40]   • Acute respiratory failure with hypoxemia (HCC) [J96.01]   • Seizures (HCC) [R56.9]   • Atelectasis [J98.11]   • Electrolyte abnormality [E87.8]   • Leukocytosis [D72.829]   • Hydradenitis [L73.2]       Plan:  Encephalopathy-improved  Afebrile  Leukocytosis resolved  Lumbar puncture is not suggestive of bacterial meningitis  CSF cx - neg  Possibly post ictal since patient is now responsive  CSF viral panel neg    Vent dependent respiratory failure-improved  Sputum cultures +MSSA, strep pneumo and Haemophilus  ? colonizers. Procalcitonin is normal  S/p extubation on 6/17  On abx below    Pansinusitis  Continue Rocephin  Can transition to PO Levaquin at discharge to complete a 2 week course total  Stop date 06/28/18  Side effects of Levaquin explained    Seizures, new onset. Now resolved  Patient has family history of seizures as well  Management per neurology  ID will sign off. Please call as needed  Discussed with  internal medicine

## 2018-06-19 NOTE — THERAPY
"Physical Therapy Treatment completed.   Bed Mobility:  Supine to Sit: Contact Guard Assist (HOB elevated)  Transfers: Sit to Stand: Stand by Assist  Gait: Level Of Assist: Stand by Assist with Front-Wheel Walker       Plan of Care: Will benefit from Physical Therapy 4 times per week  Discharge Recommendations: Equipment: Will Continue to Assess for Equipment Needs. Post-acute therapy : Uncertain at this time due to unknown dc date and progress between now and dc, likely discharge to home with outpatient or home health for additional skilled therapy services.  Pt demo significantly improved standing tolerance, strength and balance.  Pt will benefit from continued PT services in acute setting for gait, endurance, and safety with mobility, to assess for equipment needs.       See \"Rehab Therapy-Acute\" Patient Summary Report for complete documentation.       "

## 2018-06-19 NOTE — PROGRESS NOTES
Cortrak Placement    Tube Team verified patient name and medical record number prior to tube placement.  Cortrak tube (43 inches, 10 Russian) placed at 65 cm in left nare.  Per Cortrak picture, tube appears to be in the stomach.  Nursing Instructions: Awaiting KUB to confirm placement before use for medications or feeding. Once placement confirmed, flush tube with 30 ml of water, and then remove and save stylet, in patient medication drawer.

## 2018-06-19 NOTE — RESPIRATORY CARE
COPD EDUCATION by COPD CLINICAL EDUCATOR  6/19/2018 at 6:20 AM by Yenni Castro     Patient reviewed by COPD education team. Patient does not qualify for COPD program.

## 2018-06-19 NOTE — ASSESSMENT & PLAN NOTE
Vocal cord damage as well as edema of the area continues to be apparent. The patient still remains on [address] [place] tube feeds and at this point the object is to get her back to [place] where the patient will continue with bolus tube feeds as an outpatient. She will then be followed by her primary care physician.

## 2018-06-19 NOTE — PROGRESS NOTES
Assumed care of patient at 1900. Patient alert & oriented x4. Cortrak clogged overnight and unable to declog. Cortrak removed overnight and possible replacement tomorrow pending speech eval. MD made aware and IV fluids started on patient overnight. VSS. Daughter at bedside. Call bell and bed alarm in place. No other needs at this time.

## 2018-06-19 NOTE — PROGRESS NOTES
Received report from night nurse at the bedside. Assume care of Pt at 0700. Assessment completed Pt A&O X 4 . Pt denies numbness and tingling, Pt  Complains of headache 4-5/10, medicated per mar with tylenol,  Assist of one. Pt in bed, bed in lowest position, call light in place,  treaded slipper socks on.

## 2018-06-19 NOTE — THERAPY
"Speech Language Therapy FEES completed.  Functional Status: Patient seen for a FEES on this date.  She was pleasant and agreeable.  Vocal quality characterized as hoarse and dysphonic.  Upon insertion of the scope, the arytenoids were noted to be edematous but left side was worse than the right.  Vocal folds were red with bilateral posterior space occupying tissue, suspicious for granulation tissue, and red protruding tissue was visualized coming from the lower 1/2 of the right vocal fold.  Incomplete adduction of the vocal folds was observed with hourglass pattern closure.  Presentation of PO included ice chips, nectars, purees, pudding, soft solids, and thin liquids.  The patient presented with moderate-severe pharyngeal dysphagia as seen by premature spillage to the level of the valleculae and pyriform sinuses was noted with all consistencies as well as mild diffuse pharyngeal residue with nectars, pudding, and thin liquids.  Penetration to the tip of the epiglottis was observed before the swallow with pudding but to the vocal cords with nectars and thins.  Trickle aspiration between the arytenoids was visualized with pudding and thin liquids.  All episodes of penetration/aspiration were silent.  At this time, recommend the patient remain NPO with replacement of the Cortrak.  OK to continue single ice chips x5 per hour with DIRECT supervision.  Provided instruction and training to vocal rest, easy onset and avoiding vocal abuse.  SLP following     Recommendations - Diet: Diet / Liquid Recommendation: NPO, Pre-Feeding Trials with SLP Only                          Strategies: To be assessed                          Medication Administration: Medication Administration : Via Gastric Tube  Plan of Care: Will benefit from Speech Therapy 5 times per week  Post-Acute Therapy: To be determined.     See \"Rehab Therapy-Acute\" Patient Summary Report for complete documentation.   "

## 2018-06-20 LAB
ANION GAP SERPL CALC-SCNC: 9 MMOL/L (ref 0–11.9)
BUN SERPL-MCNC: 9 MG/DL (ref 8–22)
CALCIUM SERPL-MCNC: 9 MG/DL (ref 8.5–10.5)
CHLORIDE SERPL-SCNC: 105 MMOL/L (ref 96–112)
CO2 SERPL-SCNC: 25 MMOL/L (ref 20–33)
CREAT SERPL-MCNC: 0.61 MG/DL (ref 0.5–1.4)
ERYTHROCYTE [DISTWIDTH] IN BLOOD BY AUTOMATED COUNT: 45.5 FL (ref 35.9–50)
GLUCOSE SERPL-MCNC: 90 MG/DL (ref 65–99)
HCT VFR BLD AUTO: 34.1 % (ref 37–47)
HGB BLD-MCNC: 11.7 G/DL (ref 12–16)
MAGNESIUM SERPL-MCNC: 1.8 MG/DL (ref 1.5–2.5)
MCH RBC QN AUTO: 31 PG (ref 27–33)
MCHC RBC AUTO-ENTMCNC: 34.3 G/DL (ref 33.6–35)
MCV RBC AUTO: 90.5 FL (ref 81.4–97.8)
PLATELET # BLD AUTO: 239 K/UL (ref 164–446)
PMV BLD AUTO: 8.4 FL (ref 9–12.9)
POTASSIUM SERPL-SCNC: 3.3 MMOL/L (ref 3.6–5.5)
RBC # BLD AUTO: 3.77 M/UL (ref 4.2–5.4)
SODIUM SERPL-SCNC: 139 MMOL/L (ref 135–145)
WBC # BLD AUTO: 7.6 K/UL (ref 4.8–10.8)

## 2018-06-20 PROCEDURE — 83735 ASSAY OF MAGNESIUM: CPT

## 2018-06-20 PROCEDURE — 700105 HCHG RX REV CODE 258: Performed by: PSYCHIATRY & NEUROLOGY

## 2018-06-20 PROCEDURE — 770006 HCHG ROOM/CARE - MED/SURG/GYN SEMI*

## 2018-06-20 PROCEDURE — 97535 SELF CARE MNGMENT TRAINING: CPT

## 2018-06-20 PROCEDURE — G8989 SELF CARE D/C STATUS: HCPCS | Mod: CI

## 2018-06-20 PROCEDURE — 700105 HCHG RX REV CODE 258: Performed by: INTERNAL MEDICINE

## 2018-06-20 PROCEDURE — A9270 NON-COVERED ITEM OR SERVICE: HCPCS | Performed by: HOSPITALIST

## 2018-06-20 PROCEDURE — 99232 SBSQ HOSP IP/OBS MODERATE 35: CPT | Performed by: HOSPITALIST

## 2018-06-20 PROCEDURE — 92526 ORAL FUNCTION THERAPY: CPT

## 2018-06-20 PROCEDURE — 80048 BASIC METABOLIC PNL TOTAL CA: CPT

## 2018-06-20 PROCEDURE — 700111 HCHG RX REV CODE 636 W/ 250 OVERRIDE (IP): Performed by: INTERNAL MEDICINE

## 2018-06-20 PROCEDURE — 700102 HCHG RX REV CODE 250 W/ 637 OVERRIDE(OP): Performed by: HOSPITALIST

## 2018-06-20 PROCEDURE — 700111 HCHG RX REV CODE 636 W/ 250 OVERRIDE (IP): Performed by: PSYCHIATRY & NEUROLOGY

## 2018-06-20 PROCEDURE — 36415 COLL VENOUS BLD VENIPUNCTURE: CPT

## 2018-06-20 PROCEDURE — 700111 HCHG RX REV CODE 636 W/ 250 OVERRIDE (IP): Performed by: HOSPITALIST

## 2018-06-20 PROCEDURE — 85027 COMPLETE CBC AUTOMATED: CPT

## 2018-06-20 PROCEDURE — G8988 SELF CARE GOAL STATUS: HCPCS | Mod: CJ

## 2018-06-20 RX ADMIN — SODIUM CHLORIDE 1500 MG: 9 INJECTION, SOLUTION INTRAVENOUS at 08:40

## 2018-06-20 RX ADMIN — Medication 1 LOZENGE: at 10:14

## 2018-06-20 RX ADMIN — ENOXAPARIN SODIUM 40 MG: 100 INJECTION SUBCUTANEOUS at 08:40

## 2018-06-20 RX ADMIN — SENNOSIDES AND DOCUSATE SODIUM 2 TABLET: 8.6; 5 TABLET ORAL at 08:40

## 2018-06-20 RX ADMIN — CEFTRIAXONE 2 G: 2 INJECTION, POWDER, FOR SOLUTION INTRAMUSCULAR; INTRAVENOUS at 09:26

## 2018-06-20 RX ADMIN — ACETAMINOPHEN 650 MG: 325 TABLET, FILM COATED ORAL at 03:19

## 2018-06-20 RX ADMIN — HYDROCORTISONE: 25 CREAM TOPICAL at 21:00

## 2018-06-20 RX ADMIN — SODIUM CHLORIDE 1500 MG: 9 INJECTION, SOLUTION INTRAVENOUS at 21:26

## 2018-06-20 RX ADMIN — OXYCODONE HYDROCHLORIDE 5 MG: 5 TABLET ORAL at 21:26

## 2018-06-20 RX ADMIN — HYDROCORTISONE: 25 CREAM TOPICAL at 08:46

## 2018-06-20 ASSESSMENT — ENCOUNTER SYMPTOMS
VOMITING: 0
RESPIRATORY NEGATIVE: 1
BLURRED VISION: 0
COUGH: 0
DEPRESSION: 0
CLAUDICATION: 0
SINUS PAIN: 1
CARDIOVASCULAR NEGATIVE: 1
ABDOMINAL PAIN: 0
NAUSEA: 0
NECK PAIN: 0
PALPITATIONS: 0
SPUTUM PRODUCTION: 0
TINGLING: 0
ORTHOPNEA: 0
EYES NEGATIVE: 1
SHORTNESS OF BREATH: 0
DIAPHORESIS: 0
BRUISES/BLEEDS EASILY: 0
HEARTBURN: 0
WEAKNESS: 1
DIARRHEA: 0
MYALGIAS: 0
GASTROINTESTINAL NEGATIVE: 1
TREMORS: 0
PSYCHIATRIC NEGATIVE: 1
DIZZINESS: 0
WEIGHT LOSS: 0
PHOTOPHOBIA: 0

## 2018-06-20 ASSESSMENT — LIFESTYLE VARIABLES: SUBSTANCE_ABUSE: 0

## 2018-06-20 ASSESSMENT — PAIN SCALES - GENERAL
PAINLEVEL_OUTOF10: 0
PAINLEVEL_OUTOF10: 0
PAINLEVEL_OUTOF10: 6

## 2018-06-20 ASSESSMENT — COGNITIVE AND FUNCTIONAL STATUS - GENERAL
SUGGESTED CMS G CODE MODIFIER DAILY ACTIVITY: CH
DAILY ACTIVITIY SCORE: 24

## 2018-06-20 NOTE — PROGRESS NOTES
Patient alert & oriented x4. Cortrak in place and Xray verified. TF started overnight at 25ml/hr. Patient slightly nauseated after starting but resolved with Zofran. Will increase to 50ml this am. Oxy and Tylenol given for pain. VSS. Family at bedside. No other needs.

## 2018-06-20 NOTE — THERAPY
"Speech Language Therapy dysphagia treatment completed.   Functional Status:  Patient seen for dysphagia therapy on this date.  Per MD, the patient may discharge in the coming day to [place] but with the Cortrak.  Upon entering the room, the patient's voice was improved from prior session but still remains hoarse and reduced in intensity.  Patient consumed ice chips but required consistent cues to reduce bolus size to single ice chips.  She demonstrated timely initiation of swallow trigger and complete laryngeal elevation and excursion upon palpation.  Patient with no overt s/sx of aspiration with ice chips.  Provided instruction and training to dysphagia exercises with written directives left at bedside.  The patient complete 5/5 reps of Mendelson, high pitch \"ee\", effortful swallow, tongue base retraction, chin tuck against resistance, and the Anne with \"good\" accuracy, given min cues.  Provided extensive education to aspiration precautions with tube feeding (HOB elevated above 30 degrees during feedings) and aspiration risks with PO intake.  Patient verbalized understanding and agreement.    Recommendations: Continue NPO with tube feeding.  OK to continue single ice chips x5 per hour with STRICT precautions.  Patient would benefit from home health/out patient SLP services upon discharge.  SLP following during acute care.    Plan of Care: Will benefit from Speech Therapy 5 times per week  Post-Acute Therapy: Discharge to home with outpatient or home health for additional skilled therapy services.    See \"Rehab Therapy-Acute\" Patient Summary Report for complete documentation.     "

## 2018-06-20 NOTE — PROGRESS NOTES
[facility] Hospitalist Progress Note    Date of Service: 6/20/2018    Chief Complaint  52 y.o. female admitted 6/13/2018 with altered level of consciousness    Interval Problem Update  Spoke with family and patient in detail today regarding her condition prognosis as well as current treatment plan. After discussing with him all the option due to their location of where they live the patient would best be served by going with her mother right now to [place] where she does have her primary care physician in the 1st place. The patient will continue with outpatient tube feeds at this point physical therapy and occupational therapy have evaluated her and find that the patient has improved drastically. Speech at this point is still an issue though because of vocal cord damage. At this point where making arrangements to get her home with outpatient follow-ups and management.    Consultants/Specialty  Neurology, pulmonary medicine    Disposition  Most likely home with outpatient follow-ups and management.        Review of Systems   Constitutional: Negative for diaphoresis, malaise/fatigue and weight loss.   HENT: Positive for congestion and sinus pain. Negative for hearing loss.    Eyes: Negative.  Negative for blurred vision and photophobia.   Respiratory: Negative.  Negative for cough, sputum production and shortness of breath.    Cardiovascular: Negative.  Negative for palpitations, orthopnea and claudication.   Gastrointestinal: Negative.  Negative for abdominal pain, diarrhea, heartburn, nausea and vomiting.   Genitourinary: Negative.  Negative for dysuria.   Musculoskeletal: Positive for joint pain (both shoulders). Negative for myalgias and neck pain.   Skin: Negative.  Negative for itching and rash.   Neurological: Positive for weakness. Negative for dizziness, tingling and tremors.   Endo/Heme/Allergies: Negative.  Does not bruise/bleed easily.   Psychiatric/Behavioral: Negative.  Negative for depression, substance abuse  and suicidal ideas.   All other systems reviewed and are negative.     Physical Exam  Laboratory/Imaging   Hemodynamics  Temp (24hrs), Av °C (98.6 °F), Min:36.8 °C (98.3 °F), Max:37.2 °C (98.9 °F)   Temperature: 37.2 °C (98.9 °F)  Pulse  Av.1  Min: 50  Max: 119    Blood Pressure: 125/74     Respiratory      Respiration: 17, Pulse Oximetry: 97 %     Work Of Breathing / Effort: Mild  RUL Breath Sounds: Diminished, RML Breath Sounds: Diminished, RLL Breath Sounds: Diminished, WENDY Breath Sounds: Diminished, LLL Breath Sounds: Diminished    Fluids    Intake/Output Summary (Last 24 hours) at 18 1530  Last data filed at 18 0900   Gross per 24 hour   Intake              984 ml   Output                0 ml   Net              984 ml       Nutrition  Orders Placed This Encounter   Procedures   • Diet NPO     Standing Status:   Standing     Number of Occurrences:   1     Order Specific Question:   Type:     Answer:   Now [1]     Order Specific Question:   Restrict to:     Answer:   Strict [1]     Physical Exam   Constitutional: She is oriented to person, place, and time. She appears well-developed and well-nourished. No distress.   HENT:   Head: Normocephalic and atraumatic.   Right Ear: External ear normal.   Left Ear: External ear normal.   Eyes: Conjunctivae and EOM are normal. Pupils are equal, round, and reactive to light. Right eye exhibits no discharge. Left eye exhibits no discharge.   Neck: Normal range of motion. Neck supple. No tracheal deviation present.   Cardiovascular: Normal rate and regular rhythm.  Exam reveals no friction rub.    No murmur heard.  Pulmonary/Chest: Effort normal and breath sounds normal. No stridor. No respiratory distress. She has no wheezes.   Abdominal: Soft. Bowel sounds are normal. She exhibits no distension. There is no tenderness.   Musculoskeletal: Normal range of motion. She exhibits no tenderness.   Neurological: She is alert and oriented to person, place, and  time. She has normal reflexes.   Skin: Skin is warm and dry. She is not diaphoretic.   Psychiatric: She has a normal mood and affect. Her speech is normal. Judgment normal. She is slowed. Cognition and memory are normal.   Nursing note and vitals reviewed.      Recent Labs      06/18/18   0450  06/19/18   0500  06/20/18   0557   WBC  7.4  7.8  7.6   RBC  3.50*  3.55*  3.77*   HEMOGLOBIN  11.1*  11.3*  11.7*   HEMATOCRIT  31.9*  32.1*  34.1*   MCV  91.1  90.4  90.5   MCH  31.7  31.8  31.0   MCHC  34.8  35.2*  34.3   RDW  47.8  47.0  45.5   PLATELETCT  203  217  239   MPV  8.8*  8.5*  8.4*     Recent Labs      06/18/18   0450  06/19/18   0500  06/20/18   0557   SODIUM  140  140  139   POTASSIUM  3.5*  3.6  3.3*   CHLORIDE  108  105  105   CO2  25  25  25   GLUCOSE  128*  90  90   BUN  10  9  9   CREATININE  0.46*  0.51  0.61   CALCIUM  8.2*  8.7  9.0                      Assessment/Plan     * Encephalopathy acute   Assessment & Plan    Resolved            Acute respiratory failure with hypoxemia (HCC)   Assessment & Plan    Respiratory failure was accommodation of seizures and aspiration at this point she has completed antibiotics and at this point is off any oxygen support.        Seizures (HCC)   Assessment & Plan    Patient's seizure disorder at this point well controlled on Keppra.        Atelectasis   Assessment & Plan    Atelectasis has resolved continue with incentive spirometry        Pharyngeal dysphagia   Assessment & Plan        Vocal cord damage as well as edema of the area continues to be apparent. The patient still remains on [address] [place] tube feeds and at this point the object is to get her back to [place] where the patient will continue with bolus tube feeds as an outpatient. She will then be followed by her primary care physician.        Electrolyte abnormality   Assessment & Plan    Electrolytes have been corrected monitor            Hydradenitis   Assessment & Plan    Continue at this point with  topical steroid cream          Quality-Core Measures   Reviewed items::  EKG reviewed, Labs reviewed, Medications reviewed and Radiology images reviewed  Sam catheter::  No Sam  DVT prophylaxis pharmacological::  Enoxaparin (Lovenox)  Ulcer Prophylaxis::  Not indicated  Antibiotics:  Treating active infection/contamination beyond 24 hours perioperative coverage  Assessed for rehabilitation services:  Patient was assess for and/or received rehabilitation services during this hospitalization

## 2018-06-20 NOTE — THERAPY
"Occupational Therapy Treatment completed with focus on ADLs, ADL transfers and patient education.  Functional Status:  Pt seen for OT tx. Pt is up w/ assistance from family/staff as needed using FWW. Pt fully dressed upon arrival and stated that she was able to complete TB dressing w/o assistance. Pt amb w/ FWW and mod-I. Pt able to demonstrate ability to complete ADLs and ADL transfers w/ mod-I. Pt pleasant and cooperative. Discussed possible DME/AE needed for home to assist w/ ADLs and ADL transfers. Pt reports that she will d/c home w/ assistance from family as needed.   Plan of Care: Pt has met current goals in acute care setting. Will discuss POC w/ OTR.   Discharge Recommendations:  Equipment No Equipment Needed.     See \"Rehab Therapy-Acute\" Patient Summary Report for complete documentation.   "

## 2018-06-20 NOTE — FACE TO FACE
Face to Face Supporting Documentation - Home Health    The encounter with this patient was in whole or in part the primary reason for home health admission.    Date of encounter:   Patient:                    MRN:                       YOB: 2017  Malathi York  [id number]  1965     Home health to see patient for:  Skilled Nursing care for assessment, interventions & education    Skilled need for:  New Onset Medical Diagnosis seizure disorder with dysphagia    Skilled nursing interventions to include:  Comment: home tube feed therapy    Homebound status evidenced by:  Need the aid of supportive devices such as crutches, canes, wheelchairs or walkers. Leaving home requires a considerable and taxing effort. There is a normal inability to leave the home.    Community Physician to provide follow up care: No primary care provider on file.     Optional Interventions? No      I certify the face to face encounter for this home health care referral meets the CMS requirements and the encounter/clinical assessment with the patient was, in whole, or in part, for the medical condition(s) listed above, which is the primary reason for home health care. Based on my clinical findings: the service(s) are medically necessary, support the need for home health care, and the homebound criteria are met.  I certify that this patient has had a face to face encounter by myself.  Chanel Land M.D. - NPI: [id number]

## 2018-06-20 NOTE — PROGRESS NOTES
Aox4. TORRES. Denies n/t. Denies pain. Voiding. Tearful this am about still needing cortrak. Comforted pt. Family at bedside. Able to make needs known.

## 2018-06-20 NOTE — DISCHARGE PLANNING
Transitional Care Navigator:    TCN met with patient, daughter, and mother to discuss transitional care services for discharge planning. Pt [place], [place] with family.  Pt is NPO with a [place].  Pt would prefer to discharge to pt's mothers home in [place], [place].  Pt would be driving with family to [place].  Pt's PCP, Dr. Maria Isabel Wallace M.D. located in [place], [place].  Home health level of care has been recommended for follow-up SLP and RN care.  TCN explained home health level of care in detail. Family is researching home health agencies in [place].  MD in agreement with d/c plan to [place].  RD/ST/RN will provide dysphagia information.  SW aware and will follow-up for home health choice.  TCN will follow-up as needed.

## 2018-06-20 NOTE — DISCHARGE PLANNING
Current documentation reveals a potential for acute inpatient rehabilitation, please consider a PMR referral to assist with discharge planning.

## 2018-06-20 NOTE — CARE PLAN
Problem: Communication  Goal: The ability to communicate needs accurately and effectively will improve    Intervention: Educate patient and significant other/support system about the plan of care, procedures, treatments, medications and allow for questions  Educated on SLP. Plan to eval swallow today.      Problem: Pain Management  Goal: Pain level will decrease to patient's comfort goal    Intervention: Follow pain managment plan developed in collaboration with patient and Interdisciplinary Team  Denies pain at this time.

## 2018-06-20 NOTE — DIETARY
Nutrition Support Weekly Update: Day 7 of admit.  Malathi York is a 52 y.o. female with admitting DX of Seizure. Tube feeding initiated on 6/15. Current TF via Gastric Cortrak is Replete with Fiber @ 75 ml/hr, providing 1800 kcals, 115 grams protein, 1494 mL free water per day.     Assessment:  Wt : 91.5 kg via bed scale - wt increased since admit suspect related to fluids. Per I/Os pt +9.6 L  Estimated Nutritional Needs: based on: Ht: 175 cm, Wt : 83.9 kg via bed scale, IBW: 65.8 kg, BMI: 27.4     Calculation/Equation: REE per MSJ x 1.2 = 1822 kcal/day  Total Calories / day: 1800 - 2100 (Calories / k - 25)  Total Grams Protein / day: 84 - 101 (Grams Protein / k - 1.2)  Total Fluids ml / day: 2521.5 ml    Evaluation:   1. MD called request TF be adjusted to bolus feeding. Pt will continue to have cortrak and lives in [place].    2. Labs: K 3.3  3. Last BM:   4. Pt will benefit from standard TF formula.      Recommendations/Plan:  Adjust TF to bolus. Give ½ container of Fibersource HN via Cortrak at 1st feeding. Advance each feeding ½ container until goal is reached. Goal is 6 containers/day (2 containers at meal times or 1 container at meal times and 1 container for snacks TID or as tolerated by patient). This provides 1800 kcal (21 kcal/kg), 81 grams protein (1 gm/kg), and 1215 ml free water per day.  Fluids per MD. For adequate hydration recommend providing 320 ml free water flushes 4 times a day in between bolus feeds. Total fluids from TF and free water flushes 2495 ml per day  Diet upgrades per SLP.     RD following

## 2018-06-21 VITALS
HEART RATE: 77 BPM | TEMPERATURE: 98.1 F | SYSTOLIC BLOOD PRESSURE: 121 MMHG | RESPIRATION RATE: 19 BRPM | WEIGHT: 201.72 LBS | HEIGHT: 69 IN | DIASTOLIC BLOOD PRESSURE: 78 MMHG | OXYGEN SATURATION: 96 % | BODY MASS INDEX: 29.88 KG/M2

## 2018-06-21 LAB
ANION GAP SERPL CALC-SCNC: 10 MMOL/L (ref 0–11.9)
BUN SERPL-MCNC: 11 MG/DL (ref 8–22)
CALCIUM SERPL-MCNC: 9.2 MG/DL (ref 8.5–10.5)
CHLORIDE SERPL-SCNC: 106 MMOL/L (ref 96–112)
CO2 SERPL-SCNC: 24 MMOL/L (ref 20–33)
CREAT SERPL-MCNC: 0.73 MG/DL (ref 0.5–1.4)
ERYTHROCYTE [DISTWIDTH] IN BLOOD BY AUTOMATED COUNT: 45.1 FL (ref 35.9–50)
GLUCOSE SERPL-MCNC: 86 MG/DL (ref 65–99)
HCT VFR BLD AUTO: 34.4 % (ref 37–47)
HGB BLD-MCNC: 11.9 G/DL (ref 12–16)
MAGNESIUM SERPL-MCNC: 1.8 MG/DL (ref 1.5–2.5)
MCH RBC QN AUTO: 30.9 PG (ref 27–33)
MCHC RBC AUTO-ENTMCNC: 34.6 G/DL (ref 33.6–35)
MCV RBC AUTO: 89.4 FL (ref 81.4–97.8)
PLATELET # BLD AUTO: 227 K/UL (ref 164–446)
PMV BLD AUTO: 8.2 FL (ref 9–12.9)
POTASSIUM SERPL-SCNC: 3.7 MMOL/L (ref 3.6–5.5)
RBC # BLD AUTO: 3.85 M/UL (ref 4.2–5.4)
SODIUM SERPL-SCNC: 140 MMOL/L (ref 135–145)
WBC # BLD AUTO: 7.7 K/UL (ref 4.8–10.8)

## 2018-06-21 PROCEDURE — 99239 HOSP IP/OBS DSCHRG MGMT >30: CPT | Performed by: HOSPITALIST

## 2018-06-21 PROCEDURE — 36415 COLL VENOUS BLD VENIPUNCTURE: CPT

## 2018-06-21 PROCEDURE — 80048 BASIC METABOLIC PNL TOTAL CA: CPT

## 2018-06-21 PROCEDURE — 92526 ORAL FUNCTION THERAPY: CPT

## 2018-06-21 PROCEDURE — 85027 COMPLETE CBC AUTOMATED: CPT

## 2018-06-21 PROCEDURE — 83735 ASSAY OF MAGNESIUM: CPT

## 2018-06-21 PROCEDURE — 700111 HCHG RX REV CODE 636 W/ 250 OVERRIDE (IP): Performed by: INTERNAL MEDICINE

## 2018-06-21 PROCEDURE — 700111 HCHG RX REV CODE 636 W/ 250 OVERRIDE (IP): Performed by: HOSPITALIST

## 2018-06-21 PROCEDURE — A9270 NON-COVERED ITEM OR SERVICE: HCPCS | Performed by: HOSPITALIST

## 2018-06-21 PROCEDURE — 700105 HCHG RX REV CODE 258: Performed by: INTERNAL MEDICINE

## 2018-06-21 PROCEDURE — 700102 HCHG RX REV CODE 250 W/ 637 OVERRIDE(OP): Performed by: HOSPITALIST

## 2018-06-21 RX ORDER — LEVETIRACETAM 100 MG/ML
1500 SOLUTION ORAL 2 TIMES DAILY
Qty: 900 ML | Refills: 0 | Status: SHIPPED | OUTPATIENT
Start: 2018-06-21 | End: 2018-07-21

## 2018-06-21 RX ORDER — OXYCODONE HYDROCHLORIDE 5 MG/1
5 TABLET ORAL EVERY 6 HOURS PRN
Qty: 30 TAB | Refills: 0 | Status: SHIPPED | OUTPATIENT
Start: 2018-06-21 | End: 2018-06-21

## 2018-06-21 RX ORDER — LEVOFLOXACIN 25 MG/ML
500 SOLUTION ORAL DAILY
Qty: 140 ML | Refills: 0 | Status: SHIPPED | OUTPATIENT
Start: 2018-06-21 | End: 2018-06-28

## 2018-06-21 RX ORDER — OXYCODONE HYDROCHLORIDE 5 MG/1
5 TABLET ORAL EVERY 6 HOURS PRN
Qty: 30 TAB | Refills: 0 | Status: SHIPPED | OUTPATIENT
Start: 2018-06-21 | End: 2018-06-28

## 2018-06-21 RX ORDER — LEVOFLOXACIN 25 MG/ML
500 SOLUTION ORAL DAILY
Qty: 60 ML | Refills: 0 | Status: SHIPPED | OUTPATIENT
Start: 2018-06-21 | End: 2018-06-21

## 2018-06-21 RX ADMIN — SENNOSIDES AND DOCUSATE SODIUM 2 TABLET: 8.6; 5 TABLET ORAL at 08:12

## 2018-06-21 RX ADMIN — CEFTRIAXONE 2 G: 2 INJECTION, POWDER, FOR SOLUTION INTRAMUSCULAR; INTRAVENOUS at 08:07

## 2018-06-21 RX ADMIN — ENOXAPARIN SODIUM 40 MG: 100 INJECTION SUBCUTANEOUS at 08:12

## 2018-06-21 RX ADMIN — HYDROCORTISONE: 25 CREAM TOPICAL at 08:12

## 2018-06-21 ASSESSMENT — PAIN SCALES - GENERAL
PAINLEVEL_OUTOF10: 0
PAINLEVEL_OUTOF10: 0

## 2018-06-21 NOTE — DISCHARGE PLANNING
Anticipated Discharge Disposition: Home w/ HH    Action: LSW met with pt bedside. Pt will be driven to [place], [place] today. This is an 8 hour drive. Pt's  PCP, Maria Isabel Wallace, through [facility] is seeing her first thing tomorrow morning. HH will be set up through the practice. Her cousin is an RN and her Dentist, Dr. Feliciano is also helping out. Pt feels as if she has a solid plan for dc.    Barriers to Discharge: None    Plan: Pt to dc home to [place] pending HH there.

## 2018-06-21 NOTE — DISCHARGE INSTRUCTIONS
Discharge Instructions    Discharged to home by car with relative. Discharged via wheelchair, hospital escort: Yes.  Special equipment needed: [place]    Be sure to schedule a follow-up appointment with your primary care doctor or any specialists as instructed.     Discharge Plan:   Influenza Vaccine Indication: Patient Refuses (per family)    I understand that a diet low in cholesterol, fat, and sodium is recommended for good health. Unless I have been given specific instructions below for another diet, I accept this instruction as my diet prescription.   Other diet: Tube feeds    Special Instructions: None    · Is patient discharged on Warfarin / Coumadin?   No     TUBE FEEDS WITH BOLUS FEEDS  ACTIVITIES AS TOLERATED  FOLLOW UP WITH PCP IN 7 DAYS  FOLLOW UP WITH DR TESSA HEREDIA TOMORROW MORNING  NO SMOKING, NO ALCOHOL, NO CAFFEINE  WEAR SEAT BELT IN MOTORIZED VEHICLE  TAKE MEDICATIONS AS PRESCRIBED THROUGH NG TUBE   KEEP APPOINTMENTS  IF SYMPTOMS WORSEN, CALL PCP, 1 OR URGENT CARE        Depression / Suicide Risk    As you are discharged from this [facility] facility, it is important to learn how to keep safe from harming yourself.    Recognize the warning signs:  · Abrupt changes in personality, positive or negative- including increase in energy   · Giving away possessions  · Change in eating patterns- significant weight changes-  positive or negative  · Change in sleeping patterns- unable to sleep or sleeping all the time   · Unwillingness or inability to communicate  · Depression  · Unusual sadness, discouragement and loneliness  · Talk of wanting to die  · Neglect of personal appearance   · Rebelliousness- reckless behavior  · Withdrawal from people/activities they love  · Confusion- inability to concentrate     If you or a loved one observes any of these behaviors or has concerns about self-harm, here's what you can do:  · Talk about it- your feelings and reasons for harming yourself  · Remove any  means that you might use to hurt yourself (examples: pills, rope, extension cords, firearm)  · Get professional help from the community (Mental Health, Substance Abuse, psychological counseling)  · Do not be alone:Call your Safe Contact- someone whom you trust who will be there for you.  · Call your local CRISIS HOTLINE 213-3897 or 095-098-4372  · Call your local Children's Mobile Crisis Response Team [place] (992) 162-1518 or www.Giferent  · Call the toll free National Suicide Prevention Hotlines   · National Suicide Prevention Lifeline 938-039-DPLY (0374)  · National Hope Line Network 800-SUICIDE (768-8485)

## 2018-06-21 NOTE — PROGRESS NOTES
Pt and family educated on cortrak care and maintenance. Educated on bolus feedings, sitting upright after, medication administration, flushing. Verbalized understanding. Family to practice at next feeding.

## 2018-06-21 NOTE — THERAPY
"Speech Language Therapy dysphagia treatment completed.   Functional Status:  Patient seen for dysphagia therapy prior to discharging today.  She stated she only completed chin tuck against resistance last night and did not do any other dysphagia exercises as she was up walking with family.  Reinforced education to exercises and dysphagia.  Patient verbalized understanding.  She completed 10/10 reps of effortful swallow, Mendelsohn, Anne, and TBR with \"good\" accuracy, given min cues.  She attempted the Falsetto but, given continued hoarse vocal quality, was not able to achieve high pitch.  Sustained head raise resulted in c/o neck pain, therefore, theses exercises were discontinued.  The patient consumed 1/2 teaspoons of nectars x10 which resulted in timely initiation of swallow trigger and adequate laryngeal elevation upon palpation but requierd 2-3 swallows per trial to clear patient's sensation of pharyngeal residue.     Recommendations: Continue NPO with tube feeding.  The pateint may benefit from a repeat diagnostic evaluation prior to initiation of a PO diet.    Plan of Care: Will benefit from Speech Therapy 5 times per week  Post-Acute Therapy: Discharge to home with outpatient or home health for additional skilled therapy services.    See \"Rehab Therapy-Acute\" Patient Summary Report for complete documentation.     "

## 2018-06-21 NOTE — PROGRESS NOTES
In prescribing controlled substances to this patient, I certify that I have obtained and reviewed the medical history of Malathi York. I have also made a good ramses effort to obtain applicable records from other providers who have treated the patient and records did not demonstrate any increased risk of substance abuse that would prevent me from prescribing controlled substances.     I have conducted a physical exam and documented it. I have reviewed Ms. York’s prescription history as maintained by the [place] Prescription Monitoring Program.     I have assessed the patient’s risk for abuse, dependency, and addiction using the validated Opioid Risk Tool available at https://www.mdcalc.com/dbqmwl-agqy-zgup-ort-narcotic-abuse.     Given the above, I believe the benefits of controlled substance therapy outweigh the risks. The reasons for prescribing controlled substances include non-narcotic, oral analgesic alternatives have been inadequate for pain control. Accordingly, I have discussed the risk and benefits, treatment plan, and alternative therapies with the patient.

## 2018-06-21 NOTE — PROGRESS NOTES
Assumed care of patient at 1900. Patient is A/O x4. No seizure activity noted this shift. VSS on RA. Tolerating bolus feeds. Family at bedside. Call light within reach. Hourly rounding in place.

## 2018-06-21 NOTE — PROGRESS NOTES
Voiced readiness for discharge home. Pt will travel to [place] w/ . Dr Land, MADISON, and this RN all in communication w/ discharge planning. Pt and  comfortable with tube feedings and medication administration via cortrak. Pt and  will be picking prescriptions to [facility].

## 2018-06-21 NOTE — CARE PLAN
Problem: Safety  Goal: Will remain free from injury  Outcome: PROGRESSING AS EXPECTED      Problem: Knowledge Deficit  Goal: Knowledge of disease process/condition, treatment plan, diagnostic tests, and medications will improve  Outcome: PROGRESSING AS EXPECTED  POC discussed with patient. All questions answered.

## 2018-06-26 NOTE — DOCUMENTATION QUERY
DOCUMENTATION QUERY    PROVIDERS: Please select “Cosign w/ note”to reply to query.    To better represent the severity of illness of your patient, please review the following information and exercise your independent professional judgment in responding to this query.     This patient is documented as having vocal cord damage and edema. She was intubated and on court reza tube feeds. Can the cause of the vocal cord damage be specified if known?      The medical record reflects the following:   Clinical Findings vocal cord damage as well as edema in the area, this we can see that the vocal cords at this point are mechanically traumatized   Treatment     Risk Factors     Location within medical record  PN 6/20, discharge summary     Thank you,   Sonja Tirado

## 2018-07-11 LAB
FUNGUS SPEC CULT: NORMAL
SIGNIFICANT IND 70042: NORMAL
SITE SITE: NORMAL
SOURCE SOURCE: NORMAL

## 2018-08-10 LAB
MYCOBACTERIUM SPEC CULT: NORMAL
RHODAMINE-AURAMINE STN SPEC: NORMAL
SIGNIFICANT IND 70042: NORMAL
SITE SITE: NORMAL
SOURCE SOURCE: NORMAL

## 2018-12-10 ENCOUNTER — OFFICE VISIT (OUTPATIENT)
Dept: NEUROLOGY | Facility: MEDICAL CENTER | Age: 53
End: 2018-12-10
Payer: COMMERCIAL

## 2018-12-10 VITALS
WEIGHT: 198 LBS | HEART RATE: 69 BPM | TEMPERATURE: 97.4 F | SYSTOLIC BLOOD PRESSURE: 120 MMHG | OXYGEN SATURATION: 96 % | BODY MASS INDEX: 26.82 KG/M2 | HEIGHT: 72 IN | RESPIRATION RATE: 18 BRPM | DIASTOLIC BLOOD PRESSURE: 76 MMHG

## 2018-12-10 DIAGNOSIS — R56.9 SEIZURES (HCC): ICD-10-CM

## 2018-12-10 PROCEDURE — 99204 OFFICE O/P NEW MOD 45 MIN: CPT | Performed by: NURSE PRACTITIONER

## 2018-12-10 RX ORDER — LEVETIRACETAM 750 MG/1
1500 TABLET ORAL 2 TIMES DAILY
COMMUNITY
Start: 2018-11-26 | End: 2018-12-10 | Stop reason: SDUPTHER

## 2018-12-10 RX ORDER — LEVETIRACETAM 750 MG/1
1500 TABLET ORAL 2 TIMES DAILY
Qty: 360 TAB | Refills: 3 | Status: SHIPPED | OUTPATIENT
Start: 2018-12-10 | End: 2019-07-17 | Stop reason: SDUPTHER

## 2018-12-10 RX ORDER — LORAZEPAM 1 MG/1
TABLET ORAL
Qty: 20 TAB | Refills: 0 | Status: SHIPPED
Start: 2018-12-10 | End: 2020-08-31 | Stop reason: SDUPTHER

## 2018-12-10 ASSESSMENT — PATIENT HEALTH QUESTIONNAIRE - PHQ9: CLINICAL INTERPRETATION OF PHQ2 SCORE: 0

## 2018-12-10 NOTE — PROGRESS NOTES
Subjective:      Malathi York is a 53 y.o. female who presents with New Patient (Seizures)            HPI Here with male  today.  Follow-up today, December 10, 2018 r/t hospitalization on 6/13/2018.    6/13/2018: Found to not be answering questions well. She was brought to [facility]-- slurring her speech and confused when she awoke that morning.  She was quite fatigued the day before.  She did have a seizure once she presented to [facility].  Unconscious for a few days thereafter.  She had been sick and under extreme stress with hosting a huge wedding, etc.    Started on Keppra after the hospitalization.  She has felt gasy at times and is wondering if that is attributed to the Keppra.    No other concern for seizures in her history.  Denies unusual events, memory lapse.  Denies unusual feelings or aura.  Sister-- concern for a seizure when really hot and then cooled off.  Father-- had a seizure with extreme sleep deprivation.    Feels like her memory is poor from prior to the hospitalization.  Struggles with her short term memory which is normal to her.  She does not find this concerning.     24-hour EEG monitoring INTERPRETATION:   This is a normal extended video electroencephalogram recording in a sedated patient with intermittent arousals.  No events or seizures were captured during the study. Clinical correlation is recommended.     Note: A normal electroencephalogram does not rule out epilepsy.     Impression       1.  No evidence of acute territorial infarct, intracranial hemorrhage or mass lesion.  2.  Mild microangiopathic ischemic change versus demyelination or gliosis.  3.  Pansinusitis.       Medical history: a few years ago she was diagnosed with diabetes which has resolved.  Skin condition: managed by dermatologist.  Takes iron supplement.  Managed with prednisone for 5 years or so.     Surgical history: skin removal procedures X2-3    [place].      Current  Outpatient Prescriptions   Medication Sig Dispense Refill   • levetiracetam (KEPPRA) 750 MG tablet Take 1,500 mg by mouth 2 Times a Day.     • aspirin 81 MG tablet Take 81 mg by mouth every day.     • IRON PO Take 50 mg by mouth every day.     • multivitamin (THERAGRAN) Tab Take 1 Tab by mouth every day.     • Multiple Vitamins-Minerals (ZINC PO) Take 1 Tab by mouth every day.       No current facility-administered medications for this visit.        Review of Systems   Constitutional: Negative.    HENT: Negative for hearing loss, nosebleeds and sore throat.         No recent head injury.   Eyes: Negative for double vision.        No new loss of vision.   Respiratory: Negative for cough.         No recent lung infections.   Cardiovascular: Negative for chest pain.   Gastrointestinal: Negative for abdominal pain, diarrhea, nausea and vomiting.   Genitourinary: Negative.    Musculoskeletal: Negative.    Skin: Negative.    Neurological: Negative for seizures and headaches.   Endo/Heme/Allergies:        No history of endocrine dysfunction.  No new problems.   Psychiatric/Behavioral: Negative for depression. The patient is not nervous/anxious.         No recent mood changes.          Objective:     There were no vitals taken for this visit.     Physical Exam   Constitutional: She is oriented to person, place, and time. She appears well-developed and well-nourished. No distress.   HENT:   Head: Normocephalic and atraumatic.   Nose: Nose normal.   Eyes: Pupils are equal, round, and reactive to light.   Neck: Normal range of motion.   Cardiovascular: Normal rate and regular rhythm.  Exam reveals no gallop and no friction rub.    No murmur heard.  Pulmonary/Chest: Effort normal and breath sounds normal. No respiratory distress.   Lymphadenopathy:     She has no cervical adenopathy.   Neurological: She is alert and oriented to person, place, and time. She exhibits normal muscle tone. Gait normal.   Pleasant, right handed.  CN  II: Fundi normal, visual fields full to confrontation.  CN III, IV, VI: Pupils equal, round, and reactive to light.  Extraocular movements full and intact in horizontal and vertical gaze.  CN V: Normal in motor and sensory modalities.  CN VII: No evidence of facial asymmetry.  CN VIII: Hearing grossly intact.  CN IX, X: Palate elevates symmetrically and in the midline.  CN XI: Normal sternocleidomastoid strength.  CN XII: Tongue is in the midline.    Motor: Normal muscle bulk and tone, with full and symmetric strength.  Sensory: Intact to light touch, pinprick, vibration, proprioception, and graphesthesia.  DTR's: 1+ throughout with flexor plantar responses.  Cerebellar/Coordination: Normal finger to finger, finger-tapping, rapid alternating movements, and foot tapping.  Gait: Normal casual gait.  Walks well on heels and toes, as well as in tandem gait.   Skin: Skin is warm and dry.   Psychiatric: She has a normal mood and affect.           Assessment/Plan:     One-time seizure:  6/13/2018: Prolonged prodrome of poor memory when awakened.  Ultimately had a GTC seizure when at the hospital.  Intubated and ultimately discharged on Keppra.  Concern for sepsis but no diagnostic results to support such-- was very stressed and worn down.    No other concern for seizures, no history of febrile seizures.    Neurological examination is normal and non-focal.    Continue Keppra 750mg tablets, taking 1500mg BID.  Recommend 2 years on AED and seizure-free.  Will then repeat at least a 24-hour AEEG to consider weaning off AED.    Discussed triggers for a seizure include extreme stress and fatigue.      Discussed signs/symptoms of seizure onset in the future.  New Rx for ativan 1mg tablet prescribed.    Return for follow-up in summer 2019 due to 2+ hour drive to come for an appointment.      EDUCATION AND COUNSELING:  -Education was provided to the patient and/or family regarding diagnosis and prognosis. The chronic and  unpredictable nature of the condition was discussed. There is increased risk for additional events, which may carry potential for significant injuries and death.    -We reviewed the current antiepileptic regimen. Potential side effects of antiepileptics were discussed at length, including but no limited to: hypersensitivity reactions (rash and others, some of which can be fatal), visual field changes (some of which may be irreversible), glaucoma, diplopia, kidney stones, osteopenia/osteoporosis/bone fractures, hyperthermia/anhydrosis, tremors, ataxia, dizziness, fatigue, increased risk for falls, cardiac arrhythmias/syncope, gastrointestinal (hepatitis, pancreatitis, gastritis, ulcers), gingival hypertrophy, drowsiness, sedation, anxiety/nervousness, increased risk for suicide, increased risk for depression, and psychosis. We reviewed drug-drug interactions and their potential effect on seizure control and medication side effects.    -Patient/family educated on SUDEP (Sudden Death in Epilepsy). Counseling was provided on the importance of strict medication and follow up compliance. The patient understands the risks associated with non-adherence with the medical plan as outlined, including but not limited to an increased risk for breakthrough seizures, which may contribute to injuries, disability, status epilepticus, and even death.    -Counseling was also provided on potential effects of alcohol and other drugs, which may lower seizure threshold and/or affect the metabolism of antiepileptic drugs. I recommend avoidance of alcohol and illegal drugs.  -Recommend proper hydration, regular exercise, proper sleep hygiene (7-8 hrs of overnight sleep, avoid sleep deprivation).    -I have made the patient aware of mandatory reporting required by the law in the State [facility] regarding episodes of seizures, loss of consciousness, and/or alteration of awareness. The patient and family are responsible for reporting events to  the DMV, instructions were provided. The patient verbalized understanding and states she has been driving. Other seizure precautions were discussed at length, including no diving, no skydiving, no unsupervised swimming, no Jacuzzi or bathing in bathtubs.    Pt agrees with plan.

## 2018-12-12 ASSESSMENT — ENCOUNTER SYMPTOMS
DIARRHEA: 0
COUGH: 0
HEADACHES: 0
SORE THROAT: 0
DEPRESSION: 0
NERVOUS/ANXIOUS: 0
DOUBLE VISION: 0
SEIZURES: 0
ABDOMINAL PAIN: 0
MUSCULOSKELETAL NEGATIVE: 1
VOMITING: 0
NAUSEA: 0
CONSTITUTIONAL NEGATIVE: 1

## 2019-07-17 ENCOUNTER — OFFICE VISIT (OUTPATIENT)
Dept: NEUROLOGY | Facility: MEDICAL CENTER | Age: 54
End: 2019-07-17
Payer: OTHER MISCELLANEOUS

## 2019-07-17 VITALS
HEART RATE: 75 BPM | SYSTOLIC BLOOD PRESSURE: 112 MMHG | DIASTOLIC BLOOD PRESSURE: 64 MMHG | HEIGHT: 72 IN | RESPIRATION RATE: 16 BRPM | TEMPERATURE: 97.2 F | OXYGEN SATURATION: 98 % | BODY MASS INDEX: 26.82 KG/M2 | WEIGHT: 198 LBS

## 2019-07-17 DIAGNOSIS — G40.909 SEIZURE DISORDER (HCC): ICD-10-CM

## 2019-07-17 PROCEDURE — 99213 OFFICE O/P EST LOW 20 MIN: CPT | Performed by: NURSE PRACTITIONER

## 2019-07-17 RX ORDER — LEVETIRACETAM 750 MG/1
1500 TABLET ORAL 2 TIMES DAILY
Qty: 360 TAB | Refills: 3 | Status: SHIPPED | OUTPATIENT
Start: 2019-07-17 | End: 2019-12-18 | Stop reason: SDUPTHER

## 2019-07-17 ASSESSMENT — ENCOUNTER SYMPTOMS
NAUSEA: 0
DEPRESSION: 0
ABDOMINAL PAIN: 0
HEADACHES: 0
CONSTITUTIONAL NEGATIVE: 1
DIARRHEA: 0
SEIZURES: 0
MUSCULOSKELETAL NEGATIVE: 1
DOUBLE VISION: 0
NERVOUS/ANXIOUS: 0
VOMITING: 0
SORE THROAT: 0
COUGH: 0

## 2019-07-17 ASSESSMENT — PATIENT HEALTH QUESTIONNAIRE - PHQ9: CLINICAL INTERPRETATION OF PHQ2 SCORE: 0

## 2019-07-17 NOTE — PROGRESS NOTES
Subjective:      Malathi York is a 53 y.o. female who presents with Follow-Up (Seizures )        Here with daughter today.    HPI  She is putting in long hours and finds that she doesn't recoup her energy as fast.      Has been taking her blood sugar at peak times of the day.  Fasting blood sugar us usually about 100.    [place] during the spring and summer seasons.  She has a step count goal of at least 15,000 per day.    Paying more attention to her body and her level of energy.  The family is helping her and encouraging her to rest when needed.    Brain MRI 2018:  Impression       1.  No evidence of acute territorial infarct, intracranial hemorrhage or mass lesion.  2.  Mild microangiopathic ischemic change versus demyelination or gliosis.  3.  Pansinusitis.       Current Outpatient Prescriptions   Medication Sig Dispense Refill   • levetiracetam (KEPPRA) 750 MG tablet Take 2 Tabs by mouth 2 Times a Day. 360 Tab 3   • multivitamin (THERAGRAN) Tab Take 1 Tab by mouth every day.     • Multiple Vitamins-Minerals (ZINC PO) Take 1 Tab by mouth every day.       No current facility-administered medications for this visit.        Review of Systems   Constitutional: Negative.    HENT: Negative for hearing loss, nosebleeds and sore throat.         No recent head injury.   Eyes: Negative for double vision.        No new loss of vision.   Respiratory: Negative for cough.         No recent lung infections.   Cardiovascular: Negative for chest pain.   Gastrointestinal: Negative for abdominal pain, diarrhea, nausea and vomiting.   Genitourinary: Negative.    Musculoskeletal: Negative.    Skin: Negative.    Neurological: Negative for seizures and headaches.   Endo/Heme/Allergies:        No history of endocrine dysfunction.  No new problems.   Psychiatric/Behavioral: Negative for depression. The patient is not nervous/anxious.         No recent mood changes.          Objective:     /64 (BP  "Location: Left arm, Patient Position: Sitting, BP Cuff Size: Adult)   Pulse 75   Temp 36.2 °C (97.2 °F) (Temporal)   Resp 16   Ht 1.829 m (6' 0.01\")   Wt 89.8 kg (198 lb)   SpO2 98%   BMI 26.85 kg/m²      Physical Exam   Constitutional: She is oriented to person, place, and time. She appears well-developed and well-nourished. No distress.   HENT:   Head: Normocephalic and atraumatic.   Eyes: EOM are normal.   Neck: Normal range of motion.   Cardiovascular: Normal rate and regular rhythm.    Pulmonary/Chest: Effort normal.   Neurological: She is alert and oriented to person, place, and time. She exhibits normal muscle tone. Gait normal.   No observable changes in neurologic status.  See initial new patient examination for details.    Skin: Skin is warm.   Psychiatric: She has a normal mood and affect.             Assessmen/Plan:     One-time seizure:  6/13/2018: Prolonged prodrome of poor memory when awakened.  Ultimately had a GTC seizure when at the hospital.  Intubated and ultimately discharged on Keppra.  Concern for sepsis but no diagnostic results to support such-- was very stressed and worn down.     No other concern for seizures, no history of febrile seizures.      Continue Keppra 750mg tablets, taking 1500mg BID. Recommend 2 years on AED and seizure-free.      Continue daily MVI supplement.    Discussed triggers for a seizure include extreme stress and fatigue.       Discussed signs/symptoms of seizure onset in the future.  New Rx for ativan 1mg tablet prescribed.     Return for follow-up in December 2019.  Will anticipate weaning Keppra at that time by 25% reduction every month.    EDUCATION AND COUNSELING:  -Education was provided to the patient and/or family regarding diagnosis and prognosis. The chronic and unpredictable nature of the condition was discussed. There is increased risk for additional events, which may carry potential for significant injuries and death.    -We reviewed the current " antiepileptic regimen. Potential side effects of antiepileptics were discussed at length, including but no limited to: hypersensitivity reactions (rash and others, some of which can be fatal), visual field changes (some of which may be irreversible), glaucoma, diplopia, kidney stones, osteopenia/osteoporosis/bone fractures, hyperthermia/anhydrosis, tremors, ataxia, dizziness, fatigue, increased risk for falls, cardiac arrhythmias/syncope, gastrointestinal (hepatitis, pancreatitis, gastritis, ulcers), gingival hypertrophy, drowsiness, sedation, anxiety/nervousness, increased risk for suicide, increased risk for depression, and psychosis. We reviewed drug-drug interactions and their potential effect on seizure control and medication side effects.    -Patient/family educated on SUDEP (Sudden Death in Epilepsy). Counseling was provided on the importance of strict medication and follow up compliance. The patient understands the risks associated with non-adherence with the medical plan as outlined, including but not limited to an increased risk for breakthrough seizures, which may contribute to injuries, disability, status epilepticus, and even death.    -Counseling was also provided on potential effects of alcohol and other drugs, which may lower seizure threshold and/or affect the metabolism of antiepileptic drugs. I recommend avoidance of alcohol and illegal drugs.  -Recommend proper hydration, regular exercise, proper sleep hygiene (7-8 hrs of overnight sleep, avoid sleep deprivation).    -I have made the patient aware of mandatory reporting required by the law in the State [place] regarding episodes of seizures, loss of consciousness, and/or alteration of awareness. The patient and family are responsible for reporting events to the DMV, instructions were provided. The patient verbalized understanding and states she has been driving. Other seizure precautions were discussed at length, including no diving, no  skydiving, no unsupervised swimming, no Jacuzzi or bathing in bathtubs.    Pt agrees with plan.

## 2019-12-18 ENCOUNTER — OFFICE VISIT (OUTPATIENT)
Dept: NEUROLOGY | Facility: MEDICAL CENTER | Age: 54
End: 2019-12-18
Payer: OTHER MISCELLANEOUS

## 2019-12-18 VITALS
HEART RATE: 85 BPM | DIASTOLIC BLOOD PRESSURE: 70 MMHG | RESPIRATION RATE: 16 BRPM | HEIGHT: 72 IN | BODY MASS INDEX: 26.28 KG/M2 | WEIGHT: 194 LBS | SYSTOLIC BLOOD PRESSURE: 112 MMHG | TEMPERATURE: 98.6 F | OXYGEN SATURATION: 96 %

## 2019-12-18 DIAGNOSIS — R56.9 SEIZURES (HCC): ICD-10-CM

## 2019-12-18 PROCEDURE — 99213 OFFICE O/P EST LOW 20 MIN: CPT | Performed by: NURSE PRACTITIONER

## 2019-12-18 RX ORDER — LEVETIRACETAM 750 MG/1
1500 TABLET ORAL 2 TIMES DAILY
Qty: 360 TAB | Refills: 1 | Status: SHIPPED | OUTPATIENT
Start: 2019-12-18 | End: 2020-08-13 | Stop reason: SDUPTHER

## 2019-12-18 ASSESSMENT — ENCOUNTER SYMPTOMS
MUSCULOSKELETAL NEGATIVE: 1
SORE THROAT: 0
DEPRESSION: 0
ABDOMINAL PAIN: 0
VOMITING: 0
HEADACHES: 0
DOUBLE VISION: 0
NAUSEA: 0
DIARRHEA: 0
COUGH: 0
SEIZURES: 0
NERVOUS/ANXIOUS: 0
CONSTITUTIONAL NEGATIVE: 1

## 2019-12-18 NOTE — PROGRESS NOTES
Subjective:      Malathi York is a 54 y.o. female who presents with Follow-Up (Seizure disorder )            HPI Presents with her daughter today.    [place] during the spring and summer seasons.  She has a step count goal of at least 15,000 per day.       Paying more attention to her body and her level of energy.  The family is helping her and encouraging her to rest when needed.    No concern for unusual events or lapse in memory.    Has been struggling with a yeast infection.  Started on an antibiotic for a skin flare-up.    Did have a cold recently with a headache for a few days.    Brain MRI 2018:  Impression        1.  No evidence of acute territorial infarct, intracranial hemorrhage or mass lesion.  2.  Mild microangiopathic ischemic change versus demyelination or gliosis.  3.  Pansinusitis.           Current Outpatient Medications   Medication Sig Dispense Refill   • levetiracetam (KEPPRA) 750 MG tablet Take 2 Tabs by mouth 2 Times a Day. 360 Tab 3   • multivitamin (THERAGRAN) Tab Take 1 Tab by mouth every day.     • Multiple Vitamins-Minerals (ZINC PO) Take 1 Tab by mouth every day.       No current facility-administered medications for this visit.        Review of Systems   Constitutional: Negative.    HENT: Negative for hearing loss, nosebleeds and sore throat.         No recent head injury.   Eyes: Negative for double vision.        No new loss of vision.   Respiratory: Negative for cough.         No recent lung infections.   Cardiovascular: Negative for chest pain.   Gastrointestinal: Negative for abdominal pain, diarrhea, nausea and vomiting.   Genitourinary: Negative.    Musculoskeletal: Negative.    Skin: Negative.    Neurological: Negative for seizures and headaches.   Endo/Heme/Allergies:        No history of endocrine dysfunction.  No new problems.   Psychiatric/Behavioral: Negative for depression. The patient is not nervous/anxious.         No recent mood changes.  "         Objective:     /70 (BP Location: Right arm, Patient Position: Sitting, BP Cuff Size: Adult)   Pulse 85   Temp 37 °C (98.6 °F) (Temporal)   Resp 16   Ht 1.829 m (6' 0.01\")   Wt 88 kg (194 lb)   SpO2 96%   BMI 26.31 kg/m²      Physical Exam  Constitutional:       General: She is not in acute distress.     Appearance: She is well-developed.   HENT:      Head: Normocephalic and atraumatic.      Nose: Nose normal.   Eyes:      Pupils: Pupils are equal, round, and reactive to light.   Neck:      Musculoskeletal: Normal range of motion.   Cardiovascular:      Rate and Rhythm: Normal rate and regular rhythm.      Heart sounds: No murmur. No friction rub. No gallop.    Pulmonary:      Effort: Pulmonary effort is normal. No respiratory distress.   Abdominal:      Palpations: Abdomen is soft.   Musculoskeletal: Normal range of motion.   Lymphadenopathy:      Cervical: No cervical adenopathy.   Skin:     General: Skin is warm and dry.   Neurological:      Mental Status: She is alert and oriented to person, place, and time.      Motor: No tremor.      Gait: Gait normal.      Comments: No observable changes in neurologic status.  See initial new patient examination for details.       Psychiatric:         Mood and Affect: Mood normal.             Assessment/Plan:     One-time seizure:  6/13/2018: Prolonged prodrome of poor memory when awakened.  Ultimately had a GTC seizure when at the hospital.  Intubated and ultimately discharged on Keppra.  Concern for sepsis but no diagnostic results to support such-- was very stressed and worn down.     No other concern for seizures, no history of febrile seizures.      Continue Keppra 750mg tablets, taking 1500mg BID. Recommend 2 years on AED and seizure-free before challenging to taper off.     Continue daily MVI supplement.     Discussed triggers for a seizure include extreme stress and fatigue.       Discussed signs/symptoms of seizure onset in the future. New Rx for " ativan 1mg tablet prescribed.     Return for follow-up in July 2020.  Will anticipate weaning Keppra at that time by 25% reduction every month.     EDUCATION AND COUNSELING:  -Education was provided to the patient and/or family regarding diagnosis and prognosis. The chronic and unpredictable nature of the condition was discussed. There is increased risk for additional events, which may carry potential for significant injuries and death.    -We reviewed the current antiepileptic regimen. Potential side effects of antiepileptics were discussed at length, including but no limited to: hypersensitivity reactions (rash and others, some of which can be fatal), visual field changes (some of which may be irreversible), glaucoma, diplopia, kidney stones, osteopenia/osteoporosis/bone fractures, hyperthermia/anhydrosis, tremors, ataxia, dizziness, fatigue, increased risk for falls, cardiac arrhythmias/syncope, gastrointestinal (hepatitis, pancreatitis, gastritis, ulcers), gingival hypertrophy, drowsiness, sedation, anxiety/nervousness, increased risk for suicide, increased risk for depression, and psychosis. We reviewed drug-drug interactions and their potential effect on seizure control and medication side effects.    -Patient/family educated on SUDEP (Sudden Death in Epilepsy). Counseling was provided on the importance of strict medication and follow up compliance. The patient understands the risks associated with non-adherence with the medical plan as outlined, including but not limited to an increased risk for breakthrough seizures, which may contribute to injuries, disability, status epilepticus, and even death.    -Counseling was also provided on potential effects of alcohol and other drugs, which may lower seizure threshold and/or affect the metabolism of antiepileptic drugs. I recommend avoidance of alcohol and illegal drugs.  -Recommend proper hydration, regular exercise, proper sleep hygiene (7-8 hrs of overnight  sleep, avoid sleep deprivation).    -I have made the patient aware of mandatory reporting required by the law in the [place] regarding episodes of seizures, loss of consciousness, and/or alteration of awareness. The patient and family are responsible for reporting events to the DMV, instructions were provided. The patient verbalized understanding and states she has been driving. Other seizure precautions were discussed at length, including no diving, no skydiving, no unsupervised swimming, no Jacuzzi or bathing in bathtubs.    Pt agrees with plan.

## 2020-01-14 PROBLEM — J98.11 ATELECTASIS: Status: RESOLVED | Noted: 2018-06-14 | Resolved: 2020-01-14

## 2020-01-14 PROBLEM — E87.8 ELECTROLYTE ABNORMALITY: Status: RESOLVED | Noted: 2018-06-15 | Resolved: 2020-01-14

## 2020-01-14 PROBLEM — G93.40 ENCEPHALOPATHY ACUTE: Status: RESOLVED | Noted: 2018-06-13 | Resolved: 2020-01-14

## 2020-01-14 PROBLEM — J96.01 ACUTE RESPIRATORY FAILURE WITH HYPOXEMIA (HCC): Status: RESOLVED | Noted: 2018-06-14 | Resolved: 2020-01-14

## 2020-08-13 NOTE — TELEPHONE ENCOUNTER
Received request via: Pharmacy    Was the patient seen in the last year in this department? Yes    Does the patient have an active prescription (recently filled or refills available) for medication(s) requested? No     Last seen 12/18/2019   Next appt 08/31/2020

## 2020-08-17 ENCOUNTER — TELEPHONE (OUTPATIENT)
Dept: NEUROLOGY | Facility: MEDICAL CENTER | Age: 55
End: 2020-08-17

## 2020-08-17 RX ORDER — LEVETIRACETAM 750 MG/1
1500 TABLET ORAL 2 TIMES DAILY
Qty: 360 TAB | Refills: 0 | Status: SHIPPED | OUTPATIENT
Start: 2020-08-17

## 2020-08-17 NOTE — TELEPHONE ENCOUNTER
Patient called and stated that her keppra had been denied and the pharmacy is needing refills.     Called patient and notified that rx was sent this morning 08/17 at 6:39AM. She will call and contact pharmacy to confirm, any concerns she will call back.

## 2020-08-31 ENCOUNTER — OFFICE VISIT (OUTPATIENT)
Dept: NEUROLOGY | Facility: MEDICAL CENTER | Age: 55
End: 2020-08-31
Payer: OTHER MISCELLANEOUS

## 2020-08-31 VITALS
TEMPERATURE: 98.3 F | OXYGEN SATURATION: 100 % | RESPIRATION RATE: 16 BRPM | WEIGHT: 187 LBS | HEART RATE: 60 BPM | DIASTOLIC BLOOD PRESSURE: 78 MMHG | HEIGHT: 72 IN | SYSTOLIC BLOOD PRESSURE: 118 MMHG | BODY MASS INDEX: 25.33 KG/M2

## 2020-08-31 DIAGNOSIS — R56.9 SEIZURES (HCC): ICD-10-CM

## 2020-08-31 PROCEDURE — 99213 OFFICE O/P EST LOW 20 MIN: CPT | Performed by: NURSE PRACTITIONER

## 2020-08-31 RX ORDER — LORAZEPAM 1 MG/1
TABLET ORAL
Qty: 20 TAB | Refills: 0 | Status: SHIPPED | OUTPATIENT
Start: 2020-08-31 | End: 2020-10-01

## 2020-08-31 ASSESSMENT — ENCOUNTER SYMPTOMS
DOUBLE VISION: 0
DEPRESSION: 0
COUGH: 0
SEIZURES: 0
CONSTITUTIONAL NEGATIVE: 1
VOMITING: 0
ABDOMINAL PAIN: 0
HEADACHES: 0
NAUSEA: 0
DIARRHEA: 0
SORE THROAT: 0
MUSCULOSKELETAL NEGATIVE: 1
NERVOUS/ANXIOUS: 0

## 2020-08-31 ASSESSMENT — PATIENT HEALTH QUESTIONNAIRE - PHQ9: CLINICAL INTERPRETATION OF PHQ2 SCORE: 0

## 2020-08-31 NOTE — PROGRESS NOTES
Subjective:      Malathi York is a 55 y.o. female who presents with Follow-Up (Seizures (HCC)            HPI   Presents with her daughter today.     [place] during the spring and summer seasons.  She has a step count goal of at least 15,000 per day. Her daughter  on the family ranch in June 2020.      Paying more attention to her body and her level of energy.  The family is helping her and encouraging her to rest when needed.     No concern for unusual events or lapse in memory.     Brain MRI 2018:  Impression        1.  No evidence of acute territorial infarct, intracranial hemorrhage or mass lesion.  2.  Mild microangiopathic ischemic change versus demyelination or gliosis.  3.  Pansinusitis.       Current Outpatient Medications   Medication Sig Dispense Refill   • levetiracetam (KEPPRA) 750 MG tablet Take 2 Tabs by mouth 2 Times a Day. 360 Tab 0   • multivitamin (THERAGRAN) Tab Take 1 Tab by mouth every day.     • Multiple Vitamins-Minerals (ZINC PO) Take 1 Tab by mouth every day.       No current facility-administered medications for this visit.            Review of Systems   Constitutional: Negative.    HENT: Negative for hearing loss, nosebleeds and sore throat.         No recent head injury.   Eyes: Negative for double vision.        No new loss of vision.   Respiratory: Negative for cough.         No recent lung infections.   Cardiovascular: Negative for chest pain.   Gastrointestinal: Negative for abdominal pain, diarrhea, nausea and vomiting.   Genitourinary: Negative.    Musculoskeletal: Negative.    Skin: Negative.    Neurological: Negative for seizures and headaches.   Endo/Heme/Allergies:        No history of endocrine dysfunction.  No new problems.   Psychiatric/Behavioral: Negative for depression. The patient is not nervous/anxious.         No recent mood changes.          Objective:     /78 (BP Location: Left arm)   Pulse 60   Temp 36.8 °C (98.3 °F)  (Temporal)   Resp 16   Ht 1.829 m (6')   Wt 84.8 kg (187 lb)   SpO2 100%   BMI 25.36 kg/m²      Physical Exam  Constitutional:       Appearance: She is well-developed.   HENT:      Head: Normocephalic and atraumatic.      Nose: Nose normal.   Eyes:      Pupils: Pupils are equal, round, and reactive to light.   Neck:      Musculoskeletal: Normal range of motion.   Cardiovascular:      Rate and Rhythm: Normal rate and regular rhythm.   Pulmonary:      Effort: Pulmonary effort is normal.   Musculoskeletal: Normal range of motion.   Skin:     General: Skin is warm.   Neurological:      Mental Status: She is alert and oriented to person, place, and time.      Motor: No abnormal muscle tone.      Gait: Gait normal.      Comments: No observable changes in neurologic status.  See initial new patient examination for details.    Psychiatric:         Mood and Affect: Mood normal.             Assessment/Plan:        One-time seizure:  6/13/2018: Prolonged prodrome of poor memory when awakened.  Ultimately had a GTC seizure when at the hospital.  Intubated and ultimately discharged on Keppra.  Concern for sepsis but no diagnostic results to support such-- was very stressed and exhausted.     No other concern for seizures, no history of febrile seizures.      Begin taper of Keppra from current dose of 1500mg BID using the 750mg tablets.  Will reduce by 750mg monthly with stopping by December 1, 2020.     Continue daily MVI supplement.     Discussed triggers for a seizure include extreme stress and fatigue.       Discussed signs/symptoms of seizure onset in the future. New Rx for ativan 1mg tablet prescribed and appropriate usage thereof.     Return for follow-up in December 2020 for evaluation once weaned off of Keppra completely.     EDUCATION AND COUNSELING:  -Education was provided to the patient and/or family regarding diagnosis and prognosis. The chronic and unpredictable nature of the condition was discussed. There is  increased risk for additional events, which may carry potential for significant injuries and death.    -We reviewed the current antiepileptic regimen. Potential side effects of antiepileptics were discussed at length, including but no limited to: hypersensitivity reactions (rash and others, some of which can be fatal), visual field changes (some of which may be irreversible), glaucoma, diplopia, kidney stones, osteopenia/osteoporosis/bone fractures, hyperthermia/anhydrosis, tremors, ataxia, dizziness, fatigue, increased risk for falls, cardiac arrhythmias/syncope, gastrointestinal (hepatitis, pancreatitis, gastritis, ulcers), gingival hypertrophy, drowsiness, sedation, anxiety/nervousness, increased risk for suicide, increased risk for depression, and psychosis. We reviewed drug-drug interactions and their potential effect on seizure control and medication side effects.    -Patient/family educated on SUDEP (Sudden Death in Epilepsy). Counseling was provided on the importance of strict medication and follow up compliance. The patient understands the risks associated with non-adherence with the medical plan as outlined, including but not limited to an increased risk for breakthrough seizures, which may contribute to injuries, disability, status epilepticus, and even death.    -Counseling was also provided on potential effects of alcohol and other drugs, which may lower seizure threshold and/or affect the metabolism of antiepileptic drugs. I recommend avoidance of alcohol and illegal drugs.  -Recommend proper hydration, regular exercise, proper sleep hygiene (7-8 hrs of overnight sleep, avoid sleep deprivation).    -I have made the patient aware of mandatory reporting required by the law in the State [facility] regarding episodes of seizures, loss of consciousness, and/or alteration of awareness. The patient and family are responsible for reporting events to the DMV, instructions were provided. The patient verbalized  understanding and states she has been driving. Other seizure precautions were discussed at length, including no diving, no skydiving, no unsupervised swimming, no Jacuzzi or bathing in bathtubs.    Pt agrees with plan.

## 2021-01-21 ENCOUNTER — OFFICE VISIT (OUTPATIENT)
Dept: NEUROLOGY | Facility: MEDICAL CENTER | Age: 56
End: 2021-01-21
Attending: NURSE PRACTITIONER
Payer: OTHER MISCELLANEOUS

## 2021-01-21 VITALS
HEART RATE: 75 BPM | WEIGHT: 201.94 LBS | SYSTOLIC BLOOD PRESSURE: 112 MMHG | DIASTOLIC BLOOD PRESSURE: 76 MMHG | RESPIRATION RATE: 16 BRPM | TEMPERATURE: 98.6 F | OXYGEN SATURATION: 98 % | BODY MASS INDEX: 27.35 KG/M2 | HEIGHT: 72 IN

## 2021-01-21 DIAGNOSIS — R56.9 SEIZURES (HCC): ICD-10-CM

## 2021-01-21 DIAGNOSIS — L73.2 HIDRADENITIS: ICD-10-CM

## 2021-01-21 PROCEDURE — 99214 OFFICE O/P EST MOD 30 MIN: CPT | Performed by: NURSE PRACTITIONER

## 2021-01-21 PROCEDURE — 99211 OFF/OP EST MAY X REQ PHY/QHP: CPT | Performed by: NURSE PRACTITIONER

## 2021-01-21 ASSESSMENT — ENCOUNTER SYMPTOMS
CONSTITUTIONAL NEGATIVE: 1
SORE THROAT: 0
NERVOUS/ANXIOUS: 0
NECK PAIN: 1
VOMITING: 0
SEIZURES: 0
COUGH: 0
DEPRESSION: 0
HEADACHES: 0
ABDOMINAL PAIN: 0
DIARRHEA: 0
NAUSEA: 0
DOUBLE VISION: 0

## 2021-01-21 ASSESSMENT — PATIENT HEALTH QUESTIONNAIRE - PHQ9: CLINICAL INTERPRETATION OF PHQ2 SCORE: 0

## 2021-01-21 NOTE — PROGRESS NOTES
Subjective:      Malathi York is a 55 y.o. female who presents with Follow-Up (Seizures )        Here with adult daughter today.    HPI      Last dose of Keppra was 12/1/2020.  With each taper of the medication she felt improved.  She has noticed that her energy has returned.  She has no concerns for seizures.  Denies any gaps in memory or cognition.  Much more aware of the need to rest and her family encourages her to do so routinely.    Elevated liver enzymes, triglycerides elevated.  Instructed to start on [place]-3 per PCP.    Stopped taking Ibuprofen PM and stopped alcohol usage.  She is questioning if the Leviteracetam would have increased her liver enzymes in the past.    Brain MRI 2018:  Impression        1.  No evidence of acute territorial infarct, intracranial hemorrhage or mass lesion.  2.  Mild microangiopathic ischemic change versus demyelination or gliosis.  3.  Pansinusitis.      6/2018 EEG INTERPRETATION:   This is a normal extended video electroencephalogram recording in a sedated patient with intermittent arousals.  No events or seizures were captured during the study. Clinical correlation is recommended.     Note: A normal electroencephalogram does not rule out epilepsy.          Current Outpatient Medications   Medication Sig Dispense Refill   • Calcium Carb-Cholecalciferol (CALCIUM CARBONATE-VITAMIN D3 PO) Take 500 mg by mouth every day.     • multivitamin (THERAGRAN) Tab Take 1 Tab by mouth every day.     • Multiple Vitamins-Minerals (ZINC PO) Take 1 Tab by mouth every day.     • levetiracetam (KEPPRA) 750 MG tablet Take 2 Tabs by mouth 2 Times a Day. (Patient not taking: Reported on 1/21/2021) 360 Tab 0     No current facility-administered medications for this visit.          Review of Systems   Constitutional: Negative.    HENT: Negative for hearing loss, nosebleeds and sore throat.         No recent head injury.   Eyes: Negative for double vision.        No new loss of vision.  "  Respiratory: Negative for cough.         No recent lung infections.   Cardiovascular: Negative for chest pain.   Gastrointestinal: Negative for abdominal pain, diarrhea, nausea and vomiting.   Genitourinary: Negative.    Musculoskeletal: Positive for neck pain (occasional).   Skin: Negative.    Neurological: Negative for seizures and headaches.   Endo/Heme/Allergies:        No history of endocrine dysfunction.  No new problems.   Psychiatric/Behavioral: Negative for depression. The patient is not nervous/anxious.         No recent mood changes.          Objective:     /76 (BP Location: Left arm, Patient Position: Sitting, BP Cuff Size: Adult)   Pulse 75   Temp 37 °C (98.6 °F) (Temporal)   Resp 16   Ht 1.829 m (6' 0.01\")   Wt 91.6 kg (201 lb 15.1 oz)   SpO2 98%   BMI 27.38 kg/m²      Physical Exam  Constitutional:       Appearance: She is well-developed.   HENT:      Head: Normocephalic and atraumatic.      Nose: Nose normal.   Eyes:      Pupils: Pupils are equal, round, and reactive to light.   Neck:      Musculoskeletal: Normal range of motion.   Cardiovascular:      Rate and Rhythm: Normal rate and regular rhythm.   Pulmonary:      Effort: Pulmonary effort is normal.   Musculoskeletal: Normal range of motion.   Skin:     General: Skin is warm.   Neurological:      Mental Status: She is alert and oriented to person, place, and time.      Motor: No abnormal muscle tone.      Gait: Gait normal.      Comments: No observable changes in neurologic status.  See initial new patient examination for details.    Psychiatric:         Mood and Affect: Mood normal.               Assessment/Plan:         One-time seizure:  6/13/2018: Prolonged prodrome of poor memory when awakened.  Ultimately had a GTC seizure when at the hospital.  Intubated and ultimately discharged on Keppra.  Concern for sepsis but no diagnostic results to support such-- was very stressed and exhausted.     No other concern for seizures, no " history of febrile seizures.      Successfully tapered off of Leviteracetam with the last dose being December 1, 2020.     Continue daily MVI supplement.     Discussed triggers for a seizure include extreme stress and fatigue.       Discussed signs/symptoms of seizure onset in the future. Family has Rx for ativan 1mg tablet prescribed and appropriate usage thereof.     Return for follow-up as needed.  Will call with any concern for seizure and will obtain EEG.     EDUCATION AND COUNSELING:  -Education was provided to the patient and/or family regarding diagnosis and prognosis. The chronic and unpredictable nature of the condition was discussed. There is increased risk for additional events, which may carry potential for significant injuries and death.    -We reviewed the current antiepileptic regimen. Potential side effects of antiepileptics were discussed at length, including but no limited to: hypersensitivity reactions (rash and others, some of which can be fatal), visual field changes (some of which may be irreversible), glaucoma, diplopia, kidney stones, osteopenia/osteoporosis/bone fractures, hyperthermia/anhydrosis, tremors, ataxia, dizziness, fatigue, increased risk for falls, cardiac arrhythmias/syncope, gastrointestinal (hepatitis, pancreatitis, gastritis, ulcers), gingival hypertrophy, drowsiness, sedation, anxiety/nervousness, increased risk for suicide, increased risk for depression, and psychosis. We reviewed drug-drug interactions and their potential effect on seizure control and medication side effects.    -Patient/family educated on SUDEP (Sudden Death in Epilepsy). Counseling was provided on the importance of strict medication and follow up compliance. The patient understands the risks associated with non-adherence with the medical plan as outlined, including but not limited to an increased risk for breakthrough seizures, which may contribute to injuries, disability, status epilepticus, and even  death.    -Counseling was also provided on potential effects of alcohol and other drugs, which may lower seizure threshold and/or affect the metabolism of antiepileptic drugs. I recommend avoidance of alcohol and illegal drugs.  -Recommend proper hydration, regular exercise, proper sleep hygiene (7-8 hrs of overnight sleep, avoid sleep deprivation).    -I have made the patient aware of mandatory reporting required by the law in the State [place] regarding episodes of seizures, loss of consciousness, and/or alteration of awareness. The patient and family are responsible for reporting events to the DMV, instructions were provided. The patient verbalized understanding and states she has been driving. Other seizure precautions were discussed at length, including no diving, no skydiving, no unsupervised swimming, no Jacuzzi or bathing in bathtubs.    Pt agrees with plan.

## 2021-06-18 NOTE — DISCHARGE SUMMARY
Discharge Summary    CHIEF COMPLAINT ON ADMISSION  Chief Complaint   Patient presents with   • ALOC       Reason for Admission  Possible Stroke     Admission Date  6/13/2018    CODE STATUS  Full Code    HPI & HOSPITAL COURSE  This is a 52 y.o. female here with initial assessment of altered mental status. The patient exhibited confusion and the confusion upon arrival to the emergency room continued to get worse. The patient apparently previously had a seizure. The patient has a family history of seizure including her father and her sister both had had grand mal seizures. The patient apparently recently put on a wedding for her son with a [place] over 400 people [place]. Afterwards the patient became extremely dehydrated and since she does have pyoderma gangrenosa the patient was visiting her dermatologist in [place] when the patient was initially noticed to have altered mental status. Neurology at that point was consulted. They had recommended an EEG. In the patient was started on Keppra IV loading. The patient was exhibiting severe psychosis and agitation and thus Geodon was ordered for the patient as well the patient ended up having to go initially to the ICU. The patient was not able to protect her airway and had to be mechanically ventilated after intubation.the patient's condition then slowly improved and the patient was able to be taken off the ventilator. After being off the ventilator patient was reevaluated with speech and speech at this point has found that the patient continues to be extremely dysphagia. A core track at this point will be left in place. The patient did have an FEES exam, on this we can see that the vocal cords at this point are mechanically traumatized. And then she also had the aryepiglottic fold with swelling. At this point will take several weeks for the patient's speech did improve. Physical therapy did reevaluate the patient and at this point finds that the patient is stable and  able to be discharge. Occupational therapy has also evaluated the patient and finds that the patient is stable and can be discharged. At this point we have spoken with the primary physician up in [place] except the patient back and then they will set up home health for the patient as well since the patient did have aspiration pneumonia with her seizure the patient was initially placed on 7 days of antibiotics here in the hospital with IV Rocephin and initially was also given 3 days of acyclovir and 2 days of vancomycin. The patient at this point we will continue with Levaquin as an outpatient through the PEG tube for 7 days. The patient will continue as an outpatient with Keppra indefinitely at this point initially through the G-tube in the liquid form thank you this can be changed over to an oral form once the patient is again eating. Today the patient will be able to discharge with family home and then follow up tomorrow with her primary care physician.       Therefore, she is discharged in fair and stable condition to home with close outpatient follow-up.    The patient met 2-midnight criteria for an inpatient stay at the time of discharge.    Discharge Date  6/21/2018    FOLLOW UP ITEMS POST DISCHARGE  Follow with the primary care physician tomorrow morning    DISCHARGE DIAGNOSES  Principal Problem:    Encephalopathy acute POA: Unknown  Active Problems:    Seizures (HCC) POA: Unknown    Acute respiratory failure with hypoxemia (HCC) POA: Unknown    Atelectasis POA: Unknown    Hydradenitis POA: Unknown    Electrolyte abnormality POA: Unknown    Pharyngeal dysphagia POA: Unknown  Resolved Problems:    Leukocytosis POA: Unknown      FOLLOW UP  No future appointments.  No follow-up provider specified.    MEDICATIONS ON DISCHARGE     Medication List      START taking these medications      Instructions   levETIRAcetam 100 MG/ML Soln  Commonly known as:  KEPPRA   15 mL by Nasogastric route 2 times a day for 30  "days.  Dose:  1500 mg     levofloxacin 25 MG/ML solution  Commonly known as:  LEVAQUIN   Take 20 mL by mouth every day for 7 days.  Dose:  500 mg     oxyCODONE immediate-release 5 MG Tabs  Commonly known as:  ROXICODONE   1 Tab by Per NG Tube route every 6 hours as needed for up to 7 days.  Dose:  5 mg        CONTINUE taking these medications      Instructions   multivitamin Tabs   Take 1 Tab by mouth every day.  Dose:  1 Tab     ZINC PO   Take 1 Tab by mouth every day.  Dose:  1 Tab            Allergies  Allergies   Allergen Reactions   • Penicillins Unspecified     \"childhood\"  6/14/18: Patient tolerates cephalosporins       DIET  Orders Placed This Encounter   Procedures   • Diet NPO     Standing Status:   Standing     Number of Occurrences:   1     Order Specific Question:   Type:     Answer:   Now [1]     Order Specific Question:   Restrict to:     Answer:   Strict [1]       ACTIVITY  As tolerated.  Weight bearing as tolerated    CONSULTATIONS  Pulmonary, neurology, infectious disease    PROCEDURES  AG 6/14/2018  Endotracheal intubation 6/14/2018  Diagnostic and therapeutic flexible fiberoptic bronchoscopy with bronchoalveolar lavage 6/14/2018  Central line placement 6/14/2018  Fluoroscopic guided lumbar puncture 6/15/2018.  Video EEG 6/16/2018    LABORATORY  Lab Results   Component Value Date    SODIUM 140 06/21/2018    POTASSIUM 3.7 06/21/2018    CHLORIDE 106 06/21/2018    CO2 24 06/21/2018    GLUCOSE 86 06/21/2018    BUN 11 06/21/2018    CREATININE 0.73 06/21/2018        Lab Results   Component Value Date    WBC 7.7 06/21/2018    HEMOGLOBIN 11.9 (L) 06/21/2018    HEMATOCRIT 34.4 (L) 06/21/2018    PLATELETCT 227 06/21/2018        Total time of the discharge process exceeds 60 minutes.  " Xolair Pregnancy And Lactation Text: This medication is Pregnancy Category B and is considered safe during pregnancy. This medication is excreted in breast milk.